# Patient Record
Sex: FEMALE | Race: WHITE
[De-identification: names, ages, dates, MRNs, and addresses within clinical notes are randomized per-mention and may not be internally consistent; named-entity substitution may affect disease eponyms.]

---

## 2017-02-21 NOTE — CT
EXAMINATION TYPE: CT brain wo con

 

DATE OF EXAM: 2/21/2017 12:25 PM

 

COMPARISON: NONE

 

HISTORY: 76-year-old female with pain and complains of episode of slurred speech earlier today.

 

TECHNIQUE:  

Examination was done in axial plane without intravenous contrast.  Coronal and sagittal reconstructio
ns performed.

 

CT DLP: 578.9 mGycm

Automated exposure control for dose reduction was used.

 

FINDINGS:

There is no evidence of  acute intracranial hemorrhage, acute ischemic changes, mass, mass-effect, or
 extra-axial fluid collection.  There is no effacement of cerebral sulci or basal subarachnoid cister
ns.  There is no hydrocephalus.  There is no midline shift.  Gray-white matter distinction is preserv
ed.

 

There is mild to moderate generalized supratentorial volume loss with moderate confluent white matter
 hypodensities in both cerebral hemispheres.

 

Paranasal sinuses and mastoid air cells are well pneumatized. Orbits and globes are intact.

 

 

IMPRESSION:

No acute intracranial abnormality seen. Mild to moderate atrophy and moderate confluent changes of ch
ronic small vessel ischemic disease.

## 2017-02-21 NOTE — CT
EXAMINATION TYPE: CT abdomen wo con

 

DATE OF EXAM: 2/21/2017 5:42 PM

 

COMPARISON: 10/11/2013

 

HISTORY: Patient complains of epigastric pain.

 

CT DLP: 666 mGycm

Automated exposure control for dose reduction was used.

 

TECHNIQUE:  Helical acquisition of images was performed from the lung bases through the top of iliac 
crest to include entire abdomen.

 

CONTRAST: 

Performed with Oral Contrast and without IV contrast.

 

FINDINGS: 

 

The heart is enlarged. There is no pleural effusion. There is no pericardial effusion. Abdominal aort
a is atheromatous. There are clips from cholecystectomy. Liver shows no focal defect. Spleen appears 
normal. There is no evidence of a pancreatic mass. There is no adrenal mass. Kidneys have normal size
 and contour. There is no hydronephrosis. There is no retroperitoneal adenopathy. I see no intestinal
 wall thickening. There is no sign of a bowel obstruction. There is no ascites. There is spurring in 
the lumbar spine.

IMPRESSION: 

CARDIOMEGALY. ATHEROSCLEROTIC VASCULAR DISEASE. SPONDYLOTIC CHANGES IN THE LUMBAR SPINE. NO SIGN OF A
CUTE ABDOMEN AND PELVIS. MINIMAL SCARRING OR SUBSEGMENTAL ATELECTASIS AT THE LUNG BASES. THERE IS PRO
BABLY A SMALL HIATAL HERNIA.

 

There is no adverse change compared to old CT scan.

## 2017-02-21 NOTE — ED
General Adult HPI





- General


Stated complaint: Upper Abd pain


Time Seen by Provider: 02/21/17 11:52


Source: patient, RN notes reviewed


Mode of arrival: EMS


Limitations: no limitations





- History of Present Illness


Initial comments: 





76-year-old female presents to the emergency department with a chief complaint 

of epigastric discomfort.  Patient developed this epigastric discomfort this 

morning.  Patient states it hurts she touches the upper part of her belly.  

Patient states that she took her and asked that medicine because she was on the 

past that this was her acid reflux but it did not help.  Patient states that 

she then developed some left arm heaviness.  Patient states during this time 

she did have an episode of slurred speech but that has since resolved.  Patient 

states that she hasn't had a fever or chills.  Patient states when she is 

having epigastric pain she has some nausea there has not been any sweating.  

Patient states that she was concerned due to the pain that the fact that her 

typical medications and help in this new left arm heaviness so she thought that 

she should be evaluated.  Patient denies any back pain with this.  Patient 

states she hasn't had a fever chills she denies any diarrhea.  Patient denies 

any actual vomiting.Patient denies any recent fever, chills, shortness of breath

, chest pain, back pain,  vomiting, numbness or tingling, dysuria or hematuria, 

constipation or diarrhea, headaches or visual changes, or any other current 

symptoms.





- Related Data


 Home Medications











 Medication  Instructions  Recorded  Confirmed


 


Enalapril [Vasotec] 20 mg PO BID 05/30/14 02/21/17


 


Simvastatin [Zocor] 40 mg PO HS 05/30/14 02/21/17


 


glyBURIDE/METFORMIN HCL 2 tab PO BID 05/30/14 02/21/17





[Glucovance 5-500 mg Tablet]   


 


traMADol HCl [Ultram] 50 mg PO TID PRN 05/30/14 02/21/17


 


Cholecalciferol [Vitamin D3] 4,000 unit PO SA 02/21/17 02/21/17


 


Gabapentin [Neurontin] 100 mg PO HS 02/21/17 02/21/17


 


Insulin Glargine [Lantus] 42 unit SQ HS 02/21/17 02/21/17


 


Isosorbide Mononitrate ER [Imdur] 30 mg PO DAILY 02/21/17 02/21/17


 


LORazepam [Ativan] 0.5 mg PO DAILY PRN 02/21/17 02/21/17


 


Nitrofurantoin Macrocrystal 50 mg PO HS 02/21/17 02/21/17





[Macrodantin]   


 


Pantoprazole [Protonix] 40 mg PO DAILY 02/21/17 02/21/17


 


amLODIPine [Norvasc] 5 mg PO DAILY 02/21/17 02/21/17











 Allergies











Allergy/AdvReac Type Severity Reaction Status Date / Time


 


latex Allergy  Rash/Hives Verified 02/21/17 12:08














Review of Systems


ROS Statement: 


Those systems with pertinent positive or pertinent negative responses have been 

documented in the HPI.





ROS Other: All systems not noted in ROS Statement are negative.





Past Medical History


Past Medical History: Diabetes Mellitus, Hypertension


Additional Past Medical History / Comment(s): high cholesterol


History of Any Multi-Drug Resistant Organisms: None Reported


Past Surgical History: Cholecystectomy, Tonsillectomy, Tubal Ligation


Past Psychological History: Anxiety, Depression


Smoking Status: Former smoker


Past Alcohol Use History: None Reported


Past Drug Use History: None Reported





General Exam





- General Exam Comments


Initial Comments: 





General:  The patient is awake and alert, in no distress, and does not appear 

acutely ill. 


Eye:  Pupils are equal, round and reactive to light, extra-ocular movements are 

intact; there is normal conjunctiva bilaterally.  No signs of icterus.  


Ears, nose, mouth and throat:  There are moist mucous membranes and no oral 

lesions. 


Neck:  The neck is supple, there is no tenderness.


Cardiovascular:  There is a regular rate and rhythm. No murmur, rub or gallop 

is appreciated.


Respiratory:  Lungs are clear to auscultation, respirations are non-labored, 

breath sounds are equal.  No wheezes, stridor, rales, or rhonchi.


Gastrointestinal:  Soft, non-distended,  epigastric tender abdomen without 

masses or organomegaly noted. There is no rebound or guarding present.  No CVA 

tenderness. Bowel sounds are unremarkable.


Back:  There is no tenderness to palpation in the midline. There is no obvious 

deformity. No rashes noted. 


Musculoskeletal:  Normal ROM, no tenderness, There is no pedal edema. There is 

no calf tenderness or swelling. Sensation intact. Pulses equal bilaterally 2+.  


Neurological:  CN II-XII intact, There are no obvious motor or sensory 

deficits. Coordination appears grossly intact. Speech is normal.


Skin:  Skin is warm and dry and no rashes or lesions are noted. 


Psychiatric:  Cooperative, appropriate mood & affect, normal judgment.  





Limitations: no limitations





Course


 Vital Signs











  02/21/17 02/21/17 02/21/17





  11:52 12:05 13:02


 


Temperature 97.3 F L 98.8 F 99.3 F


 


Pulse Rate 67 68 76


 


Respiratory 18 24 20





Rate   


 


Blood Pressure 157/91 176/77 168/81


 


O2 Sat by Pulse 99 98 98





Oximetry   














EKG Findings





- EKG Comments:


EKG Findings:: Normal sinus rhythm, T-wave inversion in V3 V4 V5 and V6, 

ventricular rate 64, NJ interval 154





Medical Decision Making





- Medical Decision Making





76-year-old female presents to the emergency department with a chief complaint 

of epigastric abdominal pain.  As the patient does appear to have acute 

pancreatitis along with a mild acute kidney injury.  Similar patient for IV 

hydration.  At this time this is discussed with patient who is in agreement the 

plan.  All questions have been answered.  They will admit the patient.





- Lab Data


Result diagrams: 


 02/21/17 11:52





 02/21/17 11:52


 Lab Results











  02/21/17 02/21/17 02/21/17 Range/Units





  11:52 11:52 11:52 


 


WBC    9.9  (3.8-10.6)  k/uL


 


RBC    5.73 H  (3.80-5.40)  m/uL


 


Hgb    11.9  (11.4-16.0)  gm/dL


 


Hct    38.5  (34.0-46.0)  %


 


MCV    67.2 L  (80.0-100.0)  fL


 


MCH    20.8 L  (25.0-35.0)  pg


 


MCHC    30.9 L  (31.0-37.0)  g/dL


 


RDW    14.8  (11.5-15.5)  %


 


Plt Count    223  (150-450)  k/uL


 


Neutrophils %    60  %


 


Lymphocytes %    30  %


 


Monocytes %    4  %


 


Eosinophils %    3  %


 


Basophils %    1  %


 


Neutrophils #    5.9  (1.3-7.7)  k/uL


 


Lymphocytes #    2.9  (1.0-4.8)  k/uL


 


Monocytes #    0.4  (0-1.0)  k/uL


 


Eosinophils #    0.3  (0-0.7)  k/uL


 


Basophils #    0.1  (0-0.2)  k/uL


 


Hypochromasia    Slight  


 


Microcytosis    Marked  


 


PT     (9.0-12.0)  sec


 


INR     (<1.1)  


 


APTT     (22.0-30.0)  sec


 


Sodium  136 L    (137-145)  mmol/L


 


Potassium  5.1    (3.5-5.1)  mmol/L


 


Chloride  101    ()  mmol/L


 


Carbon Dioxide  19 L    (22-30)  mmol/L


 


Anion Gap  16    mmol/L


 


BUN  20 H    (7-17)  mg/dL


 


Creatinine  1.24 H    (0.52-1.04)  mg/dL


 


Est GFR (MDRD) Af Amer  51    (>60 ml/min/1.73 sqM)  


 


Est GFR (MDRD) Non-Af  42    (>60 ml/min/1.73 sqM)  


 


Glucose  228 H    (74-99)  mg/dL


 


Calcium  10.0    (8.4-10.2)  mg/dL


 


Magnesium  1.3 L    (1.6-2.3)  mg/dL


 


Total Bilirubin  0.7    (0.2-1.3)  mg/dL


 


AST  32    (14-36)  U/L


 


ALT  31    (9-52)  U/L


 


Alkaline Phosphatase  102    ()  U/L


 


Total Creatine Kinase   73   ()  U/L


 


CK-MB (CK-2)   1.3   (0.0-2.4)  ng/mL


 


CK-MB (CK-2) Rel Index   1.8   


 


Troponin I   0.014   (0.000-0.034)  ng/mL


 


Total Protein  7.8    (6.3-8.2)  g/dL


 


Albumin  4.2    (3.5-5.0)  g/dL


 


Amylase  222 H    ()  U/L


 


Lipase  2767 H    ()  U/L


 


Urine Color     


 


Urine Appearance     (Clear)  


 


Urine pH     (5.0-8.0)  


 


Ur Specific Gravity     (1.001-1.035)  


 


Urine Protein     (Negative)  


 


Urine Glucose (UA)     (Negative)  


 


Urine Ketones     (Negative)  


 


Urine Blood     (Negative)  


 


Urine Nitrate     (Negative)  


 


Urine Bilirubin     (Negative)  


 


Urine Urobilinogen     (<2.0)  mg/dL


 


Ur Leukocyte Esterase     (Negative)  














  02/21/17 02/21/17 Range/Units





  11:52 12:40 


 


WBC    (3.8-10.6)  k/uL


 


RBC    (3.80-5.40)  m/uL


 


Hgb    (11.4-16.0)  gm/dL


 


Hct    (34.0-46.0)  %


 


MCV    (80.0-100.0)  fL


 


MCH    (25.0-35.0)  pg


 


MCHC    (31.0-37.0)  g/dL


 


RDW    (11.5-15.5)  %


 


Plt Count    (150-450)  k/uL


 


Neutrophils %    %


 


Lymphocytes %    %


 


Monocytes %    %


 


Eosinophils %    %


 


Basophils %    %


 


Neutrophils #    (1.3-7.7)  k/uL


 


Lymphocytes #    (1.0-4.8)  k/uL


 


Monocytes #    (0-1.0)  k/uL


 


Eosinophils #    (0-0.7)  k/uL


 


Basophils #    (0-0.2)  k/uL


 


Hypochromasia    


 


Microcytosis    


 


PT  11.8   (9.0-12.0)  sec


 


INR  1.2   (<1.1)  


 


APTT  21.9 L   (22.0-30.0)  sec


 


Sodium    (137-145)  mmol/L


 


Potassium    (3.5-5.1)  mmol/L


 


Chloride    ()  mmol/L


 


Carbon Dioxide    (22-30)  mmol/L


 


Anion Gap    mmol/L


 


BUN    (7-17)  mg/dL


 


Creatinine    (0.52-1.04)  mg/dL


 


Est GFR (MDRD) Af Amer    (>60 ml/min/1.73 sqM)  


 


Est GFR (MDRD) Non-Af    (>60 ml/min/1.73 sqM)  


 


Glucose    (74-99)  mg/dL


 


Calcium    (8.4-10.2)  mg/dL


 


Magnesium    (1.6-2.3)  mg/dL


 


Total Bilirubin    (0.2-1.3)  mg/dL


 


AST    (14-36)  U/L


 


ALT    (9-52)  U/L


 


Alkaline Phosphatase    ()  U/L


 


Total Creatine Kinase    ()  U/L


 


CK-MB (CK-2)    (0.0-2.4)  ng/mL


 


CK-MB (CK-2) Rel Index    


 


Troponin I    (0.000-0.034)  ng/mL


 


Total Protein    (6.3-8.2)  g/dL


 


Albumin    (3.5-5.0)  g/dL


 


Amylase    ()  U/L


 


Lipase    ()  U/L


 


Urine Color   Yellow  


 


Urine Appearance   Clear  (Clear)  


 


Urine pH   6.0  (5.0-8.0)  


 


Ur Specific Gravity   1.012  (1.001-1.035)  


 


Urine Protein   Trace H  (Negative)  


 


Urine Glucose (UA)   Trace H  (Negative)  


 


Urine Ketones   Negative  (Negative)  


 


Urine Blood   Negative  (Negative)  


 


Urine Nitrate   Negative  (Negative)  


 


Urine Bilirubin   Negative  (Negative)  


 


Urine Urobilinogen   <2.0  (<2.0)  mg/dL


 


Ur Leukocyte Esterase   Negative  (Negative)  














- Radiology Data


Radiology results: report reviewed, image reviewed





Disposition


Clinical Impression: 


 Hyponatremia, KATHERINE (acute kidney injury), Acute pancreatitis





Disposition: ADMITTED AS IP TO THIS [facility]


Condition: Stable


Referrals: 


Mckay Martinez MD [Primary Care Provider] - 1-2 days


Time of Disposition: 13:13


Decision Date: 02/21/17


Decision Time: 13:13

## 2017-02-21 NOTE — XR
EXAMINATION TYPE: XR chest 2V

 

DATE OF EXAM: 2/21/2017 12:32 PM

 

COMPARISON: 2/14/2013

 

HISTORY: 76-year-old female with chest pain and chest pressure

 

TECHNIQUE:  Frontal and lateral views

 

FINDINGS:  

Heart is upper limits of normal in size with mild elongation of the thoracic aorta. Mild interstitial
 prominence and some stable scarring at the left base. No pleural effusion.

 

 

IMPRESSION:  

 

Borderline heart size and mild interstitial prominence; correlate to exclude mild CHF. Otherwise, no 
acute process seen.

## 2017-02-21 NOTE — HP
DATE OF ADMISSION:  



CHIEF COMPLAINT: Abdominal pain.



HISTORY OF PRESENT ILLNESS: This 76-year-old woman with a past history 

of diabetes, GERD, hypertension, hyperlipidemia, history of 

vasospastic angina, history of sinus problems, history of 

cholecystectomy, tonsillectomy, anxiety, depression, being followed by 

Dr. Mckay Martinez in the outpatient setting, was complaining of 

epigastric pain since this morning. The abdomen is painful to touch, 

according to her. The patient had some nausea, too. Patient thought 

that it was acid reflux, and she came to [facility] and was 

admitted for further evaluation and treatment. Some left arm heaviness 

also was noted. There is no history of any fever, rigor, or chills.  

No history of any headache, loss of consciousness, seizures. The 

patient came to [facility] and was found to have amylase of 

222 and lipase 276, indicative of acute pancreatitis. The patient had 

a cholecystectomy previously.  



PAST MEDICAL HISTORY: 

1. History of diabetes mellitus.

2. History of GERD. 

3. Hypertension.

4. Hyperlipidemia. 

5. History of cholecystectomy. 

6. History of tonsillectomy. 

7. Atrial fibrillation. 

8. Anxiety. 

9. Depression. 



Home medications are: 

1. Macrodantin 50 mg p.o. at bedtime. 

2. Vitamin D3 4000 p.o. Saturday. 

3. Protonix 40 mg daily. 

4. Imdur 30 mg p.o. daily. 

5. Ultram 50 mg t.i.d. p.r.n. 

6. Norvasc 5 mg p.o. daily. 

7. Glucovance 5/500 two tablets p.o. b.i.d. 

8. Vasotec 20 mg p.o. b.i.d. 

9. Zocor 40 mg at bedtime. 

10. Neurontin 300 mg p.o. at bedtime. 

11. Ativan 0.5 mg daily p.r.n. 

12. Lantus 42 units subcutaneously at bedtime. 



ALLERGIES: LATEX. 



FAMILY HISTORY: History of cancer in the family. 



SOCIAL HISTORY: Previous history of smoking. No current smoking or 

alcohol intake.  



REVIEW OF SYSTEMS: 

ENT: No diminished hearing. No diminished vision. 

CARDIOVASCULAR: No angina, palpitations. 

RESPIRATORY: No cough, hemoptysis. 

GI: As mentioned earlier. 

: No dysuria. 

NERVOUS SYSTEM: No numbness or weakness. 

ALLERGY/IMMUNOLOGY: No asthma, hayfever. 

MUSCULOSKELETAL: As mentioned earlier. 

HEMATOLOGY/ONCOLOGY: No history of anemia. 

ENDOCRINE: Diabetes mellitus. 

CONSTITUTIONAL:  As mentioned earlier. 

DERMATOLOGY: Negative. 

RHEUMATOLOGY: Negative. 

PSYCHIATRY: As mentioned earlier. 



PHYSICAL EXAMINATION: Patient is alert and oriented x3. Pulse 68, 

blood pressure 150/84, respiration 20, temperature 97.8, pulse ox 98% 

on 3 L.  

HEENT: Conjunctivae normal. Oral mucosa moist. 

NECK: No jugular venous distention. No carotid bruit. No lymph node 

enlargement. 

CARDIOVASCULAR SYSTEM: S1, S2 normal. No S3. No S4. 

RESPIRATORY: Breath sounds diminished at the bases. No rhonchi. No 

crackles.  

ABDOMEN: Soft, obese. Mild diffuse tenderness in the epigastrium. No 

guarding. No rigidity. No mass palpable. No hepatosplenomegaly. No 

ascites. Bowel sounds present.  

LEGS: No edema. No swelling. 

NERVOUS SYSTEM: Higher functions as mentioned earlier. Moves all 4 

limbs. No focal motor or sensory deficit. Cranial nerves 2-12 grossly 

intact  

LYMPHATICS:  No lymph node palpable in neck, axillae or groin.   

SKIN: No ulcer, rash, bleeding. 



LABS: WBC 9.3, hemoglobin 11.9. INR 1.2. Sodium 136. Creatinine is 

1.24. Glucose is 228. Magnesium is 1.3. Amylase and lipase noted.  



ASSESSMENT: 

1. Abdominal pain with acute pancreatitis. 

2. History of cholecystectomy. 

3. Increased creatinine with possible mild acute renal failure, 

prerenal, secondary to dehydration.  

4. Diabetes mellitus, type 2. 

5. Hypomagnesemia. 

6. Hyponatremia. 

7. Obesity; body mass index 34.1. 

8. Microcytosis. 

9. Gastroesophageal reflux disease. 

10. Hypertension. 

11. Hyperlipidemia. 

12. History of vasospastic angina. 

13. History of degenerative joint disease. 

14. History of urinary tract infections. 

15. History of anxiety, depression not otherwise specified. 

16. Remote history of nicotine dependence. 

17. FULL CODE.



RECOMMENDATIONS AND DISCUSSION: In this 76-year-old woman who 

presented with multiple complex medical issues, we will monitor the 

patient closely, continue the current medications, continue with 

symptomatic treatment. I would recommend IV fluids. Will hold the oral 

antihyper medications at this time. Otherwise, continue to 

monitor. Accu-Cheks before meals and at bedtime and scale. Resume the 

home medications. Medication reconciliation done. Pain medications. 

Guarded prognosis because of multiple complex medical issues. Further 

recommendations to follow. A copy of this dictation is being forwarded 

to Dr. Martinez, who is the primary physician.  



ELPIDIO

## 2017-02-22 NOTE — PN
DATE OF SERVICE: 02/22/2017



This 76-year-old woman who was admitted with abdominal pain with acute 

pancreatitis also had a history of cholecystectomy previously. No 

chest pain or palpitations. No fever.  amylase and lipase. 

Still has some abdominal pain. The CT scan of the abdomen showed 

atherosclerotic changes but no significant abnormalities of the 

pancreas. Hiatal hernia was also noted. On exam, alert and oriented 

x3. Pulse 70, blood pressure 135/77, respiration 20, temperature 98.7, 

pulse ox 96% on room air.  

HEENT: Conjunctivae normal. 

NECK: No jugular venous distention. 

CARDIOVASCULAR SYSTEM: S1, S2 muffled. 

RESPIRATORY SYSTEM: Breath sounds diminished at the bases. A few 

scattered rhonchi. No crackles. 

ABDOMEN: Soft. Mild diffuse discomfort. No guarding. No rigidity. No 

mass palpable.  

LEGS: No edema. No swelling. 

NERVOUS SYSTEM: Higher functions as mentioned earlier. Moves all 4 

limbs. No focal motor or sensory deficit.  

LYMPHATICS:  No lymph node palpable in neck, axillae or groin.   

SKIN: No ulcer, rash, bleeding. 



LABS AT THIS TIME: Hemoglobin is 11.2. Otherwise, glucose is 182. 

Creatinine 1.14. Amylase 139. Lipase is 1026.  



ASSESSMENT: 

1. Abdominal pain with acute pancreatitis. 

2. History of cholecystectomy. 

3. Increased creatinine with possible mild acute renal failure, 

prerenal secondary to dehydration. 

4. Diabetes mellitus, type 2. 

5. Hypomagnesemia. 

6. Hyponatremia. 

7. Obesity with a body mass index of 34.1. 

8. Microcytosis. 

9. History of gastroesophageal reflux disease. 

10. Hypertension, essential. 

11. Hyperlipidemia. 

12. History of vasospastic angina. 

13. History of degenerative joint disease. 

14. History of urinary tract infection. 

15. History of anxiety and depression not otherwise specified. 

16. Remote history of nicotine dependence. 

17. FULL CODE.



RECOMMENDATIONS AND DISCUSSION: In this 76-year-old woman who 

presented with multiple complex medical issues, we will monitor the 

patient closely, continue the current medication, continue symptomatic 

treatment.  I will initiate the diet. Closely monitor. Will also 

obtain a gastroenterology consultation. Otherwise, guarded prognosis 

because of multiple complex medical issues. Further recommendations to 

follow. See orders for further details.  



MTDD

## 2017-02-23 NOTE — P.CONS
History of Present Illness





- Reason for Consult


Consult date: 17


Pancreatitis


Requesting physician: Oralia Castro





- History of Present Illness


76-year-old female patient Dr. Martinez with a past medical history of acalculus 

cholecystectomy, chronic kidney disease, diabetes mellitus, GERD, hypertension, 

hyperlipidemia, anxiety, depression.  Patient presents with severe 

midepigastric abdominal pain elevated BUN/creatinine.  Abdominal pain started 

Monday with elevated admission pancreatic enzymes consistent with acute 

pancreatitis.  Lipase 1026.  Amylase 139.  LFTs within normal limits.  

Triglycerides 199.  Calcium 9.2.





No history of pancreatitis.  No history of fever, chills, hematemesis, 

hematochezia, melena, peptic ulcer disease, alcoholism, or known pancreatic 

disorders.  She was in [place] 6 months ago and was evaluated for midepigastric 

pain at that time she was taking NSAIDs for arthritic discomfort.  She was 

prescribed a PPI therapy without EGD.  She discontinued NSAIDs 3 months ago per 

recommendation of her nephrologist for concern of worsening kidney function.  

No history of EGD.





Presently she feels better.  Lipase improved 425.  Amylase 88.  Hemoglobin 11.9 

admission currently 10.3.  MCV 68.  Platelet 216.  She is tolerating clear 

liquid diet.





CT abdomen reported no focal liver defect.  Spleen appeared normal with no 

evidence of pancreatic mass.








Review of Systems


Constitutional: Denies fever, chills, sweats, weight gain, or loss.


HEENT: Negative for migraines, blurred vision or loss, earaches, drainage, 

tinnitus, oral mucosal lesions, dysphagia, or odynophagia.


CARDIAC: Hyperlipidemia.  Hypertension.  Negative for chest pain, arrhythmias, 

or palpitation.


RESPIRATORY: Negative for shortness of breath, hemoptysis, cough, or sputum 

production.


GI: See HPI for pertinent findings.


: Negative for hematuria, urgency, frequency, polyuria, or dysuria.


GYNc: Denies possibility of pregnancy.  Negative vaginal discharge.


MUSCULOSKELETAL: Negative for muscle aches, swelling, arthritis, and 

arthralgias.  


NEUROLOGIC: Negative for stroke or TIA.


ENDOCRINE: Diabetes mellitus..


SKIN: Negative for rash or itching.


PSYCHIATRIC: depression and anxiety





All systems: negative (See HPI)





Past Medical History


Past Medical History: Diabetes Mellitus, GERD/Reflux, Hyperlipidemia, 

Hypertension


Additional Past Medical History / Comment(s): IDDM type II, possible 

vasospastic angina, sinus problems, arthritis in back, prone to UTIs.


History of Any Multi-Drug Resistant Organisms: None Reported


Past Surgical History: Cholecystectomy, Tonsillectomy, Tubal Ligation


Additional Past Surgical History / Comment(s): 2010 cardiac cath-normal, 

bilateral cataract removal, colonoscopy.


Past Anesthesia/Blood Transfusion Reactions: No Reported Reaction


Additional Past Anesthesia/Blood Transfusion Reaction / Comm: Pt has 

clausterphobia.


Past Psychological History: Anxiety, Depression


Additional Psychological History / Comment(s): Pt resides alone.  She is 

independent.


Smoking Status: Former smoker


Past Alcohol Use History: Rare


Additional Past Alcohol Use History / Comment(s): Pt states she started smoking 

in  and quit in .


Past Drug Use History: None Reported





- Past Family History


  ** Father


Family Medical History: Cancer


Additional Family Medical History / Comment(s): Father  at the age of 81 

yrs of lung cancer.  He was a smoker.





  ** Mother


Family Medical History: Cancer


Additional Family Medical History / Comment(s): Mother had cervical cancer.  

She  at the age of 53 from her lupus.





Medications and Allergies


 Home Medications











 Medication  Instructions  Recorded  Confirmed  Type


 


Enalapril [Vasotec] 20 mg PO BID 14 History


 


Simvastatin [Zocor] 40 mg PO HS 14 History


 


glyBURIDE/METFORMIN HCL 2 tab PO BID 14 History





[Glucovance 5-500 mg Tablet]    


 


traMADol HCl [Ultram] 50 mg PO TID PRN 14 History


 


Cholecalciferol [Vitamin D3] 4,000 unit PO SA 17 History


 


Gabapentin [Neurontin] 100 mg PO  17 History


 


Insulin Glargine [Lantus] 42 unit SQ HS 17 History


 


Isosorbide Mononitrate ER [Imdur] 30 mg PO DAILY 17 History


 


LORazepam [Ativan] 0.5 mg PO DAILY PRN 17 History


 


Nitrofurantoin Macrocrystal 50 mg PO HS 17 History





[Macrodantin]    


 


Pantoprazole [Protonix] 40 mg PO DAILY 17 History


 


amLODIPine [Norvasc] 5 mg PO DAILY 17 History











 Allergies











Allergy/AdvReac Type Severity Reaction Status Date / Time


 


latex Allergy  Rash/Hives Verified 17 12:08














Physical Exam


Vitals: 


 Vital Signs











  Temp Pulse Resp BP BP Pulse Ox


 


 17 07:00  97.7 F  63  20  152/70   96


 


 17 23:00  98.7 F  65  18   130/63  94 L


 


 17 15:00  98.5 F  72  20   129/62  95








 Intake and Output











 17





 22:59 06:59 14:59


 


Intake Total   480


 


Balance   480


 


Intake:   


 


  Oral   480


 


Other:   


 


  # Voids 2 2 











General appearance: The patient is alert, oriented, in no acute distress.


HET: Head is normocephalic and atraumatic.  Pupils are equal and reactive.  

Oropharynx is clear without lesions.


Neck: Supple without lymphadenopathy.  Trachea midline.


Heart: S1 S2.  Regular rate and rhythm.


Lungs: No crackles or wheezes are heard.


Abdomen: Soft, mild midepigastric tenderness, nondistended with  bowel sounds.  

No peritoneal signs.  No palpable organomegaly or masses.


Extremities: Normal skin color and turgor.  No cyanosis, rash, ulceration, 

clubbing, or edema.  Radial and pedal pulses are 2/4 bilaterally.


Neurological: No focal deficits.  Strength and sensation are grossly intact.








Results


CBC & Chem 7: 


 17 08:49





 17 08:49


Labs: 


 Abnormal Lab Results - Last 24 Hours (Table)











  17 Range/Units





  11:48 17:22 20:10 


 


Hgb     (11.4-16.0)  gm/dL


 


MCV     (80.0-100.0)  fL


 


MCH     (25.0-35.0)  pg


 


MCHC     (31.0-37.0)  g/dL


 


Carbon Dioxide     (22-30)  mmol/L


 


Creatinine     (0.52-1.04)  mg/dL


 


Glucose     (74-99)  mg/dL


 


POC Glucose (mg/dL)  148 H  156 H  314 H  (75-99)  mg/dL


 


AST     (14-36)  U/L


 


Albumin     (3.5-5.0)  g/dL


 


Lipase     ()  U/L














  17 Range/Units





  20:12 07:12 08:49 


 


Hgb    10.3 L  (11.4-16.0)  gm/dL


 


MCV    68.6 L  (80.0-100.0)  fL


 


MCH    20.7 L  (25.0-35.0)  pg


 


MCHC    30.1 L  (31.0-37.0)  g/dL


 


Carbon Dioxide     (22-30)  mmol/L


 


Creatinine     (0.52-1.04)  mg/dL


 


Glucose     (74-99)  mg/dL


 


POC Glucose (mg/dL)  236 H  110 H   (75-99)  mg/dL


 


AST     (14-36)  U/L


 


Albumin     (3.5-5.0)  g/dL


 


Lipase     ()  U/L














  17 Range/Units





  08:49 


 


Hgb   (11.4-16.0)  gm/dL


 


MCV   (80.0-100.0)  fL


 


MCH   (25.0-35.0)  pg


 


MCHC   (31.0-37.0)  g/dL


 


Carbon Dioxide  20 L  (22-30)  mmol/L


 


Creatinine  1.06 H  (0.52-1.04)  mg/dL


 


Glucose  189 H  (74-99)  mg/dL


 


POC Glucose (mg/dL)   (75-99)  mg/dL


 


AST  37 H  (14-36)  U/L


 


Albumin  3.2 L  (3.5-5.0)  g/dL


 


Lipase  425 H  ()  U/L











CT scan - abdomen: report reviewed (Reviewed by Dr. Whatley)





Assessment and Plan


(1) Acute pancreatitis


Narrative/Plan: 


76-year-old female presents with first episode of acute pancreatitis with 

normal liver function test possible autoimmune possible idiopathic.  Peptic 

ulcer disease cannot be entirely excluded with history of epigastric pain for 6 

months duration with remote NSAID usage without improvement using PPI therapy.


Status: Acute   





(2) Diabetes mellitus


Status: Chronic   


Plan: 


1.  Advance diet as tolerated.  Nothing by mouth after midnight.


2.  Will proceed with EGD evaluation tomorrow to rule out peptic ulcer disease 

and evaluation of persistent epigastric pain of 6 months duration.


3.  Continue GI prophylaxis.  No aspirin or NSAIDs.


4.  Will obtain KIARA screen and IgG subclass 4 serology.








The gastroenterologist has discussed the risks, benefits and alternative 

therapies for the above-mentioned procedure and for both sedation/analgesia as 

well as necessary blood product administration, if indicated, as they pertain 

to this patient.  The patient has indicated understanding and acceptance of the 

risks and procedures discussed.








Thank you for this kind referral and the opportunity to participate in the care 

of your patient.  This consultation was discussed with Dr. Wahtley.  The 

impression and plan of care have been directed as dictated.

## 2017-02-23 NOTE — PN
Patient is admitted secondary to pancreatitis and patient has a 

possibility of idiopathic pancreatitis. (    ) testing was by ordered 

by Gastroenterology. Patient is going for upper GI endoscopy. Patient 

has hiatal hernia and acid reflux symptoms at this point of time. Will 

advance the diet today and patient will go for upper GI endoscopy 

tomorrow. Possibility of discharge tomorrow if she is able to tolerate 

diet well. Lipase has come down as symptoms of pancreatitis did 

improve significantly.  



REVIEW OF SYSTEMS: 

GASTROINTESTINAL: As described in HPI.

CARDIOVASCULAR:  No chest pain, no orthopnea, no PND, no palpitations.  

PULMONARY:  Denied any shortness of breath.  No cough or hemoptysis. 

NEUROLOGIC:  No headaches, no weakness, no numbness. 



Medications were reviewed.



On physical exam, vital signs, temperature 97.7, pulse of 63, 

respiratory rate of 20, blood pressure 152/70. Saturating at 96% on 

room air.  

ABDOMINAL EXAMINATION:   Very minimal epigastric abdominal tenderness 

was appreciated.  

GENERAL: The patient is alert and oriented x3, not in any acute 

distress. Well developed, well nourished.  

HEENT: Pupils are round and equally reacting to light. EOMI. No 

scleral icterus. No conjunctival pallor. Normocephalic, atraumatic. No 

pharyngeal erythema. No thyromegaly.  

CARDIOVASCULAR: S1 and S2 present. No murmurs, rubs, or gallops. 

PULMONARY: Chest is clear to auscultation, no wheezing or crackles. 

MUSCULOSKELETAL: No joint swelling or deformity. 

EXTREMITIES: No cyanosis, clubbing, or pedal edema. 

NEUROLOGICAL: Gross neurological examination did not reveal any focal 

deficits.  

SKIN: No rashes. 



LABORATORY DATA: CBC, CMP are abnormal for mildly elevated creatinine 

of 1.06 which improved. BUN improved.  



ASSESSMENT AND PLAN: 

1. Acute pancreatitis, idiopathic in nature. 

2. Gastritis with hiatal hernia. 

3. Acute renal failure, prerenal azotemia, which improved.

4. Type 2 diabetes mellitus. 

5. Hypomagnesemia. 

6. Obesity. 

7. Hypertension. 

8. Hyperlipidemia. 

9. Depression. 



PLAN:  Continue to advance the diet, n.p.o. today.  Continue with 

upper GI endoscopy tomorrow.  Continue with IV fluids. Possibility of 

discharge tomorrow.

## 2017-02-24 NOTE — P.PCN
Date of Procedure: 02/24/17


Procedure(s) Performed: 


BRIEF HISTORY: Patient is a 76-year-old, pleasant, white female, admitted to 

the hospital with epigastric pain and acute pancreatitis.  The patient has been 

on NSAIDs and has been having intermittent epigastric pain for the last 6 

months duration and hence possibly peptic ulcer disease is being considered as 

a possible etiology of steatohepatitis and hence she is scheduled for an upper 

endoscopy to evaluate further. 





PROCEDURE PERFORMED: Esophagogastroduodenoscopy with biopsy.





PREOPERATIVE DIAGNOSIS: Acute pancreatitis rule out peptic ulcer disease. 





IV sedation per anesthesia. 





PROCEDURE: After informed consent was obtained, the patient  was brought into 

the endoscopy unit. IV conscious sedation was administered by Anesthesia under 

continuous monitoring. Initially the Olympus GIF-140 video endoscope was 

inserted into the mouth. Esophagus intubated without any difficulty. It was 

gradually advanced into the stomach and duodenum and carefully examined. The 

bulb and the second part of the duodenum appeared normal. The scope at this 

time was withdrawn to the stomach, adequately insufflated with air, and upon 

careful examination, mucosa of the antrum, had scattered erosions and biopsies 

were done from this area.  The body, cardia and the fundus appeared normal. The 

scope was then withdrawn into the esophagus. The GE junction was located at 40 

cm from the incisors.  There was circumferential erythema and one superficial 

erosion at the GE junction consistent with LA grade A reflux esophagitis.  Rest 

of the esophagus appeared normal and the patient tolerated the procedure well. 





IMPRESSION: 


1.  Scattered antral erosions.


2.  LA grade A reflux esophagitis.





RECOMMENDATIONS: The findings of this examination were discussed with the 

patient as well as a family.  She was advised to follow with the biopsy 

results.  She will continue with H2 blockers and follow antireflux measures.

## 2017-02-24 NOTE — DS
DATE OF ADMISSION:   02/21/2017

DATE OF DISCHARGE:   02/24/2017



Patient is admitted secondary to pancreatitis, idiopathic 

pancreatitis. Patient is being discharged today, I am cutting down her 

Enalapril and increasing her amlodipine and patient is also found to 

have severe esophagitis on upper GI endoscopy. Patient is clinically 

doing well. Will be discharged today.  Will be discharged in stable 

medical condition to home.  



Patient will follow with Dr. Villegas on March 9, at 12:00 p.m.; Dr. Mckay Martinez on 28th of February and patient will continue her 

diabetic, cardiac and low fat diet. Activity as tolerated. Spent 

greater than 35 minutes in total discharge process. 



Patient was seen and examined on the day of discharge. Vital signs 

stable.  

PHYSICAL EXAMINATION: 

GENERAL: The patient is alert and oriented x3, not in any acute 

distress. Well developed, well nourished.  

HEENT: Pupils are round and equally reacting to light. EOMI. No 

scleral icterus. No conjunctival pallor. Normocephalic, atraumatic. No 

pharyngeal erythema. No thyromegaly.  

CARDIOVASCULAR: S1 and S2 present. No murmurs, rubs, or gallops. 

PULMONARY: Chest is clear to auscultation, no wheezing or crackles. 

ABDOMEN: Soft, nontender, nondistended, normoactive bowel sounds. No 

palpable organomegaly.  

MUSCULOSKELETAL: No joint swelling or deformity. 

EXTREMITIES: No cyanosis, clubbing, or pedal edema. 

NEUROLOGICAL: Gross neurological examination did not reveal any focal 

deficits.  

SKIN: No rashes. 



FINAL DIAGNOSIS(ES): 

1. Acute pancreatitis idiopathic in nature. 

2. Moderate to severe esophagitis, gastritis and hiatal hernia. 

3. Acute renal failure, prerenal azotemia, which improved. 

4. Type 2 diabetes mellitus. 

5. Obesity. 

6. Hypertension. 

7. Hyperlipidemia. 

8. Depression. 



Please refer to my depart summary for further list of discharge 

medications.

## 2017-03-13 NOTE — XR
EXAMINATION TYPE: XR chest 1V portable

 

DATE OF EXAM: 3/13/2017 5:25 PM

 

CLINICAL HISTORY: Pneumonia

 

TECHNIQUE: Single AP portable frontal upright view of the chest is obtained.

 

COMPARISON: Prior chest x-ray February 21, 2017

 

FINDINGS:  The cardiac silhouette size is more prominent and enlarged with new central vascular conge
stion. No large pleural effusion or pneumothorax is seen. The osseous structures are intact.

 

IMPRESSION: Suspect CHF exacerbation as there is cardiomegaly with new mild to moderate central vascu
lar congestion felt present. Clinical correlation advised.

## 2017-03-13 NOTE — ED
General Adult HPI





<EstebanLuis Daniel davies - Last Filed: 17 10:55>





- General


Source: patient, RN notes reviewed


Mode of arrival: ambulatory


Limitations: no limitations





<Micah Rapp - Last Filed: 17 10:56>





- General


Chief complaint: Abdominal Pain


Stated complaint: pancreatitis


Time Seen by Provider: 17 08:49





- History of Present Illness


Initial comments: 





Patient 76-year-old female, with a significant past medical history for 

hypertension, who presents emergency room today with a chief complaint of 

increased abdominal pain that started yesterday.  She does admit to pain 

located in epigastric area.  Currently rates it a 9/10.  Denies any radiation.  

Does admit to some back ache yesterday.  States his symptoms are consistent 

with pancreatitis that she was diagnosed with a few weeks ago.  Patient denies 

any other complaints associated symptoms.  Patient admits to history of 

cholecystectomy.  States she has not been drinker. Patient denies any recent 

fever, chills, shortness of breath, chest pain, nausea or vomiting, numbness or 

tingling, dysuria or hematuria, constipation or diarrhea, headaches or visual 

changes, or any other complaints. (Micah Rapp)





- Related Data


 Home Medications











 Medication  Instructions  Recorded  Confirmed


 


Simvastatin [Zocor] 40 mg PO HS 14


 


glyBURIDE/METFORMIN HCL 2 tab PO BID 14





[Glucovance 5-500 mg Tablet]   


 


traMADol HCl [Ultram] 50 mg PO BID PRN 14


 


Cholecalciferol [Vitamin D3] 5,000 unit PO SA 17


 


Gabapentin [Neurontin] 100 mg PO HS 17


 


Insulin Glargine [Lantus] 42 unit SQ HS 17


 


Isosorbide Mononitrate ER [Imdur] 30 mg PO DAILY 17


 


LORazepam [Ativan] 0.5 mg PO HS PRN 17


 


Nitrofurantoin Macrocrystal 50 mg PO HS 17





[Macrodantin]   


 


Pantoprazole [Protonix] 40 mg PO DAILY 17


 


Enalapril [Vasotec] 20 mg PO BID 17


 


Insulin Aspart [NovoLOG] 5 unit SQ W/BRKFST 17


 


Insulin Aspart [NovoLOG] 7 unit SQ W/SUPPER 17


 


Timolol [Betimol 0.5% Ophth Soln] 1 drop BOTH EYES DAILY 17


 


Triamterene-Hctz 37.5-25Mg 1 cap PO DAILY 17





[Dyazide 37.5-25 Capsule]   


 


amLODIPine [Norvasc] 5 mg PO DAILY 17











 Allergies











Allergy/AdvReac Type Severity Reaction Status Date / Time


 


latex Allergy  Rash/Hives Verified 17 09:03














Review of Systems


ROS Other: All systems not noted in ROS Statement are negative.





<Luis Daniel Robbins - Last Filed: 17 10:55>


ROS Other: All systems not noted in ROS Statement are negative.





<Micah Rapp - Last Filed: 17 10:56>


ROS Statement: 


Those systems with pertinent positive or pertinent negative responses have been 

documented in the HPI.








Past Medical History


Past Medical History: Diabetes Mellitus, GERD/Reflux, Hyperlipidemia, 

Hypertension, Osteoarthritis (OA)


Additional Past Medical History / Comment(s): IDDM type II, possible 

vasospastic angina, sinus problems, arthritis in back, prone to UTIs.


History of Any Multi-Drug Resistant Organisms: None Reported


Past Surgical History: Cholecystectomy, Tonsillectomy, Tubal Ligation


Additional Past Surgical History / Comment(s): 2010 cardiac cath-normal, 

bilateral cataract removal, colonoscopy.


Past Anesthesia/Blood Transfusion Reactions: No Reported Reaction


Additional Past Anesthesia/Blood Transfusion Reaction / Comment(s): Pt has 

clausterphobia.


Past Psychological History: Anxiety, Depression


Additional Psychological History / Comment(s): Pt resides alone.  She is 

independent.


Smoking Status: Former smoker


Past Alcohol Use History: Rare


Additional Past Alcohol Use History / Comment(s): Pt states she started smoking 

in 1956 and quit in .


Past Drug Use History: None Reported





- Past Family History


  ** Father


Family Medical History: Cancer


Additional Family Medical History / Comment(s): Father  at the age of 81 

yrs of lung cancer.  He was a smoker.





  ** Mother


Family Medical History: Cancer


Additional Family Medical History / Comment(s): Mother had cervical cancer.  

She  at the age of 53 from her lupus.





<Micah Rapp - Last Filed: 17 10:56>





General Exam





<Luis Daniel Robbins - Last Filed: 17 10:55>


Limitations: no limitations





<Micah Rapp - Last Filed: 17 10:56>





- General Exam Comments


Initial Comments: 





General:  The patient is awake and alert, in no distress, and does not appear 

acutely ill. 


Eye:  Pupils are equal, round and reactive to light, extra-ocular movements are 

intact.  No nystagmus.  There is normal conjunctiva bilaterally.  No signs of 

icterus.  


Ears, nose, mouth and throat:  There are moist mucous membranes and no oral 

lesions. 


Neck:  The neck is supple, there is no tenderness or JVD.  


Cardiovascular:  There is a regular rate and rhythm. No murmur, rub or gallop 

is appreciated.


Respiratory:  Lungs are clear to auscultation, respirations are non-labored, 

breath sounds are equal.  No wheezes, stridor, rales, or rhonchi.


Gastrointestinal:  Normal appearance of the abdomen.  Normal bowel sounds.  

Abdomen soft on palpation.  Patient does have mild tenderness in both the right 

and left upper quadrants.  Mild tenderness epigastric.  No rebound tenderness.  

No guarding.  No CVA tenderness.


Musculoskeletal:  Normal ROM, no tenderness.  Strength 5/5. Sensation intact. 

Pulses equal bilaterally 2+.  


Neurological:  A&O x 3. CN II-XII intact, There are no obvious motor or sensory 

deficits. Coordination appears grossly intact. Speech is normal.


Skin:  Skin is warm and dry and no rashes or lesions are noted. 


Psychiatric:  Cooperative, appropriate mood & affect, normal judgment.   (Micah Rapp)





EKG Findings





- EKG Comments:


EKG Findings:: EKG performed at 0907:  A 12-lead EKG was performed and 

interpreted by me as showing the following: Rate is 72, and rhythm is normal 

sinus. There are normal QRS complexes and normal R-wave progression. ST 

segments have no elevation or depression, and ND segments appear normal.





<Micah Rapp - Last Filed: 17 10:56>





Medical Decision Making





- Lab Data


Result diagrams: 


 17 09:27





 17 09:27





<Luis Daniel Robbins - Last Filed: 17 10:55>





- Lab Data


Result diagrams: 


 17 09:27





 17 09:27





<Micah Rapp - Last Filed: 17 10:56>





- Medical Decision Making





The patient was seen and examined.  All diagnostics were reviewed.  The case is 

discussed with the PA and I agree with findings as documented.  Case is also 

discussed with Dr. Marc and he is agreeable to admission. (Luis Daniel Robbins)





Patient's labs reviewed and does show evidence for elevated lipase.  Patient's 

BUN and creatinine mildly elevated.  Discussed with attending Dr Robbins.  

Results were discussed with the patient.  Patient be admitted as she still 

having increased abdominal pain.  Patient aware the plan states understanding 

and is in agreement. (Micah Rapp)





- Lab Data


 Lab Results











  17 Range/Units





  09:27 09:27 09:27 


 


WBC    11.2 H  (3.8-10.6)  k/uL


 


RBC    5.96 H  (3.80-5.40)  m/uL


 


Hgb    12.0  (11.4-16.0)  gm/dL


 


Hct    41.1  (34.0-46.0)  %


 


MCV    69.0 L  (80.0-100.0)  fL


 


MCH    20.2 L  (25.0-35.0)  pg


 


MCHC    29.2 L  (31.0-37.0)  g/dL


 


RDW    14.6  (11.5-15.5)  %


 


Plt Count    263  (150-450)  k/uL


 


Neutrophils %    57  %


 


Lymphocytes %    31  %


 


Monocytes %    5  %


 


Eosinophils %    4  %


 


Basophils %    1  %


 


Neutrophils #    6.4  (1.3-7.7)  k/uL


 


Lymphocytes #    3.4  (1.0-4.8)  k/uL


 


Monocytes #    0.6  (0-1.0)  k/uL


 


Eosinophils #    0.4  (0-0.7)  k/uL


 


Basophils #    0.1  (0-0.2)  k/uL


 


Hypochromasia    Marked  


 


Microcytosis    Marked  


 


PT     (9.0-12.0)  sec


 


INR     (<1.1)  


 


APTT     (22.0-30.0)  sec


 


Sodium  140    (137-145)  mmol/L


 


Potassium  4.7    (3.5-5.1)  mmol/L


 


Chloride  106    ()  mmol/L


 


Carbon Dioxide  16 L    (22-30)  mmol/L


 


Anion Gap  18    mmol/L


 


BUN  43 H    (7-17)  mg/dL


 


Creatinine  1.72 H    (0.52-1.04)  mg/dL


 


Est GFR (MDRD) Af Amer  35    (>60 ml/min/1.73 sqM)  


 


Est GFR (MDRD) Non-Af  29    (>60 ml/min/1.73 sqM)  


 


Glucose  199 H    (74-99)  mg/dL


 


Calcium  10.1    (8.4-10.2)  mg/dL


 


Total Bilirubin  0.6    (0.2-1.3)  mg/dL


 


AST  32    (14-36)  U/L


 


ALT  29    (9-52)  U/L


 


Alkaline Phosphatase  106    ()  U/L


 


Total Creatine Kinase   54   ()  U/L


 


CK-MB (CK-2)   2.0   (0.0-2.4)  ng/mL


 


CK-MB (CK-2) Rel Index   3.7   


 


Troponin I   <0.012   (0.000-0.034)  ng/mL


 


Total Protein  8.2    (6.3-8.2)  g/dL


 


Albumin  4.5    (3.5-5.0)  g/dL


 


Amylase  143 H    ()  U/L


 


Lipase  854 H    ()  U/L


 


Urine Color     


 


Urine Appearance     (Clear)  


 


Urine pH     (5.0-8.0)  


 


Ur Specific Gravity     (1.001-1.035)  


 


Urine Protein     (Negative)  


 


Urine Glucose (UA)     (Negative)  


 


Urine Ketones     (Negative)  


 


Urine Blood     (Negative)  


 


Urine Nitrate     (Negative)  


 


Urine Bilirubin     (Negative)  


 


Urine Urobilinogen     (<2.0)  mg/dL


 


Ur Leukocyte Esterase     (Negative)  


 


Urine WBC     (0-5)  /hpf


 


Ur Squamous Epith Cells     (0-4)  /hpf


 


Hyaline Casts     (0-2)  /lpf


 


Urine Mucus     (None)  /hpf














  17 Range/Units





  09:27 09:35 


 


WBC    (3.8-10.6)  k/uL


 


RBC    (3.80-5.40)  m/uL


 


Hgb    (11.4-16.0)  gm/dL


 


Hct    (34.0-46.0)  %


 


MCV    (80.0-100.0)  fL


 


MCH    (25.0-35.0)  pg


 


MCHC    (31.0-37.0)  g/dL


 


RDW    (11.5-15.5)  %


 


Plt Count    (150-450)  k/uL


 


Neutrophils %    %


 


Lymphocytes %    %


 


Monocytes %    %


 


Eosinophils %    %


 


Basophils %    %


 


Neutrophils #    (1.3-7.7)  k/uL


 


Lymphocytes #    (1.0-4.8)  k/uL


 


Monocytes #    (0-1.0)  k/uL


 


Eosinophils #    (0-0.7)  k/uL


 


Basophils #    (0-0.2)  k/uL


 


Hypochromasia    


 


Microcytosis    


 


PT  11.5   (9.0-12.0)  sec


 


INR  1.2   (<1.1)  


 


APTT  25.1   (22.0-30.0)  sec


 


Sodium    (137-145)  mmol/L


 


Potassium    (3.5-5.1)  mmol/L


 


Chloride    ()  mmol/L


 


Carbon Dioxide    (22-30)  mmol/L


 


Anion Gap    mmol/L


 


BUN    (7-17)  mg/dL


 


Creatinine    (0.52-1.04)  mg/dL


 


Est GFR (MDRD) Af Amer    (>60 ml/min/1.73 sqM)  


 


Est GFR (MDRD) Non-Af    (>60 ml/min/1.73 sqM)  


 


Glucose    (74-99)  mg/dL


 


Calcium    (8.4-10.2)  mg/dL


 


Total Bilirubin    (0.2-1.3)  mg/dL


 


AST    (14-36)  U/L


 


ALT    (9-52)  U/L


 


Alkaline Phosphatase    ()  U/L


 


Total Creatine Kinase    ()  U/L


 


CK-MB (CK-2)    (0.0-2.4)  ng/mL


 


CK-MB (CK-2) Rel Index    


 


Troponin I    (0.000-0.034)  ng/mL


 


Total Protein    (6.3-8.2)  g/dL


 


Albumin    (3.5-5.0)  g/dL


 


Amylase    ()  U/L


 


Lipase    ()  U/L


 


Urine Color   Yellow  


 


Urine Appearance   Clear  (Clear)  


 


Urine pH   5.0  (5.0-8.0)  


 


Ur Specific Gravity   1.016  (1.001-1.035)  


 


Urine Protein   1+ H  (Negative)  


 


Urine Glucose (UA)   Negative  (Negative)  


 


Urine Ketones   Negative  (Negative)  


 


Urine Blood   Negative  (Negative)  


 


Urine Nitrate   Negative  (Negative)  


 


Urine Bilirubin   Negative  (Negative)  


 


Urine Urobilinogen   <2.0  (<2.0)  mg/dL


 


Ur Leukocyte Esterase   Negative  (Negative)  


 


Urine WBC   <1  (0-5)  /hpf


 


Ur Squamous Epith Cells   2  (0-4)  /hpf


 


Hyaline Casts   5 H  (0-2)  /lpf


 


Urine Mucus   Rare H  (None)  /hpf














Disposition





<Luis Daniel Robbins - Last Filed: 17 10:55>


Time of Disposition: 10:40





<Micah Rapp - Last Filed: 17 10:56>


Clinical Impression: 


 Acute pancreatitis, Acute renal injury





Disposition: ADMITTED AS IP TO THIS HOSP


Condition: Stable


Referrals: 


Mckay Martinez MD [Primary Care Provider] - 1-2 days

## 2017-03-13 NOTE — XR
EXAMINATION TYPE: XR KUB

 

DATE OF EXAM: 3/13/2017 9:40 AM

 

COMPARISON: NONE

 

HISTORY: Abdominal pain

 

TECHNIQUE: One view abdominal series

 

FINDINGS:  

The osseous structures are intact.  The bowel gas pattern is nonspecific. Scoliotic curvature of the 
spine with degenerative change seen. Postsurgical change right upper quadrant. Arthropathy of the hip
s.

 

IMPRESSION:  

1.  Nonspecific abdomen.

## 2017-03-14 NOTE — PN
DATE OF SERVICE: 03/14/2017



This 76-year-old woman who was admitted with recurrent pancreatitis is 

being closely monitored. Gastroenterology is following the patient 

closely and recommended either ERCP or open MRI because of the 

patient's claustrophobia and MRCP. On exam, alert and oriented x3. 

Pulse is 62, blood pressure 115/55, respiration 18, temperature 97.9, 

pulse ox 94% on room air.  

HEENT: Conjunctivae normal. Oral mucosa moist. 

NECK: No jugular venous distention. No carotid bruit. No lymph node 

enlargement.  

CARDIOVASCULAR: S1, S2 muffled. No S3. No S4. 

RESPIRATORY SYSTEM: Breath sounds diminished at the bases. No rhonchi. 

No crackles.    

ABDOMEN: Soft, nontender. No mass palpable. 

LEGS: No edema. No swelling. 

NERVOUS SYSTEM: Higher functions as mentioned earlier. Moves all 4 

limbs. No focal motor or sensory deficit.  

LYMPHATICS:  No lymph node palpable in neck, axillae or groin.   

SKIN: No ulcer, rash, bleeding. 



Labs at this time show WBC 10.6 and creatinine 1.37. 



ASSESSMENT: 

1. Abdominal pain with acute recurrent pancreatitis

2. Acute on chronic renal failure with chronic kidney disease, stage 

III.  

3. Increased white count. 

4. Microcytosis. 

5. Diabetes mellitus, type 2. 

6. Gastroesophageal reflux disease. 

7. Hypertension. 

8. Hyperlipidemia. 

9. History of degenerative joint disease. 

10. History of significant reflux esophagitis. 

11. History of pancreatitis. 

12. History of hiatal hernia. 

13. History of vasospastic angina history. 

14. History of sinus problems. 

15. History of degenerative joint disease and back pain. 

16. History of recurrent urinary tract infection. 

17. History of cardiac catheterization which was normal in 2010. 

18. History of cataracts. 

19. Anxiety and depression not otherwise specified. 

20. Remote history of nicotine dependence. 

21. Obesity with body mass index of 33.6. 

22. Rule out underlying hepatocellular disease. 

23. FULL CODE. 



RECOMMENDATIONS AND DISCUSSION: In this 76-year-old woman who 

presented with multiple complex medical issues, we will monitor the 

patient closely, continue the current medication, continue with 

symptomatic treatment.  Otherwise, repeat labs. The amylase and lipase 

are improving at this time. Ultrasound abdomen which I reviewed just 

now showed underlying hepatocellular disease. Otherwise, prognosis 

guarded. Further recommendations to follow.

## 2017-03-14 NOTE — HP
DATE OF ADMISSION:  



CHIEF COMPLAINT: Epigastric pain. 



HISTORY OF PRESENT ILLNESS: This 76-year-old woman with a past medical 

history of diabetes mellitus, GERD, hypertension, hyperlipidemia, DJD, 

history of cholecystectomy, cardiac catheterization, anxiety and 

depression who is being followed by Dr. Martinez in the outpatient 

setting. The patient was recently admitted to [facility] with 

features of pancreatitis and abdominal pain, which is thought to be 

idiopathic in nature.  Patient was treated symptomatically. Patient 

improved significantly. CAT scan did not show any acute abnormalities 

at that time. The patient had history of cholecystitis. The patient 

went home. The patient was eating well and going by the dietary 

recommendations, but, however, last night the patient had abdominal 

pain again in the epigastric area and because of severe pain and pain 

was about 9 out of 10 in intensity and the patient came to [facility] and admitted for further evaluation and treatment. The 

amylase and lipase were elevated, found the amylase was 143 and lipase 

was 854.  Recurrence of pancreatitis suspected. There is no history of 

any fever, rigors or chills.  No history of headache, loss of 

consciousness or seizures. Also, creatinine was found to be 1.7, which 

is elevation for the baseline, which indicated acute renal failure as 

well. There is no history of any fever, rigors or chills at this time. 

 



PAST MEDICAL HISTORY: History of recent pancreatitis , history of 

diabetes mellitus, GERD, hypertension, hyperlipidemia, DJD, history of 

cholecystectomy, cardiac catheterization, anxiety, depression.  The 

patient also had severe reflux esophagitis on the EGD done previously. 

 



Medications prior to admission include: 

1. Ultram 50 mg b.i.d. p.r.n. 

2. Glucovance 2 tablets p.o. b.i.d. 

3. Norvasc 5 mg p.o. daily. 

4. Dyazide 37.5/25 mg p.o. daily. 

5. Timolol 0.5 one drop both eyes daily. 

6. Zocor 40 mg q.h.s.

7. Protonix 40 mg p.o. daily. 

8. Macrodantin 50 mg p.o. q.h.s.

9. Ativan 0.5 mg q.h.s. 

10. Imdur ER 30 mg p.o. daily. 

11. Lantus 42 units subcu q.h.s. 

12. NovoLog 7 units subcu with supper, 5 units with breakfast. 

13. Neurontin 100 mg p.o. q.h.s.

14. Vasotec 20 mg p.o. b.i.d. 

15. Vitamin D3, 5000 p.o. Saturday.  



ALLERGIES:  LATEX. 



FAMILY HISTORY: History of cancer in the family, lung cancer, because 

of smoking in father.  



SOCIAL HISTORY: History of occasional alcohol and previous history of 

smoking.  



REVIEW OF SYSTEMS: 

ENT: No diminished hearing or diminished vision. 

CARDIOVASCULAR:  No angina or palpitations. 

RESPIRATORY: No cough. 

GI: As mentioned earlier.

: No dysuria. 

NERVOUS SYSTEM: No numbness or weakness. 

ALLERGY/IMMUNOLOGY: No asthma or hayfever. 

MUSCULOSKELETAL: As mentioned earlier. 

HEMATOLOGY/ONCOLOGY:  No history of anemia. 

ENDOCRINE:  As mentioned earlier, diabetes mellitus. 

CONSTITUTIONAL:  As mentioned earlier.

DERMATOLOGY: Negative. 

RHEUMATOLOGY: Negative. 

PSYCHIATRY:  As mentioned earlier.



PHYSICAL EXAMINATION:  The patient is alert and oriented x3. Pulse 74, 

blood pressure 147/76, respirations 16, temperature 97.5, pulse ox 98% 

on room air.  

HEENT:  Conjunctivae normal. Oral mucosa moist.      

NECK:  No jugular venous distention. No lymph node enlargement. No 

thyroid enlargement or carotid bruit.  

CARDIOVASCULAR: S1 and S2, normal. No S3, no S4. No murmur or rub. 

RESPIRATORY:  Breath sounds diminished at the bases. No rhonchi, no 

crackles. No bronchial breath sounds and no  sounds.  

ABDOMEN:  Soft, obese. Mild diffuse tenderness in the epigastrium up 

to the umbilicus present. No guarding, no rigidity, no mass palpable. 

No ascites. Bowel sounds present.  

LEGS: No edema, no swelling.  

NERVOUS SYSTEM:  Higher function as mentioned earlier. Moves all 4 

limbs.  No focal motor or sensory deficits.  

LYMPHATICS:  No lymphadenopathy of neck, axillae or groin. 

SKIN:  No ulcers, rashes or bleeding. 

JOINTS: No active deforming arthropathy. 

 

LABS: WBC 11.2, hemoglobin 12, MCV 69. Creatinine is 1.72. Glucose 

199.  Amylase and lipase as noted.  



ASSESSMENT: 

1. Abdominal pain with acute recurrent pancreatitis. 

2. Acute renal failure with acute on chronic renal failure with 

chronic kidney disease, stage III.  

3. Increased WBC. 

4. Microcytosis. 

5. Diabetes mellitus type 2. 

6. Gastroesophageal reflux disease. 

7. Hypertension. 

8. Hyperlipidemia. 

9. History of degenerative joint disease. 

10. History of significant reflux esophagitis. 

11. History of pancreatitis. 

12. History of hiatal hernia. 

13. Possible vasospastic angina history. 

14. History of sinus problems. 

15. History of degenerative joint disease and back pain. 

16. History of recurrent urinary tract infection. 

17. History of cardiac catheterization normal in 2010. 

18. History of cataracts. 

19. Anxiety, depression, not otherwise specified. 

20. Remote history nicotine dependence. 

21. FULL CODE. 

22. Obesity with body mass index of 33.6. 



RECOMMENDATIONS AND DISCUSSION: This 76-year-old woman who presented 

with multiple complex medical issues, we will monitor the patient 

closely. I would recommend clear liquids and advance if tolerated and 

I would also recommend to resume the home medications. Monitor 

closely. Also recommend a limited ultrasound focusing on the pancreas. 

We will continue with proton pump inhibitors. Gastroenterology 

evaluation also will be sought for continued followup. Otherwise, 

repeat labs also will be arranged. The prognosis is guarded because of 

multiple complex medical issues. Discussed with the patient and 

understands.  Further recommendations to follow. A copy of dictation 

forwarded to Dr. Martinez who is the primary physician. We also 

recommend DVT prophylaxis, pain medications and incentive spirometry 

also. X-ray has been requested. See orders for further details.  



MTDD

## 2017-03-14 NOTE — P.CONS
History of Present Illness





- Reason for Consult


Consult date: 17


Pancreatitis


Requesting physician: rOalia Castro





- History of Present Illness


76-year-old female patient Dr. Martinez with a past medical history of acalculus 

cholecystectomy, chronic kidney disease, diabetes mellitus, GERD, hypertension, 

hyperlipidemia, anxiety, depression.  Patient presents with severe 

midepigastric abdominal pain 2 days.  She was recently hospitalized last month 

for acute pancreatitis with Lipase 1026.  Amylase 139.  LFTs within normal 

limits.  Triglycerides 199.  Calcium 9.2.  Lipase on this admission is 854 

currently 372.  Amylase 143.  LFTs within normal limits.  She underwent EGD 

evaluation during last hospitalization to rule out peptic ulcer disease with 

findings of scattered antral erosions with LA grade a reflux esophagitis.





IgG subclass 4 within normal limits.  KIARA screen positive titer 1:160 

homogenous nucleolar.  Denies fever, chills, hematemesis, hematochezia, melena, 

peptic ulcer disease, alcoholism, or known pancreatic disorders.  





2017 CT abdomen reported no focal liver defect.  Spleen appeared normal 

with no evidence of pancreatic mass.





Abdominal ultrasound this morning is pending.











Review of Systems


Constitutional: Denies fever, chills, sweats, weight gain, or loss.


HEENT: Negative for migraines, blurred vision or loss, earaches, drainage, 

tinnitus, oral mucosal lesions, dysphagia, or odynophagia.


CARDIAC: Hyperlipidemia.  Hypertension.  Negative for chest pain, arrhythmias, 

or palpitation.


RESPIRATORY: Negative for shortness of breath, hemoptysis, cough, or sputum 

production.


GI: See HPI for pertinent findings.


: Negative for hematuria, urgency, frequency, polyuria, or dysuria.


GYNc: Denies possibility of pregnancy.  Negative vaginal discharge.


MUSCULOSKELETAL: Negative for muscle aches, swelling, arthritis, and 

arthralgias.  


NEUROLOGIC: Negative for stroke or TIA.


ENDOCRINE: Diabetes mellitus..


SKIN: Negative for rash or itching.


PSYCHIATRIC: depression and anxiety








All systems: negative (See HPI)





Past Medical History


Past Medical History: Diabetes Mellitus, GERD/Reflux, Hyperlipidemia, 

Hypertension, Osteoarthritis (OA)


Additional Past Medical History / Comment(s): Pt recently admitted 17 with 

idiopathic pancreatitis, esophagitis, gastritis, hiatal hernia.     Other hx:  

IDDM type II, possible vasospastic angina, sinus problems, arthritis in back, 

prone to UTIs.


History of Any Multi-Drug Resistant Organisms: None Reported


Past Surgical History: Cholecystectomy, Heart Catheterization, Tonsillectomy, 

Tubal Ligation


Additional Past Surgical History / Comment(s): EGD with bx 17,   2010 

cardiac cath-normal, bilateral cataract removal, colonoscopy.


Past Anesthesia/Blood Transfusion Reactions: No Reported Reaction


Additional Past Anesthesia/Blood Transfusion Reaction / Comm: Pt has 

clausterphobia.


Past Psychological History: Anxiety, Depression


Additional Psychological History / Comment(s): Pt resides alone.  She is 

independent.


Smoking Status: Former smoker


Past Alcohol Use History: Rare


Additional Past Alcohol Use History / Comment(s): Pt states she started smoking 

in  and quit in .


Past Drug Use History: None Reported





- Past Family History


  ** Father


Family Medical History: Cancer


Additional Family Medical History / Comment(s): Father  at the age of 81 

yrs of lung cancer.  He was a smoker.





  ** Mother


Family Medical History: Cancer


Additional Family Medical History / Comment(s): Mother had cervical cancer.  

She  at the age of 53 from her lupus.





Medications and Allergies


 Home Medications











 Medication  Instructions  Recorded  Confirmed  Type


 


Simvastatin [Zocor] 40 mg PO HS 14 History


 


glyBURIDE/METFORMIN HCL 2 tab PO BID 14 History





[Glucovance 5-500 mg Tablet]    


 


traMADol HCl [Ultram] 50 mg PO BID PRN 14 History


 


Cholecalciferol [Vitamin D3] 5,000 unit PO SA 17 History


 


Gabapentin [Neurontin] 100 mg PO HS 17 History


 


Insulin Glargine [Lantus] 42 unit SQ HS 17 History


 


Isosorbide Mononitrate ER [Imdur] 30 mg PO DAILY 17 History


 


LORazepam [Ativan] 0.5 mg PO HS PRN 17 History


 


Nitrofurantoin Macrocrystal 50 mg PO HS 17 History





[Macrodantin]    


 


Pantoprazole [Protonix] 40 mg PO DAILY 17 History


 


Enalapril [Vasotec] 20 mg PO BID 17 History


 


Insulin Aspart [NovoLOG] 5 unit SQ W/BRKFST 17 History


 


Insulin Aspart [NovoLOG] 7 unit SQ W/SUPPER 17 History


 


Timolol [Betimol 0.5% Ophth Soln] 1 drop BOTH EYES DAILY 17 

History


 


Triamterene-Hctz 37.5-25Mg 1 cap PO DAILY 17 History





[Dyazide 37.5-25 Capsule]    


 


amLODIPine [Norvasc] 5 mg PO DAILY 17 History











 Allergies











Allergy/AdvReac Type Severity Reaction Status Date / Time


 


latex Allergy  Rash/Hives Verified 17 09:03














Physical Exam


Vitals: 


 Vital Signs











  Temp Pulse Pulse Resp BP BP Pulse Ox


 


 17 07:57  97.5 F L   62  18   154/67  95


 


 17 04:00  97.7 F   65  16   117/64  97


 


 17 03:52     16   


 


 17 23:37     16   


 


 17 20:00     16   


 


 17 19:44  97.7 F   65  16   160/70  97


 


 17 14:51  97.5 F L   74  16   147/76  98


 


 17 14:17  98.1 F  75   16  106/56   99








 Intake and Output











 17





 22:59 06:59 14:59


 


Intake Total 433  


 


Balance 433  


 


Intake:   


 


  Oral 433  


 


Other:   


 


  Voiding Method Toilet Toilet 


 


  # Voids 2 2 


 


  Weight  83.4 kg 83.4 kg








 Patient Weight











 03/15/17





 06:59


 


Weight 83.4 kg











General appearance: The patient is alert, oriented, in no acute distress.


HET: Head is normocephalic and atraumatic.  Pupils are equal and reactive.  

Oropharynx is clear without lesions.


Neck: Supple without lymphadenopathy.  Trachea midline.


Heart: S1 S2.  Regular rate and rhythm.


Lungs: No crackles or wheezes are heard.


Abdomen: Soft, mild midepigastric tenderness, nondistended with  bowel sounds.  

No peritoneal signs.  No palpable organomegaly or masses.


Extremities: Normal skin color and turgor.  No cyanosis, rash, ulceration, 

clubbing, or edema.  Radial and pedal pulses are 2/4 bilaterally.


Neurological: No focal deficits.  Strength and sensation are grossly intact.








Results


CBC & Chem 7: 


 17 06:32





 17 06:32


Labs: 


 Abnormal Lab Results - Last 24 Hours (Table)











  17 Range/Units





  15:39 20:27 06:32 


 


Hgb    10.6 L  (11.4-16.0)  gm/dL


 


MCV    69.6 L  (80.0-100.0)  fL


 


MCH    20.5 L  (25.0-35.0)  pg


 


MCHC    29.5 L  (31.0-37.0)  g/dL


 


Chloride     ()  mmol/L


 


Carbon Dioxide     (22-30)  mmol/L


 


BUN     (7-17)  mg/dL


 


Creatinine     (0.52-1.04)  mg/dL


 


POC Glucose (mg/dL)  109 H  112 H   (75-99)  mg/dL


 


Triglycerides     (<150)  mg/dL


 


HDL Cholesterol     (40-60)  mg/dL


 


Lipase     ()  U/L














  17 Range/Units





  06:32 


 


Hgb   (11.4-16.0)  gm/dL


 


MCV   (80.0-100.0)  fL


 


MCH   (25.0-35.0)  pg


 


MCHC   (31.0-37.0)  g/dL


 


Chloride  110 H  ()  mmol/L


 


Carbon Dioxide  21 L  (22-30)  mmol/L


 


BUN  30 H  (7-17)  mg/dL


 


Creatinine  1.37 H  (0.52-1.04)  mg/dL


 


POC Glucose (mg/dL)   (75-99)  mg/dL


 


Triglycerides  198 H  (<150)  mg/dL


 


HDL Cholesterol  32 L  (40-60)  mg/dL


 


Lipase  372 H  ()  U/L











US - abdomen: pending





Assessment and Plan


(1) Acute pancreatitis


Narrative/Plan: 


Etiology of acute pancreatitis is unclear.  This is her second episode in 1 

month.  Possible autoimmune hepatitis.


Status: Acute   





(2) Positive KIARA (antinuclear antibody)


Status: Acute   





(3) Diabetes mellitus


Status: Chronic   


Plan: 


1.  Patient feels better today and her pancreatic enzymes have improved.  Will 

advance to low-fat diabetic diet.


2.  Outpatient MRI/MRCP/EUS advised.  Patient is claustrophobic and is 

requesting open MRI if indicated.


3.  Abdominal ultrasound completed; report pending.  We'll follow with you.  

Return to GI office in 1 week for reevaluation and discussion of outpatient 

workup of recurrent pancreatitis.  


4.  Supportive measures.  IV hydration.





Thank you for this kind referral and the opportunity to participate in the care 

of your patient.  This consultation was discussed with Dr. Pollock.  The 

impression and plan of care have been directed as dictated.

## 2017-03-14 NOTE — US
EXAMINATION TYPE: US abdomen limited

 

DATE OF EXAM: 3/14/2017 8:27 AM

 

COMPARISON: CT abdomen dated 21 February 2017

 

CLINICAL HISTORY: pancreatitis.

 

EXAM MEASUREMENTS:

 

Liver Length:  15.7 cm   

Gallbladder Wall:  Surgically absent cm   

CBD:  0.3 cm cm

Right Kidney:  11.6 x 4.9 x 4.4 cm cm

 

Pancreas:  wnl tail partially obscured by bowel gas

Liver:  Echotexture is heterogeneous  

Gallbladder:  Surgically absent

**Evidence for sonographic Jesus's sign:  no, entire abdomen tender

CBD:  wnl 

Right Kidney:  There is no hydronephrosis. 

 

There is no ascites.

 

IMPRESSION: There may be underlying hepatocellular disease, fatty infiltration of the liver.

Limitations as described.

## 2017-03-15 NOTE — P.PN
Subjective


Principal diagnosis: 





recurrent pancreatitis


Admitted with recurrent pancreatitis unclear etiology.  Tolerating low-fat 

diet.  Abdominal pain improved.  Afebrile.  Lipase 500.








Objective





- Vital Signs


Vital signs: 


 Vital Signs











Temp  97.9 F   03/15/17 07:44


 


Pulse  65   03/15/17 07:44


 


Resp  18   03/15/17 07:44


 


BP  150/70   03/15/17 07:44


 


Pulse Ox  96   03/15/17 07:44








 Intake & Output











 03/14/17 03/15/17 03/15/17





 18:59 06:59 18:59


 


Intake Total   240


 


Balance   240


 


Intake:   


 


  Oral   240


 


Other:   


 


  Voiding Method   Toilet














- Exam


General appearance: The patient is alert, oriented, in no acute distress.


HET: Head is normocephalic and atraumatic.  Pupils are equal and reactive.  

Oropharynx is clear without lesions.


Neck: Supple without lymphadenopathy.  Trachea midline.


Heart: S1 S2.  Regular rate and rhythm.


Lungs: No crackles or wheezes are heard.


Abdomen: Soft, very mild midepigastric upper abdomen discomfort, nondistended 

with  bowel sounds.  No peritoneal signs.  No palpable organomegaly or masses.


Extremities: Normal skin color and turgor.  No cyanosis, rash, ulceration, 

clubbing, or edema.  Radial and pedal pulses are 2/4 bilaterally.


Neurological: No focal deficits.  Strength and sensation are grossly intact.








- Labs


CBC & Chem 7: 


 03/15/17 06:55





 03/15/17 06:55





Assessment and Plan


(1) Acute pancreatitis


Narrative/Plan: 


Etiology of acute pancreatitis is unclear.  This is her second episode in 1 

month.  Possible autoimmune hepatitis.


Status: Acute   





(2) Positive KIARA (antinuclear antibody)


Status: Acute   





(3) Diabetes mellitus


Status: Chronic   


Plan: 


1.  Dr. Pollock requested patient to follow-up in office in 1 week for 

reevaluation and discussion of outpatient workup of pancreatitis at tertiary 

care center; EUS/open MRI secondary to claustrophobia.


2.  Agreeable for discharge today.





Assessment and plan a care discussed with Dr. Pollock.

## 2017-03-16 NOTE — DS
DATE OF ADMISSION:   03/15/2017

DATE OF DISCHARGE:   03/15/2017



DATE OF SERVICE:  03/15/2017



FINAL DIAGNOSES: 

1. Abdominal pain with acute recurrent pancreatitis. 

2. Acute on chronic renal failure with chronic kidney disease, stage 

III.  

3. Increased WBC. 

4. Microcytosis. 

5. Diabetes mellitus type 2.  

6. Gastroesophageal reflux disease. 

7. Hypertension. 

8. Hyperlipidemia. 

9. History of degenerative joint disease. 

10. History of significant reflux esophagitis. 

11. History of pancreatitis. 

12. History of hiatal hernia. 

13. History of vasospastic angina history. 

14. History of  sinus problems. 

15. History of degenerative joint disease and back pain. 

16. History of recurrent urinary tract infection. 

17. History of cardiac catheterization, which was normal in 2010. 

18. History of cataracts. 

19. Anxiety, depression, not otherwise specified. 

20. Remote history of nicotine dependence. 

21. Obesity with body mass index of 33.6. 

22. Rule out underlying hepatocellular disease. 

23. FULL CODE. 



DISCHARGE DISPOSITION: The patient will be discharged in stable 

condition with guarded prognosis Discharge cleared by 

Gastroenterology.  



HISTORY OF PRESENT ILLNESS: This 76-year-old woman with a past medical 

history of multiple medical problems being followed by Dr. Martinez in 

the outpatient setting admitted with abdominal pain,  recurrent 

pancreatitis. The patient was treated symptomatically. Patient 

improved significantly. Creatinine was 1.51 exam. The amylase is 

normal at 95. Lipase is 500.  Dr. Pollock saw the recommended MRI. Open 

MRI will be done and subsequent to that ERCP planned accordingly.  



On exam, vitals are stable. 

CARDIOVASCULAR:  S1 and S2, muffled. 

ABDOMEN:  Soft. 

NERVOUS SYSTEM:  No focal deficits. 



DISCHARGE ADVICE: 

1. Diet is cardiac. 

2. Follow up with Dr. Martinez in 2 to 3 days. 

3. Follow up with Dr. Pollock as recommended. 

4. Open MRI is being arranged by Gastroenterology. 



MEDICATIONS: 

1. Vitamin D3, 5000 daily. 

2. Neurontin 100 mg q.h.s. 

3. Norco 5 mg q.4 p.r.n. 

4. NovoLog 5 units with breakfast and NovoLog 7 units to supper. 

5. Lantus 42 units subcu q.h.s. 

6. Imdur ER 30 mg p.o. daily. 

7. Ativan 0.5 mg q.h.s. p.r.n. 

8. Zestril 40 mg p.o. b.i.d. 

9. Protonix 40 mg b.i.d. 

10. Zocor 40 mg q.h.s. 

11. Timolol one drop both eyes. 

12. Triamterene, Dyazide, 1 capsule daily. 

13. Norvasc 5 mg p.o. daily. 

14. Ultram 50 mg p.o. b.i.d.



Once again, the patient will be discharged in stable condition with 

guarded prognosis.

## 2017-10-05 NOTE — MM
Reason for exam: screening  (asymptomatic).

Last mammogram was performed 1 year and 1 month ago.



History:

Patient is postmenopausal.

Benign US left guided VAD of the left breast, February 26, 2010.

Took estrogen for 10 years beginning at age 54.



Physical Findings:

A clinical breast exam by your physician is recommended on an annual basis and 

results should be correlated with mammographic findings.



MG 3D Screening Mammo W/Cad

Bilateral CC and MLO view(s) were taken.

Prior study comparison: September 8, 2016, bilateral MG 3d screening mammo w/cad. 

May 18, 2015, bilateral MG screening mammo w CAD.  May 5, 2014, bilateral MG 

screening mammo w CAD.

There are scattered fibroglandular densities.  Previous mammotome biopsy within 

the left breast.  No significant changes when compared with prior studies.





ASSESSMENT: Negative, BI-RAD 1



RECOMMENDATION:

Routine screening mammogram of both breasts in 1 year.

## 2017-10-18 NOTE — MR
EXAMINATION TYPE: MR lumbar spine wo/w con

 

DATE OF EXAM: 10/18/2017

 

COMPARISON: NONE

 

HISTORY: lumbar radiculopathy

 

CONTRAST:  8.5 mL intravenous Gadavist.

 

TECHNIQUE: 

Multiplanar, multisequence images of the lumbar spine were acquired.

 

FINDINGS:  Cord terminates at the L1 level. Diffuse loss of disc hydration is present.

L5-S1: Mild subligamentous disc herniation is present with anterior thecal sac flattening. No spinal 
canal stenosis present. Neural foramen are patent. There is right facet hypertrophy. Some granulation
 tissue may be along the left foraminal or cysts. Correlate for prior instrumentation. No obvious herrera
inectomy is evident.

 

L4-L5: No significant disc bulge or disc herniation.  No spinal canal stenosis.  No foraminal stenosi
s.  Facet hypertrophy with posterior lateral thecal sac compression is present..

 

L3-L4: No significant disc bulge or disc herniation.  No spinal canal stenosis.  No foraminal stenosi
s.  Facet hypertrophy is present with posterior lateral thecal sac compression. Ligamentum flavum lax
ity is present, greater on the left. No lateral canal stenosis is present.

 

L2-L3: No significant disc bulge or disc herniation.  No spinal canal stenosis.  No foraminal stenosi
s. 

 

L1-L2: Mild disc bulging is anterior thecal sac flattening. No AP spinal canal stenosis present. Neur
al foramen are patent.  

 

T12-L1: No significant disc bulge or disc herniation.  No spinal canal stenosis.  No foraminal stenos
is. 

 

No abnormal enhancement.

 

IMPRESSION:

1. Small central subligamentous disc herniation may be present overlying the anterior spinal canal fl
attening. 

2. Facet hypertrophy L4-L5 S1 has some posterior lateral thecal sac.

3. Disc bulging L1-2 anterior sac flattening.

## 2018-09-15 NOTE — XR
EXAMINATION TYPE: XR humerus RT

 

DATE OF EXAM: 9/15/2018

 

COMPARISON: NONE

 

HISTORY: Pain after a fall

 

TECHNIQUE: 2 views

 

FINDINGS: There is impacted comminuted humeral neck fracture. There is no dislocation.

 

IMPRESSION: Acute mildly displaced impacted humeral neck fracture.

## 2018-09-15 NOTE — XR
EXAMINATION TYPE: XR elbow complete RT

 

DATE OF EXAM: 9/15/2018

 

COMPARISON: NONE

 

HISTORY: Pain after a fall

 

TECHNIQUE: 3 views

 

FINDINGS: There is some spurring on the olecranon process of the ulna. I see no fracture nor dislocat
ion.

 

IMPRESSION: Olecranon process spur formation. No definite fracture seen.

## 2018-09-15 NOTE — ED
General Adult HPI





- General


Chief complaint: Extremity Injury, Upper


Stated complaint: RT arm injury


Time Seen by Provider: 09/15/18 19:34


Source: patient


Mode of arrival: EMS


Limitations: no limitations





- History of Present Illness


Initial comments: 


Patient is a 78-year-old female who presents with a chief complaint of a 

mechanical fall at home around 5:30 this afternoon.  The patient states that 

she was watering her lawn and lost her balance when she went to turn the water 

off.  Patient states that she has pain in her right shoulder.  She denies loss 

of consciousness and can recall the entire event.  Patient states that she was 

able to get herself off and go into her kitchen to sit down.  Patient denies 

any other injury.  She denies any other symptoms including dizziness, 

lightheadedness, chest pain, shortness of breath.  She states her tetanus is up-

to-date.








- Related Data


 Home Medications











 Medication  Instructions  Recorded  Confirmed


 


Simvastatin [Zocor] 40 mg PO HS 14


 


traMADol HCl [Ultram] 50 mg PO BID PRN 14


 


Cholecalciferol [Vitamin D3] 5,000 unit PO SA 17


 


Gabapentin [Neurontin] 100 mg PO HS 17


 


Insulin Glargine [Lantus] 42 unit SQ HS 17


 


Isosorbide Mononitrate ER [Imdur] 30 mg PO DAILY 17


 


LORazepam [Ativan] 0.5 mg PO HS PRN 17


 


Insulin Aspart [NovoLOG 5 unit SQ W/BRKFST 17





(formulary)]   


 


Insulin Aspart [NovoLOG 7 unit SQ W/SUPPER 17





(formulary)]   


 


Timolol [Betimol 0.5% Ophth Soln] 1 drop BOTH EYES DAILY 17


 


Triamterene-Hctz 37.5-25Mg 1 cap PO DAILY 17





[Dyazide 37.5-25 Capsule]   


 


amLODIPine [Norvasc] 5 mg PO DAILY 17








 Previous Rx's











 Medication  Instructions  Recorded


 


HYDROcodone/APAP 5-325MG [Norco 1 each PO Q4HR PRN #20 tab 03/15/17





5-325]  


 


Lisinopril [Zestril] 40 mg PO BID #120 tab 03/15/17


 


Pantoprazole [Protonix] 40 mg PO AC-BID #60 tablet. 03/15/17


 


HYDROcodone/APAP 5-325MG [Norco 1 - 2 tab PO Q6HR PRN 3 Days #24 09/15/18





5-325] tab 











 Allergies











Allergy/AdvReac Type Severity Reaction Status Date / Time


 


latex Allergy  Rash/Hives Verified 17 09:03














Review of Systems


ROS Statement: 


Those systems with pertinent positive or pertinent negative responses have been 

documented in the HPI.





ROS Other: All systems not noted in ROS Statement are negative.


Musculoskeletal: Reports: arthralgia





Past Medical History


Past Medical History: Diabetes Mellitus, GERD/Reflux, Hyperlipidemia, 

Hypertension, Osteoarthritis (OA)


Additional Past Medical History / Comment(s): Pt recently admitted 17 with 

idiopathic pancreatitis, esophagitis, gastritis, hiatal hernia.     Other hx:  

IDDM type II, possible vasospastic angina, sinus problems, arthritis in back, 

prone to UTIs.


History of Any Multi-Drug Resistant Organisms: None Reported


Past Surgical History: Cholecystectomy, Heart Catheterization, Tonsillectomy, 

Tubal Ligation


Additional Past Surgical History / Comment(s): EGD with bx 17,   2010 

cardiac cath-normal, bilateral cataract removal, colonoscopy.


Past Anesthesia/Blood Transfusion Reactions: No Reported Reaction


Additional Past Anesthesia/Blood Transfusion Reaction / Comment(s): Pt has 

clausterphobia.


Past Psychological History: Anxiety, Depression


Smoking Status: Former smoker


Past Alcohol Use History: Rare


Past Drug Use History: None Reported





- Past Family History


  ** Father


Family Medical History: Cancer


Additional Family Medical History / Comment(s): Father  at the age of 81 

yrs of lung cancer.  He was a smoker.





  ** Mother


Family Medical History: Cancer


Additional Family Medical History / Comment(s): Mother had cervical cancer.  

She  at the age of 53 from her lupus.





General Exam


Limitations: no limitations


General appearance: alert, in no apparent distress


Head exam: Present: atraumatic, normocephalic


Eye exam: Present: normal appearance


ENT exam: Present: normal exam


Neck exam: Present: normal inspection.  Absent: tenderness


Respiratory exam: Present: normal lung sounds bilaterally.  Absent: respiratory 

distress, wheezes


Cardiovascular Exam: Present: regular rate, normal rhythm


GI/Abdominal exam: Present: soft.  Absent: distended, tenderness


Rectal exam: Present: deferred


Extremities exam: Present: tenderness (tenderness to palpation of the lateral 

right soulder.  no gross deformity noted, however exam limited 2/2 body 

habitus.  ), normal capillary refill.  Absent: full ROM, joint swelling


Back exam: Present: normal inspection


Neurological exam: Present: alert, oriented X3


Psychiatric exam: Present: normal affect, normal mood


Skin exam: Present: warm, dry, intact





Course


 Vital Signs











  09/15/18 09/15/18 09/15/18





  19:06 20:57 22:24


 


Temperature 98.8 F  


 


Pulse Rate 68 70 69


 


Respiratory 16 16 16





Rate   


 


Blood Pressure 150/69 162/74 137/72


 


O2 Sat by Pulse 99 99 97





Oximetry   














Medical Decision Making





- Medical Decision Making


Patient presents with a chief complaint mechanical fall and right shoulder 

pain.  On initial evaluation, vital signs are stable, patient is no acute 

distress.  Patient received 100 g of fentanyl en route and was given a dose of 

Norco in the emergency department.  Examination of the upper extremities shows 2

+ bilateral pulses, strength and sensation are intact in the radial, ulnar, and 

median nerve distribution.  Patient has tenderness to the right shoulder 

however there is no gross deformity.  Patient refusing c-collar, she will be 

sent for computed tomography scan of the head and neck without contrast.  X-

rays of the shoulder, humerus, and elbow will be obtained of the right arm.





10:33 PM


X-rays show a fracture of the right humeral neck that is comminuted and 

displaced.  This case was discussed with Dr. Tracey who states that the 

patient is appropriate to place an arm sling and discharged with close follow-

up to Dr. Jesus on Monday.  Patient given a dose of Norco prior to leaving the 

emergency department.  She was written a prescription for Norco for pain 

control.  I had a very lengthy discussion with the patient and her daughter 

regarding the limited use of her right arm and any safety concerns at home.  

The patient and her daughter state that they're able to take care of the 

patient at home and we'll get her to follow-up on Monday.  Opiate start talking 

form was filled out and I discussed the nature of the medication.  Patient 

verbalizes understanding.  All questions are answered to the best of my ability 

at this time, patient stable for discharge.





CT scans of the head and neck show no acute process.








Disposition


Clinical Impression: 


 Fracture of anatomical neck of humerus





Disposition: HOME SELF-CARE


Condition: Good


Instructions:  Arm Fracture in Adults (ED)


Prescriptions: 


HYDROcodone/APAP 5-325MG [Norco 5-325] 1 - 2 tab PO Q6HR PRN 3 Days #24 tab


 PRN Reason: Pain


Is patient prescribed a controlled substance at d/c from ED?: Yes


If prescribed controlled substance>3 days was MAPS reviewed?: Prescribed <3 Days


Referrals: 


Mckay Martinez MD [Primary Care Provider] - 1-2 days

## 2018-09-15 NOTE — CT
EXAMINATION TYPE: CT brain cspine wo con

 

DATE OF EXAM: 9/15/2018

 

COMPARISON: CT brain 2/21/2017

 

HISTORY: Headache. Neck pain. Fall.

 

CT DLP: 1568.4 mGycm

Automated exposure control for dose reduction was used.

 

TECHNIQUE: CT scan of the head and cervical spine are performed without contrast.

 

FINDINGS:   There is cerebral cortical atrophy. There is no mass effect nor midline shift. There is n
o sign of intracranial hemorrhage. There is mild hypodensity in the periventricular white matter. The
 calvarium is intact. There is some right frontal scalp soft tissue swelling.

 

Cervical vertebra have normal alignment. There are hypertrophic large anterior bridging osteophytes f
rom C3 to C7. The facet joints are intact. Skull base is intact.

 

IMPRESSION:

Cerebral atrophy and chronic small vessel ischemia. No change compared to old exam.

There is right frontal scalp soft tissue swelling.

Spondylotic changes with large hypertrophic anterior bridging osteophyte formation. No fracture.

## 2018-09-15 NOTE — XR
EXAMINATION TYPE: XR shoulder complete RT

 

DATE OF EXAM: 9/15/2018

 

COMPARISON: NONE

 

HISTORY: Pain after a fall

 

TECHNIQUE: 3 views

 

FINDINGS: There is impacted comminuted humeral neck fracture. There is displacement 1.5 cm. There is 
no dislocation. There is deformity of the clavicle consistent with old healed fracture.

 

IMPRESSION: Acute impacted comminuted humeral neck fracture.

## 2019-06-17 NOTE — ED
General Adult HPI





- General


Source: patient, EMS


Mode of arrival: EMS


Limitations: no limitations





<Dorothea Bergeron - Last Filed: 19 19:37>





<Kade Ramos - Last Filed: 19 20:20>





- General


Chief complaint: Recheck/Abnormal Lab/Rx


Stated complaint: Recheck


Time Seen by Provider: 19 16:57





- History of Present Illness


Initial comments: 


78-year-old female patient presents to the emergency department today requesting

assistance with placement in an Senior living facility or apartment.  Patient 

states that she lives at home alone, states that her home is too much for her to

handle, states she is very depressed and wishes she would die.  Patient denies 

any active suicidal ideation.  States she would never kill herself, she just 

wishes she would die.  Patient states that she does feel well overall.  Denies 

any current physical symptoms or concerns. Patient denies any recent rash, 

fever, chills, shortness breath, chest pain, abdominal pain, nausea, vomiting, 

diarrhea, constipation, back pain, numbness, tingling, dizziness, weakness, 

hematuria, dysuria, urinary urgency, urinary frequency, headache, visual 

changes, or any other complaints.


 (Dorothea Bergeron)





- Related Data


                                Home Medications











 Medication  Instructions  Recorded  Confirmed


 


Simvastatin [Zocor] 40 mg PO DAILY 14


 


LORazepam [Ativan] 0.5 mg PO HS PRN 17


 


Timolol [Betimol 0.5% Ophth Soln] 1 drop BOTH EYES DAILY 17


 


Triamterene-Hctz 37.5-25Mg 1 cap PO DAILY 17





[Dyazide 37.5-25 Capsule]   


 


Cyanocobalamin (Vitamin B-12) 1,000 mcg PO DAILY 19





[Vitamin B-12]   


 


Enalapril Maleate [Vasotec] 20 mg PO BID 19


 


Gabapentin [Neurontin] 300 mg PO TID 19


 


Insulin NPH Hum/Reg Insulin Hm 30 unit SQ HS 19





[NovoLIN 70-30 100 UNIT/ML VIAL]   


 


Insulin NPH Hum/Reg Insulin Hm 35 unit SQ QAM 19





[NovoLIN 70-30 100 UNIT/ML VIAL]   


 


Magnesium 200 mg PO DAILY 19


 


Pantoprazole [Protonix] 40 mg PO BID 19


 


Pioglitazone HCl [Actos] 15 mg PO DAILY 19


 


Sertraline HCl [Zoloft] 50 mg PO DAILY 19


 


Spironolactone 25 mg PO DAILY 19











                                    Allergies











Allergy/AdvReac Type Severity Reaction Status Date / Time


 


latex Allergy  Rash/Hives Verified 19 17:06














Review of Systems


ROS Other: All systems not noted in ROS Statement are negative.





<Dorothea Bergeron - Last Filed: 19 19:37>


ROS Other: All systems not noted in ROS Statement are negative.





<Kade Ramos - Last Filed: 19 20:20>


ROS Statement: 


Those systems with pertinent positive or pertinent negative responses have been 

documented in the HPI.








Past Medical History


Past Medical History: Diabetes Mellitus, GERD/Reflux, Hyperlipidemia, 

Hypertension, Osteoarthritis (OA)


Additional Past Medical History / Comment(s): Pt recently admitted 17 with 

idiopathic pancreatitis, esophagitis, gastritis, hiatal hernia.     Other hx:  

IDDM type II, possible vasospastic angina, sinus problems, arthritis in back, 

prone to UTIs.


History of Any Multi-Drug Resistant Organisms: None Reported


Past Surgical History: Cholecystectomy, Heart Catheterization, Tonsillectomy, 

Tubal Ligation


Additional Past Surgical History / Comment(s): EGD with bx 17,   2010 

cardiac cath-normal, bilateral cataract removal, colonoscopy.


Past Anesthesia/Blood Transfusion Reactions: No Reported Reaction


Additional Past Anesthesia/Blood Transfusion Reaction / Comment(s): Pt has c

lausterphobia.


Past Psychological History: Anxiety, Depression


Smoking Status: Former smoker


Past Alcohol Use History: Rare


Past Drug Use History: None Reported





- Past Family History


  ** Father


Family Medical History: Cancer


Additional Family Medical History / Comment(s): Father  at the age of 81 yrs

 of lung cancer.  He was a smoker.





  ** Mother


Family Medical History: Cancer


Additional Family Medical History / Comment(s): Mother had cervical cancer.  She

  at the age of 53 from her lupus.





<Dorothea Bergeron - Last Filed: 19 19:37>





General Exam


Limitations: no limitations


General appearance: alert, in no apparent distress, other (Physical well-

developed, well-nourished elderly female patient in no acute distress.  Vital 

signs upon presentation are temperature 98.4F, pulse 94, respirations 18, blood

 pressure 118/100, pulse ox 96% on room air.)


Eye exam: Present: normal appearance, PERRL, EOMI.  Absent: scleral icterus, 

conjunctival injection, periorbital swelling


ENT exam: Present: normal exam, normal oropharynx, mucous membranes moist


Respiratory exam: Present: normal lung sounds bilaterally.  Absent: respiratory 

distress, wheezes, rales, rhonchi, stridor


Cardiovascular Exam: Present: tachycardia, irregular rhythm, normal heart 

sounds.  Absent: systolic murmur, diastolic murmur, rubs, gallop, clicks


GI/Abdominal exam: Present: soft, normal bowel sounds.  Absent: distended, 

tenderness, guarding, rebound, rigid


Neurological exam: Present: alert, oriented X3, CN II-XII intact


Psychiatric exam: Present: normal affect, normal mood


Skin exam: Present: warm, dry, intact, normal color.  Absent: rash





<Dorothea Bergeron - Last Filed: 19 19:37>





Course


                                   Vital Signs











  19





  16:48 18:47


 


Temperature 98.4 F 


 


Pulse Rate 94 115 H


 


Respiratory 18 16





Rate  


 


Blood Pressure 118/100 117/81


 


O2 Sat by Pulse 96 96





Oximetry  














EKG Findings





- EKG Comments:


EKG Findings:: EKG obtained at 1641 shows atrial fibrillation with a ventricular

 rate of 83, QRS duration 74, , .  No evidence of ST elevation or 

depression.





<Dorothea Bergeron - Last Filed: 19 19:37>





Medical Decision Making





- Lab Data


Result diagrams: 


                                 19 17:40





                                 19 18:38





- Radiology Data


Radiology results: report reviewed, image reviewed





<Dorothea Bergeron - Last Filed: 19 19:37>





- Lab Data


Result diagrams: 


                                 19 17:40





                                 19 18:38





<Kade Ramos - Last Filed: 19 20:20>





- Medical Decision Making


78-year-old female patient presented to the emergency department today for 

evaluation of depression and hopelessness.  Physical examination was relatively 

unremarkable.  Patient was noted to have elevated heart rate upon arrival, EKG 

was performed and showed atrial fibrillation with a normal ventricular rate.  On

 the cardiac monitor patient did have evidence for rapid ventricular response 

with heart rates between 110 and 120.  Labs reviewed and did reveal elevated 

white blood cell count at 12.0, potassium 6.0, BUN 56, creatinine 1.74.  Review 

of previous medical records and EKGs revealed that patient's atrial fibrillation

 is new onset.  We did start IV heparin and Cardizem.  Patient does admit to 

being depressed and hopeless, she denies any active suicidal ideation.  We will 

consult cardiology for further evaluation of the nature of fibrillation.  We 

will consult psychiatry and also social work.  I did discuss findings, results, 

plan with the patient, she is agreeable.


 (Dorothea Bergeron)


Patient reevaluated by myself, Dr. Ramos.  Patient resting comfortably in bed.  

Patient has new onset atrial fibrillation, rate is currently 105.  Patient and 

family is updated on results and plan.  Some physician group has been paged, 

covering for admission for Dr. Apple.  I did reexamine and reevaluate patient.  

This includes diagnostic interpretation and treatment plan.





Case was discussed with Dr. alarcon, who will admit. (Kade Ramos)





- Lab Data


                                   Lab Results











  19 Range/Units





  17:40 17:40 17:40 


 


WBC  12.0 H    (3.8-10.6)  k/uL


 


RBC  6.18 H    (3.80-5.40)  m/uL


 


Hgb  12.2    (11.4-16.0)  gm/dL


 


Hct  40.1    (34.0-46.0)  %


 


MCV  64.8 L    (80.0-100.0)  fL


 


MCH  19.8 L    (25.0-35.0)  pg


 


MCHC  30.5 L    (31.0-37.0)  g/dL


 


RDW  14.8    (11.5-15.5)  %


 


Plt Count  336    (150-450)  k/uL


 


Neutrophils %  61    %


 


Lymphocytes %  30    %


 


Monocytes %  6    %


 


Eosinophils %  2    %


 


Basophils %  0    %


 


Neutrophils #  7.4    (1.3-7.7)  k/uL


 


Lymphocytes #  3.6    (1.0-4.8)  k/uL


 


Monocytes #  0.7    (0-1.0)  k/uL


 


Eosinophils #  0.2    (0-0.7)  k/uL


 


Basophils #  0.0    (0-0.2)  k/uL


 


Hypochromasia  Marked    


 


Microcytosis  Marked    


 


PT    11.2  (9.0-12.0)  sec


 


INR    1.1  (<1.2)  


 


APTT    23.7  (22.0-30.0)  sec


 


Sodium   Not Reportable   


 


Potassium   Not Reportable   


 


Chloride   Not Reportable   


 


Carbon Dioxide   Not Reportable   


 


Anion Gap   Not Reportable   


 


BUN   Not Reportable   


 


Creatinine   Not Reportable   


 


Est GFR (CKD-EPI)AfAm   Not Reportable   


 


Est GFR (CKD-EPI)NonAf   Not Reportable   


 


Glucose   Not Reportable   


 


Calcium   Not Reportable   


 


Magnesium   Not Reportable   


 


Total Bilirubin   Not Reportable   


 


AST   Not Reportable   


 


ALT   Not Reportable   


 


Alkaline Phosphatase   Not Reportable   


 


Troponin I     (0.000-0.034)  ng/mL


 


Total Protein   Not Reportable   


 


Albumin   Not Reportable   


 


TSH   0.023 L   (0.465-4.680)  mIU/L


 


Urine Color     


 


Urine Appearance     (Clear)  


 


Urine pH     (5.0-8.0)  


 


Ur Specific Gravity     (1.001-1.035)  


 


Urine Protein     (Negative)  


 


Urine Glucose (UA)     (Negative)  


 


Urine Ketones     (Negative)  


 


Urine Blood     (Negative)  


 


Urine Nitrite     (Negative)  


 


Urine Bilirubin     (Negative)  


 


Urine Urobilinogen     (<2.0)  mg/dL


 


Ur Leukocyte Esterase     (Negative)  


 


Urine RBC     (0-5)  /hpf


 


Urine WBC     (0-5)  /hpf


 


Ur Squamous Epith Cells     (0-4)  /hpf


 


Urine Bacteria     (None)  /hpf


 


Hyaline Casts     (0-2)  /lpf


 


Urine Mucus     (None)  /hpf














  19 Range/Units





  17:40 18:38 Unknown 


 


WBC     (3.8-10.6)  k/uL


 


RBC     (3.80-5.40)  m/uL


 


Hgb     (11.4-16.0)  gm/dL


 


Hct     (34.0-46.0)  %


 


MCV     (80.0-100.0)  fL


 


MCH     (25.0-35.0)  pg


 


MCHC     (31.0-37.0)  g/dL


 


RDW     (11.5-15.5)  %


 


Plt Count     (150-450)  k/uL


 


Neutrophils %     %


 


Lymphocytes %     %


 


Monocytes %     %


 


Eosinophils %     %


 


Basophils %     %


 


Neutrophils #     (1.3-7.7)  k/uL


 


Lymphocytes #     (1.0-4.8)  k/uL


 


Monocytes #     (0-1.0)  k/uL


 


Eosinophils #     (0-0.7)  k/uL


 


Basophils #     (0-0.2)  k/uL


 


Hypochromasia     


 


Microcytosis     


 


PT     (9.0-12.0)  sec


 


INR     (<1.2)  


 


APTT     (22.0-30.0)  sec


 


Sodium   137   


 


Potassium   6.0 H   


 


Chloride   106   


 


Carbon Dioxide   19 L   


 


Anion Gap   12   


 


BUN   56 H   


 


Creatinine   1.74 H   


 


Est GFR (CKD-EPI)AfAm   32   


 


Est GFR (CKD-EPI)NonAf   28   


 


Glucose   75   


 


Calcium   9.7   


 


Magnesium   1.7   


 


Total Bilirubin   0.4   


 


AST   24   


 


ALT   15   


 


Alkaline Phosphatase   121   


 


Troponin I  0.014    (0.000-0.034)  ng/mL


 


Total Protein   7.2   


 


Albumin   3.9   


 


TSH     (0.465-4.680)  mIU/L


 


Urine Color    Yellow  


 


Urine Appearance    Clear  (Clear)  


 


Urine pH    5.5  (5.0-8.0)  


 


Ur Specific Gravity    1.017  (1.001-1.035)  


 


Urine Protein    Negative  (Negative)  


 


Urine Glucose (UA)    Trace H  (Negative)  


 


Urine Ketones    Negative  (Negative)  


 


Urine Blood    Negative  (Negative)  


 


Urine Nitrite    Negative  (Negative)  


 


Urine Bilirubin    Negative  (Negative)  


 


Urine Urobilinogen    <2.0  (<2.0)  mg/dL


 


Ur Leukocyte Esterase    Moderate H  (Negative)  


 


Urine RBC    1  (0-5)  /hpf


 


Urine WBC    8 H  (0-5)  /hpf


 


Ur Squamous Epith Cells    4  (0-4)  /hpf


 


Urine Bacteria    Many H  (None)  /hpf


 


Hyaline Casts    1  (0-2)  /lpf


 


Urine Mucus    Rare H  (None)  /hpf














- Radiology Data


Two-view x-ray of the chest is obtained.  Report was reviewed in its entirety.  

Impression by Dr. Tinoco shows minimal scarring or subsegmental atelectasis 

in the left lower lobe is mostly cleared compared to old exam.  No heart failu

re, there is improved inspection. (Dorothea Bergeron)





Disposition


Decision to Admit Reason: Admit from EC


Decision Date: 19


Decision Time: 19:38





<Dorothea Bergeron - Last Filed: 19 19:37>





<Kade Ramos - Last Filed: 19 20:20>


Clinical Impression: 


 New onset a-fib, Depression, Hyperkalemia





Disposition: ADMITTED AS IP TO THIS [facility]


Condition: Serious


Referrals: 


Mckay Martinez MD [Primary Care Provider] - 1-2 days

## 2019-06-17 NOTE — XR
EXAMINATION TYPE: XR chest 2V

 

DATE OF EXAM: 6/17/2019

 

COMPARISON: 3/13/2017

 

HISTORY: Low blood pressure. Dysrhythmia

 

TECHNIQUE:  Frontal and lateral views of the chest are obtained.

 

FINDINGS:  There is no heart failure nor confluent pneumonic infiltrate. There is some linear density
 left lower lobe. The other lung fields are fairly clear. There are chest leads. Bony thorax is intac
t.

 

IMPRESSION:  Minimal scarring or subsegmental atelectasis in the left lower lobe is mostly cleared co
mpared to old exam. No heart failure. There is improved inspiration.

## 2019-06-18 NOTE — US
EXAMINATION TYPE: US thyroid st tissue head/neck

 

DATE OF EXAM: 6/18/2019

 

COMPARISON: NONE

 

CLINICAL HISTORY: Hyperthyroidism. Hyperthyroidism per order.

 

GLAND SIZE:

 

Right Lobe: 4.7 x 2.1 x 2.3 cm

** Overall Parenchyma:  Heterogeneous. Very indistinct borders.

Left Lobe: 5.1 x 1.3 x 2.1 cm

** Overall Parenchyma:  Heterogeneous.

Isthmus Thickness:  0.34 cm

 

NODULES

 

RIGHT:   # of nodules measured on right: 0

 

 

LEFT:    # of nodules measured on left:  0

 

 

ISTHMUS:    # of nodules measured in the isthmus:  0

 

 

*Limited due to indistinct borders and heterogeneous thyroid texture.

 

 

IMPRESSION:

Heterogeneous thyroid gland. No discrete or dominant mass seen.

## 2019-06-18 NOTE — P.HPIM
History of Present Illness


H&P Date: 19





The patient is a 77 yo F with a PMH of DM and HTN who presents to the ED w/ 

complaints of fatigue and inability to care for herself over the past 1-2 

months. The patient reports that she doesn't feel quite like herself over this 

time frame. She notes feeling tired far more frequently and states that since 

she lives by herself, she feels she isn't able to manage. She continues to 

ambulate with a walker though with more difficulty recently. She also reported 

multiple episodes of hypoglycemia at home, w/ it being 43 earlier today upon 

arrival of the EMS. She notes no prior history of Afib and denied chest pain, 

SOB, fever, or chills. Also denied cough, abdominal pain, nausea, vomiting, or 

sick contacts. She underwent an extensive evaluation in the ED and was noted to 

have Afib w/ RVR, w/ CXR showing some chronic changes. Laboratory evaluation 

revealed a K of 6.0, BUN 56, Cr 1.74, WBC 12, and Hgb 12.2, and glucose 75. She 

was started on a cardizem infusion and is being admitted to the medicine service

for further management. 





Review of Systems





Pertinent positives and negatives as discussed in HPI, a complete review of 

systems was performed and all other systems are negative.





Past Medical History


Past Medical History: Diabetes Mellitus, GERD/Reflux, Hyperlipidemia, 

Hypertension, Osteoarthritis (OA)


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 

pancreatitis, esophagitis, gastritis, hiatal hernia.     Other hx:  IDDM type 

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 

UTIs.


History of Any Multi-Drug Resistant Organisms: None Reported


Past Surgical History: Cholecystectomy, Heart Catheterization, Tonsillectomy, 

Tubal Ligation


Additional Past Surgical History / Comment(s): EGD with bx 17,   2010 

cardiac cath-normal, bilateral cataract removal, colonoscopy.


Past Anesthesia/Blood Transfusion Reactions: No Reported Reaction


Additional Past Anesthesia/Blood Transfusion Reaction / Comment(s): Pt has cl

austerphobia.


Past Psychological History: Anxiety, Depression


Additional Psychological History / Comment(s): Pt resides alone.  She is 

independent.


Smoking Status: Former smoker


Past Alcohol Use History: Rare


Additional Past Alcohol Use History / Comment(s): Pt states she started smoking 

in 1956 and quit in .


Past Drug Use History: None Reported





- Past Family History


  ** Father


Family Medical History: Cancer


Additional Family Medical History / Comment(s): Father  at the age of 81 yrs

of lung cancer.  He was a smoker.





  ** Mother


Family Medical History: Cancer


Additional Family Medical History / Comment(s): Mother had cervical cancer.  She

 at the age of 53 from her lupus.





Medications and Allergies


                                Home Medications











 Medication  Instructions  Recorded  Confirmed  Type


 


Simvastatin [Zocor] 40 mg PO DAILY 14 History


 


LORazepam [Ativan] 0.5 mg PO HS PRN 17 History


 


Timolol [Betimol 0.5% Ophth Soln] 1 drop BOTH EYES DAILY 17 

History


 


Triamterene-Hctz 37.5-25Mg 1 cap PO DAILY 17 History





[Dyazide 37.5-25 Capsule]    


 


Cyanocobalamin (Vitamin B-12) 1,000 mcg PO DAILY 19 History





[Vitamin B-12]    


 


Enalapril Maleate [Vasotec] 20 mg PO BID 19 History


 


Gabapentin [Neurontin] 300 mg PO TID 19 History


 


Insulin NPH Hum/Reg Insulin Hm 30 unit SQ HS 19 History





[NovoLIN 70-30 100 UNIT/ML VIAL]    


 


Insulin NPH Hum/Reg Insulin Hm 35 unit SQ QAM 19 History





[NovoLIN 70-30 100 UNIT/ML VIAL]    


 


Magnesium 200 mg PO DAILY 19 History


 


Pantoprazole [Protonix] 40 mg PO BID 19 History


 


Pioglitazone HCl [Actos] 15 mg PO DAILY 19 History


 


Sertraline HCl [Zoloft] 50 mg PO DAILY 19 History


 


Spironolactone 25 mg PO DAILY 19 History








                                    Allergies











Allergy/AdvReac Type Severity Reaction Status Date / Time


 


latex Allergy  Rash/Hives Verified 19 17:06














Physical Exam


Vitals: 


                                   Vital Signs











  Temp Pulse Pulse Resp BP BP Pulse Ox


 


 19 23:37    103 H  16   


 


 19 23:34  97.9 F   103 H  16   94/58  95


 


 19 21:54  97.4 F L   111 H  18   119/69  98


 


 19 20:30   101 H   10 L  105/52   97


 


 19 20:00   112 H   30 H  103/91   97


 


 19 19:30   131 H   12  134/81  


 


 19 19:00   105 H   17  117/81   91 L


 


 19 18:47   115 H   16  117/81   96


 


 19 17:30   99   37 H  127/91  


 


 19 17:00   98   18  117/82  


 


 19 16:48  98.4 F  94   18  118/100   96


 


 19 16:38     29 H    84 L








                                Intake and Output











 19





 14:59 22:59 06:59


 


Intake Total  3.167 


 


Balance  3.167 


 


Intake:   


 


  Intake, IV Titration  3.167 





  Amount   


 


    Diltiazem 125 mg In  3.167 





    Sodium Chloride 0.9% 100   





    ml @ 5 MG/HR 5 mls/hr IV   





    .Q24H [facility] Rx#:[id number]   


 


Other:   


 


  Voiding Method   Bedside Commode


 


  Weight  81.647 kg 














General: non toxic, no distress, appears at stated age, obese


Derm: no unusual rashes/lesions no unusual ecchymoses, warm, dry


Head: atraumatic, normocephalic, symmetric


Eyes: EOMI, no lid lag, anicteric sclera, pupils equal round reactive to light


ENT: Nose and ears atraumatic, no thrush,  no pharyngeal erythema


Neck: No thyromegaly, no cervical lymphadenopathy, trachea midline, supple


Mouth: no lip lesion, mucus membranes moist


Cardiovascular: S1S2 irregularly irregular, no murmur, positive posterior tibial

 pulse bilateral, no edema, capillary refill less than 2 seconds


Lungs: CTA bilateral, no rhonchi, no rales , no accessory muscle use


Abdominal: soft,  nontender to palpation, no guarding, no appreciable 

organomegaly, normal bowel sounds


Ext: no gross muscle atrophy,  muscle strength 5 out of 5 in all 4 extremities 

grossly, no contractures, 


Neuro:  CN II-XI grossly intact, light touch intact all 4 extremities, finger to

 nose within normal limits,


Psych: Alert, oriented, appropriate affect 





Results


CBC & Chem 7: 


                                 19 17:40





                                 19 18:38


Labs: 


                  Abnormal Lab Results - Last 24 Hours (Table)











  19 Range/Units





  17:40 17:40 18:38 


 


WBC  12.0 H    (3.8-10.6)  k/uL


 


RBC  6.18 H    (3.80-5.40)  m/uL


 


MCV  64.8 L    (80.0-100.0)  fL


 


MCH  19.8 L    (25.0-35.0)  pg


 


MCHC  30.5 L    (31.0-37.0)  g/dL


 


Potassium    6.0 H  (3.5-5.1)  mmol/L


 


Carbon Dioxide    19 L  (22-30)  mmol/L


 


BUN    56 H  (7-17)  mg/dL


 


Creatinine    1.74 H  (0.52-1.04)  mg/dL


 


POC Glucose (mg/dL)     (75-99)  mg/dL


 


TSH   0.023 L   (0.465-4.680)  mIU/L


 


Free T4     (0.78-2.19)  ng/dL


 


Urine Glucose (UA)     (Negative)  


 


Ur Leukocyte Esterase     (Negative)  


 


Urine WBC     (0-5)  /hpf


 


Urine Bacteria     (None)  /hpf


 


Urine Mucus     (None)  /hpf














  19 Range/Units





  18:38 22:18 22:35 


 


WBC     (3.8-10.6)  k/uL


 


RBC     (3.80-5.40)  m/uL


 


MCV     (80.0-100.0)  fL


 


MCH     (25.0-35.0)  pg


 


MCHC     (31.0-37.0)  g/dL


 


Potassium     (3.5-5.1)  mmol/L


 


Carbon Dioxide     (22-30)  mmol/L


 


BUN     (7-17)  mg/dL


 


Creatinine     (0.52-1.04)  mg/dL


 


POC Glucose (mg/dL)   39 L  171 H  (75-99)  mg/dL


 


TSH     (0.465-4.680)  mIU/L


 


Free T4  3.43 H    (0.78-2.19)  ng/dL


 


Urine Glucose (UA)     (Negative)  


 


Ur Leukocyte Esterase     (Negative)  


 


Urine WBC     (0-5)  /hpf


 


Urine Bacteria     (None)  /hpf


 


Urine Mucus     (None)  /hpf














  19 Range/Units





  Unknown 


 


WBC   (3.8-10.6)  k/uL


 


RBC   (3.80-5.40)  m/uL


 


MCV   (80.0-100.0)  fL


 


MCH   (25.0-35.0)  pg


 


MCHC   (31.0-37.0)  g/dL


 


Potassium   (3.5-5.1)  mmol/L


 


Carbon Dioxide   (22-30)  mmol/L


 


BUN   (7-17)  mg/dL


 


Creatinine   (0.52-1.04)  mg/dL


 


POC Glucose (mg/dL)   (75-99)  mg/dL


 


TSH   (0.465-4.680)  mIU/L


 


Free T4   (0.78-2.19)  ng/dL


 


Urine Glucose (UA)  Trace H  (Negative)  


 


Ur Leukocyte Esterase  Moderate H  (Negative)  


 


Urine WBC  8 H  (0-5)  /hpf


 


Urine Bacteria  Many H  (None)  /hpf


 


Urine Mucus  Rare H  (None)  /hpf














Thrombosis Risk Factor Assmnt





- Choose All That Apply


Any of the Below Risk Factors Present?: Yes


Each Factor Represents 1 point: Obesity (BMI >25)


Each Risk Factor Represents 3 Points: Age 75 years or older


Other congenital or acquired thrombophilia - If yes, enter type in comment: No


Thrombosis Risk Factor Assessment Total Risk Factor Score: 4


Thrombosis Risk Factor Assessment Level: Moderate Risk





Assessment and Plan


Plan: 





Newly diagnosed Afib w/ RVR in setting of hyperthyroidism


-C/w Cardizem infusion 


-Will start Lopressor 50 mg bid. DC home antihypertensives


-Cardiac monitoring


-C/w Heparin infusion


-F/u Echocardiogram. Switch to possible NOAC following Echo. 





Hyperthyroidism


-Check TSH receptor abs


-Obtain Thyroid scan. Will consider starting Methimazole vs PTU following scan





Hyperkalemia


-Will hold Spironolactone and Triamterene-HCTZ


-Received Kayexalate in ED. Hold off in Insulin IVP due to hypoglycemia


-Monitor for now


-C/w Cardiac monitoring





DM w/ hypoglycemia


-Will decrease home insulin doses to 50%


-C/w blood glucose monitoring and sliding scale





Leukocytosis


-No clear signs of infection


-Monitor for now





CKD, stable


-Monitor for now





DVT prophylaxis


-Heparin infusion





The patient is admitted with an anticipated greater than 2 midnight stay for 

evaluation of afib w/ rvr.


CODE STATUS: Full Code


Discussed with: Patient


Anticipated discharge date: 19


Anticipated discharge place: Home


A total of 50 minutes was spent on the care of this complex patient more than 

50% of the time was spent in counseling and care coordination.

## 2019-06-18 NOTE — P.PN
Subjective


Progress Note Date: 06/18/19





Patient seen and examined at bedside, flat affect emotional teary when talking 

about her new diagnosis of A. fib and hyperthyroidism.  Consult placed for 

psychiatric evaluation.  Patient continued on Cardizem, a.m. potassium 5.2, 

creatinine 1.8, A1c 8.4. 





Objective





- Vital Signs


Vital signs: 


                                   Vital Signs











Temp  98.3 F   06/18/19 08:30


 


Pulse  85   06/18/19 12:20


 


Resp  18   06/18/19 12:20


 


BP  101/51   06/18/19 12:20


 


Pulse Ox  97   06/18/19 12:20








                                 Intake & Output











 06/17/19 06/18/19 06/18/19





 18:59 06:59 18:59


 


Intake Total  60.322 361.833


 


Output Total  300 400


 


Balance  -239.678 -38.167


 


Weight 81.647 kg 77.4 kg 


 


Intake:   


 


  Intake, IV Titration  60.322 121.833





  Amount   


 


    Diltiazem 125 mg In  3.167 121.833





    Sodium Chloride 0.9% 100   





    ml @ 5 MG/HR 5 mls/hr IV   





    .Q24H [facility] Rx#:[id number]   


 


    Heparin Sod,Pork in 0.45%  57.155 





    NaCl 25,000 unit In 0.45   





    % NaCl 1 250ml.bag @ 12   





    UNITS/KG/HR 9.798 mls/hr   





    IV .Q24H [facility] Rx#:   





    [id number]   


 


  Oral   240


 


Output:   


 


  Urine  300 400


 


Other:   


 


  Voiding Method  Bedside Commode Bedside Commode


 


  # Voids  1 














- Exam





Constitutional: No acute distress, conversant, pleasant





Eyes: Anicteric sclerae, moist conjunctiva, no lid-lag, PERRLA





ENMT: NC/AT,Oropharynx clear, no erythema, exudates





Neck:Supple, FROM, no masses, or JVD, No carotid bruits; No thyromegaly





Lungs: Clear to auscultation, Clear to percussion, Normal respiratory effort, no

accessory muscle use 





Cardiovascular: Heart regular in rate and rhythm,  No murmurs, gallops, or rubs 

no peripheral edema





Abdominal: Soft Nontender, nom distended, no guarding, no rebound or  rigidity, 

Normoactive bowel sounds No hepatomegaly, No splenomegaly,  No palpable mass No 

abdominal wall hernia noted 





Skin: Normal temperature, tone, texture, turgor, No induration No subcutaneous 

nodules, No rash, lesions, No ulcers





Extremities:No digital cyanosis No clubbing, Pedal pulses intact and  

symmetrical Radial pulses intact and symmetrical Normal gait and station, No 

calf tenderness


 


Psychiatric: Alert and oriented to person, place and time, Appropriate affect 

Intact judgement   


      


Neuro: Muscles Strength 5/5 in all 4 extremities, Sensation to light touch 

grossly present throughout, Cranial nerves II-XII grossly intact. No focal 

sensory deficits








- Labs


CBC & Chem 7: 


                                 06/18/19 06:16





                                 06/18/19 17:16


Labs: 


                  Abnormal Lab Results - Last 24 Hours (Table)











  06/17/19 06/17/19 06/17/19 Range/Units





  18:38 18:38 22:18 


 


RBC     (3.80-5.40)  m/uL


 


Hgb     (11.4-16.0)  gm/dL


 


MCV     (80.0-100.0)  fL


 


MCH     (25.0-35.0)  pg


 


MCHC     (31.0-37.0)  g/dL


 


APTT     (22.0-30.0)  sec


 


Potassium  6.0 H    (3.5-5.1)  mmol/L


 


Carbon Dioxide  19 L    (22-30)  mmol/L


 


BUN  56 H    (7-17)  mg/dL


 


Creatinine  1.74 H    (0.52-1.04)  mg/dL


 


Glucose     (74-99)  mg/dL


 


POC Glucose (mg/dL)    39 L  (75-99)  mg/dL


 


Hemoglobin A1c     (4.0-6.0)  %


 


Calcium     (8.4-10.2)  mg/dL


 


TSH     (0.465-4.680)  mIU/L


 


Free T4   3.43 H   (0.78-2.19)  ng/dL














  06/17/19 06/18/19 06/18/19 Range/Units





  22:35 00:50 00:50 


 


RBC     (3.80-5.40)  m/uL


 


Hgb     (11.4-16.0)  gm/dL


 


MCV     (80.0-100.0)  fL


 


MCH     (25.0-35.0)  pg


 


MCHC     (31.0-37.0)  g/dL


 


APTT   66.3 H   (22.0-30.0)  sec


 


Potassium    6.0 H  (3.5-5.1)  mmol/L


 


Carbon Dioxide     (22-30)  mmol/L


 


BUN     (7-17)  mg/dL


 


Creatinine     (0.52-1.04)  mg/dL


 


Glucose     (74-99)  mg/dL


 


POC Glucose (mg/dL)  171 H    (75-99)  mg/dL


 


Hemoglobin A1c     (4.0-6.0)  %


 


Calcium     (8.4-10.2)  mg/dL


 


TSH     (0.465-4.680)  mIU/L


 


Free T4     (0.78-2.19)  ng/dL














  06/18/19 06/18/19 06/18/19 Range/Units





  02:07 05:40 06:00 


 


RBC     (3.80-5.40)  m/uL


 


Hgb     (11.4-16.0)  gm/dL


 


MCV     (80.0-100.0)  fL


 


MCH     (25.0-35.0)  pg


 


MCHC     (31.0-37.0)  g/dL


 


APTT     (22.0-30.0)  sec


 


Potassium     (3.5-5.1)  mmol/L


 


Carbon Dioxide     (22-30)  mmol/L


 


BUN     (7-17)  mg/dL


 


Creatinine     (0.52-1.04)  mg/dL


 


Glucose     (74-99)  mg/dL


 


POC Glucose (mg/dL)  72 L  37 L  182 H  (75-99)  mg/dL


 


Hemoglobin A1c     (4.0-6.0)  %


 


Calcium     (8.4-10.2)  mg/dL


 


TSH     (0.465-4.680)  mIU/L


 


Free T4     (0.78-2.19)  ng/dL














  06/18/19 06/18/19 06/18/19 Range/Units





  06:16 06:16 06:16 


 


RBC   5.81 H   (3.80-5.40)  m/uL


 


Hgb   11.2 L   (11.4-16.0)  gm/dL


 


MCV   65.9 L   (80.0-100.0)  fL


 


MCH   19.3 L   (25.0-35.0)  pg


 


MCHC   29.3 L   (31.0-37.0)  g/dL


 


APTT     (22.0-30.0)  sec


 


Potassium    5.2 H  (3.5-5.1)  mmol/L


 


Carbon Dioxide     (22-30)  mmol/L


 


BUN    51 H  (7-17)  mg/dL


 


Creatinine    1.80 H  (0.52-1.04)  mg/dL


 


Glucose    151 H  (74-99)  mg/dL


 


POC Glucose (mg/dL)     (75-99)  mg/dL


 


Hemoglobin A1c  8.4 H    (4.0-6.0)  %


 


Calcium    10.5 H  (8.4-10.2)  mg/dL


 


TSH     (0.465-4.680)  mIU/L


 


Free T4     (0.78-2.19)  ng/dL














  06/18/19 06/18/19 06/18/19 Range/Units





  06:16 06:16 06:34 


 


RBC     (3.80-5.40)  m/uL


 


Hgb     (11.4-16.0)  gm/dL


 


MCV     (80.0-100.0)  fL


 


MCH     (25.0-35.0)  pg


 


MCHC     (31.0-37.0)  g/dL


 


APTT  67.9 H    (22.0-30.0)  sec


 


Potassium     (3.5-5.1)  mmol/L


 


Carbon Dioxide     (22-30)  mmol/L


 


BUN     (7-17)  mg/dL


 


Creatinine     (0.52-1.04)  mg/dL


 


Glucose     (74-99)  mg/dL


 


POC Glucose (mg/dL)    149 H  (75-99)  mg/dL


 


Hemoglobin A1c     (4.0-6.0)  %


 


Calcium     (8.4-10.2)  mg/dL


 


TSH   <0.015 L   (0.465-4.680)  mIU/L


 


Free T4     (0.78-2.19)  ng/dL














  06/18/19 06/18/19 06/18/19 Range/Units





  11:35 16:47 17:16 


 


RBC     (3.80-5.40)  m/uL


 


Hgb     (11.4-16.0)  gm/dL


 


MCV     (80.0-100.0)  fL


 


MCH     (25.0-35.0)  pg


 


MCHC     (31.0-37.0)  g/dL


 


APTT     (22.0-30.0)  sec


 


Potassium    5.2 H  (3.5-5.1)  mmol/L


 


Carbon Dioxide     (22-30)  mmol/L


 


BUN     (7-17)  mg/dL


 


Creatinine     (0.52-1.04)  mg/dL


 


Glucose     (74-99)  mg/dL


 


POC Glucose (mg/dL)  122 H  147 H   (75-99)  mg/dL


 


Hemoglobin A1c     (4.0-6.0)  %


 


Calcium     (8.4-10.2)  mg/dL


 


TSH     (0.465-4.680)  mIU/L


 


Free T4     (0.78-2.19)  ng/dL








                      Microbiology - Last 24 Hours (Table)











 06/18/19 03:30 Urine Culture - Preliminary





 Urine,Voided 














Assessment and Plan


(1) Hyperthyroidism


Narrative/Plan: 





* TSH .023   FT4 3.43 


*  NM thyroid uptake scan unable to be performed until next week due to the 

  patient taking vitamins and supplements


* Initiated  methimazole 5 mg by mouth daily


Current Visit: Yes   Status: Acute   Code(s): E05.90 - THYROTOXICOSIS, UNSP 

WITHOUT THYROTOXIC CRISIS OR STORM   SNOMED Code(s): 07390145


   





(2) Depression


Narrative/Plan: 





* Patient seen by psychiatry recommending titration up of Zoloft to 75 mg by 

  mouth daily


Current Visit: Yes   Status: Acute   Code(s): F32.9 - MAJOR DEPRESSIVE DISORDER,

SINGLE EPISODE, UNSPECIFIED   SNOMED Code(s): 06326532


   





(3) New onset a-fib


Narrative/Plan: 





* Previously initiated on Cardizem drip, now transitioning to oral beta blocker 

  with metoprolol


* Echocardiogram ejection fraction 55-60%





Current Visit: Yes   Status: Acute   Code(s): I48.91 - UNSPECIFIED ATRIAL 

FIBRILLATION   SNOMED Code(s): 12394556


   





(4) KATHERINE (acute kidney injury)


Narrative/Plan: 





* Secondary to overdiuresis creatinine 1.8 today


* Appreciate nephrology consult recommendations


Current Visit: No   Status: Acute   Code(s): N17.9 - ACUTE KIDNEY FAILURE, 

UNSPECIFIED   SNOMED Code(s): 89669704

## 2019-06-18 NOTE — P.NPCON
History of Present Illness





- Reason for Consult


acute renal failure, chronic renal failure





- History of Present Illness





Reason for consultation: Acute kidney injury on chronic kidney disease





History of present illness:


Patient is a 78-year-old female seen in renal consultation for acute kidney 

injury on chronic kidney disease.  Patient has chronic kidney disease stage III 

with baseline creatinine near 1.5 secondary to diabetic kidney disease.  Patient

presented to the hospital for generalized weakness.  Patient states she is 

unable to live alone at home and feels somewhat depressed.  She wishes to live 

in a senior assisting facility.  She has been voiding.  No hematuria or dysuria.

 Denies use of nonsteroidals.  No vomiting or diarrhea.  Patient's potassium 

level was 6 on admission.  In her home medications abdomen which was taking 

Dyazide, enalapril as well as Aldactone.  All of these have been discontinued.  

Hyperkalemia was medically treated.  It was 5.2 this morning.  She denies chest 

pain or shortness of breath.  No edema.





Vital signs are stable.


General: The patient appeared well nourished and normally developed. 


HEENT: Head exam is unremarkable. Neck is without jugular venous distension.


LUNGS: Lungs are clear to auscultation and percussion. Breath sounds decreased.


HEART: Rate and Rhythm are regular. First and second heart sounds normal. No 

murmurs, rubs or gallops. 


ABDOMEN: Abdominal exam reveals normal bowel sounds. Non-tender and non-

distended. No evidence of peritonitis.


EXTREMITITES: No clubbing, cyanosis, or edema.





Past Medical History


Past Medical History: Diabetes Mellitus, GERD/Reflux, Hyperlipidemia, 

Hypertension, Osteoarthritis (OA)


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 

pancreatitis, esophagitis, gastritis, hiatal hernia.     Other hx:  IDDM type 

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 

UTIs.


History of Any Multi-Drug Resistant Organisms: None Reported


Past Surgical History: Cholecystectomy, Heart Catheterization, Tonsillectomy, 

Tubal Ligation


Additional Past Surgical History / Comment(s): EGD with bx 16,   2010 

cardiac cath-normal, bilateral cataract removal, colonoscopy.


Past Anesthesia/Blood Transfusion Reactions: No Reported Reaction


Additional Past Anesthesia/Blood Transfusion Reaction / Comment(s): Pt has 

clausterphobia.


Past Psychological History: Anxiety, Depression


Additional Psychological History / Comment(s): Pt resides alone.  She is 

independent.


Smoking Status: Former smoker


Past Alcohol Use History: Rare


Additional Past Alcohol Use History / Comment(s): Pt states she started smoking 

in 1956 and quit in .


Past Drug Use History: None Reported





- Past Family History


  ** Father


Family Medical History: Cancer


Additional Family Medical History / Comment(s): Father  at the age of 81 yrs

of lung cancer.  He was a smoker.





  ** Mother


Family Medical History: Cancer


Additional Family Medical History / Comment(s): Mother had cervical cancer.  She

 at the age of 53 from her lupus.





Medications and Allergies


                                Home Medications











 Medication  Instructions  Recorded  Confirmed  Type


 


Simvastatin [Zocor] 40 mg PO DAILY 14 History


 


LORazepam [Ativan] 0.5 mg PO HS PRN 17 History


 


Timolol [Betimol 0.5% Ophth Soln] 1 drop BOTH EYES DAILY 17 

History


 


Triamterene-Hctz 37.5-25Mg 1 cap PO DAILY 17 History





[Dyazide 37.5-25 Capsule]    


 


Cyanocobalamin (Vitamin B-12) 1,000 mcg PO DAILY 19 History





[Vitamin B-12]    


 


Enalapril Maleate [Vasotec] 20 mg PO BID 19 History


 


Gabapentin [Neurontin] 300 mg PO TID 19 History


 


Insulin NPH Hum/Reg Insulin Hm 30 unit SQ HS 19 History





[NovoLIN 70-30 100 UNIT/ML VIAL]    


 


Insulin NPH Hum/Reg Insulin Hm 35 unit SQ QAM 19 History





[NovoLIN 70-30 100 UNIT/ML VIAL]    


 


Magnesium 200 mg PO DAILY 19 History


 


Pantoprazole [Protonix] 40 mg PO BID 19 History


 


Pioglitazone HCl [Actos] 15 mg PO DAILY 19 History


 


Sertraline HCl [Zoloft] 50 mg PO DAILY 19 History


 


Spironolactone 25 mg PO DAILY 19 History








                                    Allergies











Allergy/AdvReac Type Severity Reaction Status Date / Time


 


latex Allergy  Rash/Hives Verified 19 17:06














Physical Exam


Vitals: 


                                   Vital Signs











  Temp Pulse Pulse Resp BP BP Pulse Ox


 


 19 08:30  98.3 F   94  16   101/48  99


 


 19 03:59    87  16   


 


 19 03:36  98.0 F   87  16   137/52  99


 


 19 23:37    103 H  16   


 


 19 23:34  97.9 F   103 H  16   94/58  95


 


 19 21:54  97.4 F L   111 H  18   119/69  98


 


 19 20:30   101 H   10 L  105/52   97


 


 19 20:00   112 H   30 H  103/91   97


 


 19 19:30   131 H   12  134/81  


 


 19 19:00   105 H   17  117/81   91 L


 


 19 18:47   115 H   16  117/81   96


 


 19 17:30   99   37 H  127/91  


 


 19 17:00   98   18  117/82  


 


 19 16:48  98.4 F  94   18  118/100   96


 


 19 16:38     29 H    84 L








                                Intake and Output











 19





 22:59 06:59 14:59


 


Intake Total 3.167 57.155 121.833


 


Output Total  300 


 


Balance 3.167 -242.845 121.833


 


Intake:   


 


  Intake, IV Titration 3.167 57.155 121.833





  Amount   


 


    Diltiazem 125 mg In 3.167  121.833





    Sodium Chloride 0.9% 100   





    ml @ 5 MG/HR 5 mls/hr IV   





    .Q24H ANA Rx#:[id number]   


 


    Heparin Sod,Pork in 0.45%  57.155 





    NaCl 25,000 unit In 0.45   





    % NaCl 1 250ml.bag @ 12   





    UNITS/KG/HR 9.798 mls/hr   





    IV .Q24H ANA Rx#:   





    [id number]   


 


Output:   


 


  Urine  300 


 


Other:   


 


  Voiding Method  Bedside Commode 


 


  # Voids  1 


 


  Weight 81.647 kg 77.4 kg 














Results





- Lab Results


                             Most recent lab results











Calcium  10.5 mg/dL (8.4-10.2)  H  19  06:16    


 


Magnesium  1.7 mg/dL (1.6-2.3)   19  18:38    














                                 19 06:16





                                 19 06:16





Assessment and Plan


Plan: 





Assessment:


1.  Acute kidney injury mostly prerenal secondary to diuretics.  Creatinine 1.8 

today.


2.  Chronic kidney disease stage III secondary to diabetic kidney disease.  

Baseline creatinine near 1.5.


3.  Hyperkalemia secondary to acute kidney injury and further worsened with the 

use of Dyazide, enalapril and Aldactone.  Better.


4.  Metabolic acidosis secondary to acute kidney injury.  Better.


5.  Hypercalcemia.  This can be from Dyazide as well as volume depletion.  

Patient was also taking vitamin D at home which is currently held.


6.  Insulin-dependent diabetes mellitus.


7.  A. fib with RVR.  Status post Cardizem drip.  Maintained on beta blocker and

 anticoagulation.  Cardiology following.


8.  Hyperthyroidism.  Thyroid scan pending.





Plan:


Hold off on diuretics.


Repeat potassium level this evening.


Check serum and urine immunofixation.


Follow-up echocardiogram.





Thank you for the consultation.  I will continue to follow the patient with you 

during her hospital stay.

## 2019-06-18 NOTE — P.CN
Psychiatric Consult





- .


Consult date: 19


Consult:: 





19 15:42


Identification: Patient is a 78-year-old female who presented to the emergency 

room reporting fatigue and being unable to care for her ADLs





Reason for Consult: Depression





History of Present Illness: Patient's chart was reviewed and the patient was 

seen and interviewed in her room no family members were present.  Patient states

that she lives alone with her dog in a 3000 ft. home on 10 acres of land and 

states that over the last 3-4 months she has felt increasingly depressed and 

unable to take care of the home or herself.  She states that she is been 

sleeping too much during the day, her appetite has been poor and her blood 

sugars have been poorly controlled.  She states that she's not been cooking for 

herself has no appetite and has not been caring for her ADLs as well as she was 

in the past.  She states that she's been feeling overwhelmed by caring for the 

house and has not had any energy or had any motivation to take care of things.  

She states that she stopped driving to visit her extended family about a year 

ago because driving 2 hours to visit them was too far.  She states that she 

didn't have her grandsons visit from [place] because she felt that she 

couldn't take care of people visiting her.  She reports that she has lived on 

her own since her 's death in  and moved into her current home in 

 that they had built for his parents who had lived there beginning in . 

She states that she was doing well up until about 4 months ago when she began to

feel overwhelmed by the house, tired and just unable to care for things.  She 

states that she was placed on Zoloft by her primary care physician 1 year ago 

because she felt depressed then but states that she was still taking care of 

things but was beginning to feel more isolated and lonely.  Patient states that 

she has 1 daughter who lives nearby but that her other 2 daughters both live in 

[place].  Her stepdaughter with whom she is also close lives in [place].  

Patient states that she hasn't been driving herself as much, hasn't been getting

dressed if she doesn't need to go out and doesn't care about her personal 

appearance.  She reports no current suicidal thoughts here she states that she 

feels she needs to move into some other living situation.


Patient does not endorse a history of prior depressive episodes, manic episodes,

psychotic symptoms or anxiety symptoms or OCD symptoms.  She has no history of 

prior suicide attempts.  Patient states that she was very active used to drive 

to [place] to visit her brothers, used to fly out of town to visit her 

daughters and stepdaughter in [place] and in [place], would drive to visit 

friends but has done none of this for the last year.





Past Psychiatric History: Patient has no history of inpatient psychiatric 

admissions and was placed on Zoloft by her primary care physician 1 year ago 

otherwise she has no treatment history





Past Medical/Surgical History: Patient has a history of diabetes, GERD, 

hyperlipidemia, hypertension, osteoarthritis and a recent diagnosis of atrial 

fibrillation and elevated TSH.  Patient is status post cholecystectomy and tubal

ligation.





Family History: Patient states that one of her brothers is treated for 

depression, there is no history of substance abuse or alcohol use in the family 

and no completed suicides





Social History: Patient was born and raised in [place] and her parents are both

 she is the oldest of 7 and has 6 brothers.  Patient completed high 

school and attended college for one year.  She was  at the age of 16 and 

this marriage lasted for 5 years and she had her 3 daughters during this 

marriage.  She states she was  at 20 and raise the 3 girls by herself.  

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  

and they have no children together although he had 2 children from her prior 

marriage.  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 

 when she stopped working.  She states that her  built a home for hi

s parents, the one in which she currently is living and they've lived there in 

the  and both  within a year of her 's death in .  They had 

planned to move into this home after he retired, he was diagnosed with lung 

cancer and  within 2 weeks in .  She states that she moved into the 

house and sold their home in [place] in .  Patient lives with her dog, the 

house is all on one floor and she has one close friend.  Patient denies any 

abuse history.





Substance Use History: Patient denies any alcohol or substance abuse history 

currently or in the past





Legal History: Patient denies any legal history





Mental status: Appearance/Attitude: Patient is an a hospital gown, lying in a 

bed in no acute distress and makes good eye contact and is cooperative


Behavior: Patient does not exhibit any psychomotor agitation or retardation.


Speech/Language: Patient's speech is spontaneous and normal volume and rhythm 

and she is coherent


Thought Process: Patient is goal-directed there is no evidence of loose 

association or flight of ideas


Thought Content: Patient denies any auditory or visual hallucinations no 

delusions or paranoid ideation or elicited.  Patient discusses the fact that 

over the last 3-4 months she's been increasingly depressed with a decrease in 

her appetite, sleeping during the day, not cooking for herself having a 

decreased interest in doing things and feeling overwhelmed with caring for her 

home.  Patient states that she's been staying at home has not been driving or 

going out.  Patient states that she has lost interest in caring for her 

activities of daily living.


Suicidal/Homicidal Ideation: Patient denies any current suicidal or homicidal 

ideation


Sensorium/Cognition: Patient is alert and oriented to person, place, and time 

and her recent and remote memory are grossly intact


Mood/Affect: Patient's mood is depressed and her affect is appropriate to her 

mood


Insight/Judgment: Patient's insight and judgment are intact





Assessment: Patient presents with an onset of depression over the last year that

has increased in the last 3-4 months where the patient has been feeling 

increasingly tired, with a lack of energy or interest to do things and also 

decrease in her appetite and feeling overwhelmed by caring for her ADLs.  

Patient states that she's not been cooking for herself not been able to clean as

well and not cared about her personal appearance.  She states that she's been 

feeling tired, has stopped driving to visit extended family members and has been

feeling increasingly lonely.  Patient has no history of prior treatment for dep

ression, does not endorse any manic symptoms, anxiety symptoms or psychotic 

symptoms.  Patient on this admission was discovered to have a decreased TSH with

possible hyperthyroidism and atrial fibrillation.  Patient was started on Zoloft

50 mg by her primary care physician a year ago.  Patient discussed her plans to 

sell her home and move into a senior living facility or into assisted living.





Diagnosis: Major depressive disorder, single episode, moderate severity





Plan: Patient does not require inpatient admission, I discussed with the patient

increasing her Zoloft to 75 mg daily to see if this would assist in treating her

depressive symptoms.  Is unclear to me if some of her physical complaints may be

due to her new onset of atrial fibrillation and/or possible hyperthyroidism.  

Patient has become increasingly overwhelmed over the last 3-4 months and has not

been able to care for her ADLs as well as she was in the past as well as not 

controlling her blood sugar as well and states that she's not been eating or 

cooking for herself as she did in the past and has been more withdrawn.  Patient

and I discussed her plans to go to a rehab facility after release from the 

hospital and then to consider moving into a senior living apartment or an 

assisted living her daughter who lives locally is assisting the patient and is 

planning the patient is agreeable with this.  She discussed selling her home.  

Patient should continue on the Zoloft 75 mg while she is here in the hospital 

and when she is released from the hospital he can continue to be adjusted if so 

needed.  I will sign off the case please contact me if there are any further 

questions or concerns.  I spoke with social work regarding this case the fact 

that the patient is agreeable with the above plan.

## 2019-06-18 NOTE — ECHOF
Referral Reason:Afib



MEASUREMENTS

--------

HEIGHT: 157.5 cm

WEIGHT: 77.1 kg

BP: 137/52

IVSd:   0.9 cm     (0.6 - 1.1)

LVIDd:   4.3 cm     (3.9 - 5.3)

LVPWd:   0.9 cm     (0.6 - 1.1)

IVSs:   1.2 cm

LVIDs:   2.7 cm

LVPWs:   1.2 cm

Ao Diam:   3.2 cm     (2.0 - 3.7)

AV Cusp:   3.2 cm     (1.5 - 2.6)

LA Diam:   1.8 cm     (2.7 - 3.8)

MV EXCURSION:   21.866 mm     (> 18.000)

MV EF SLOPE:   105 mm/s     (70 - 150)

EPSS:   0.5 cm







FINDINGS

--------

Atrial fibrillation.

This was a technically adequate study.

The left ventricular size is normal.   There is normal global left ventricular contractility.   Overa
ll left ventricular systolic function is normal with, an EF between 55 - 60 %.

The right ventricle is normal in size.

The left atrium is normal in size.

The right atrial size is normal.

There is mild aortic valve sclerosis.

Mild mitral annular calcification present.   Mild mitral regurgitation is present.

The tricuspid valve appears structurally normal.   Mild tricuspid regurgitation present.   There is n
o evidence of pulmonary hypertension.   The right ventricular systolic pressure, as measured by Doppl
er, is {RVSP}.

There is no pulmonic regurgitation present.

Echo free space may represent effusion or a pericardial fat pad.



CONCLUSIONS

--------

1. Atrial fibrillation.

2. This was a technically adequate study.

3. The left ventricular size is normal.

4. There is normal global left ventricular contractility.

5. Overall left ventricular systolic function is normal with, an EF between 55 - 60 %.

6. The left atrium is normal in size.

7. There is mild aortic valve sclerosis.

8. Mild mitral annular calcification present.

9. Mild mitral regurgitation is present.

10. The tricuspid valve appears structurally normal.

11. Mild tricuspid regurgitation present.

12. There is no evidence of pulmonary hypertension.

13. There is no pulmonic regurgitation present.

14. Echo free space may represent effusion or a pericardial fat pad.





SONOGRAPHER: JOSE GarciaT

## 2019-06-18 NOTE — P.CRDCN
History of Present Illness


Consult date: 19


Requesting physician: Donny Lindsey


Consult reason: atrial fibrillation


Chief complaint: Fatigue


History of present illness: 


This is a 78-year-old  female with history of hypertension, diabetes, 

hyperlipidemia, prior history of smoking, rare EtOH, who presents to the 

hospital with symptoms of fatigue and inability to care for herself at home.  

According to the patient, over the past couple of months she's noticed herself 

to be extremely lonely, she is having a hard time walking, feels as though she 

is in able to take care of herself.  She primarily came to the hospital for the

se reasons in hopes to find placement.  An EKG was performed on arrival here 

which showed atrial fibrillation for this reason a cardiology consultation has 

been requested.  According to the patient, she denies having any history of 

irregular heartbeat in the past.  She does state though that she's been having 

low blood sugars occasionally at home.  Chest x-ray on arrival here showed 

minimal scarring or subsegmental atelectasis in the left lower lobe.  EKG showed

atrial fibrillation with a controlled ventricular response.  Blood pressure 

130/50 heart rate in the 80s to 90s, afebrile, 99% on room air.  White blood 

cell count 12.0 on admission, 10.1 this morning, hemoglobin 11.2, platelet count

337.  Sodium 137, potassium 6.0, BUN 56 and creatinine 1.7 on admission, this 

morning sodium 139, potassium 6.0, BUN 51 and creatinine 1.8.  TSH level 0.023, 

free T4 3.43, troponin 0.014.  At the time of her examination this morning, 

patient is quite talkative, she did get teary at the end of our conversation, 

she is still quite depressed and really wants to get some help regarding her 

home situation.








Past Medical History


Past Medical History: Diabetes Mellitus, GERD/Reflux, Hyperlipidemia, 

Hypertension, Osteoarthritis (OA)


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 

pancreatitis, esophagitis, gastritis, hiatal hernia.     Other hx:  IDDM type 

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 

UTIs.


History of Any Multi-Drug Resistant Organisms: None Reported


Past Surgical History: Cholecystectomy, Heart Catheterization, Tonsillectomy, 

Tubal Ligation


Additional Past Surgical History / Comment(s): EGD with bx 17,   2010 

cardiac cath-normal, bilateral cataract removal, colonoscopy.


Past Anesthesia/Blood Transfusion Reactions: No Reported Reaction


Additional Past Anesthesia/Blood Transfusion Reaction / Comment(s): Pt has 

clausterphobia.


Past Psychological History: Anxiety, Depression


Additional Psychological History / Comment(s): Pt resides alone.  She is 

independent.


Smoking Status: Former smoker


Past Alcohol Use History: Rare


Additional Past Alcohol Use History / Comment(s): Pt states she started smoking 

in  and quit in .


Past Drug Use History: None Reported





- Past Family History


  ** Father


Family Medical History: Cancer


Additional Family Medical History / Comment(s): Father  at the age of 81 yrs

of lung cancer.  He was a smoker.





  ** Mother


Family Medical History: Cancer


Additional Family Medical History / Comment(s): Mother had cervical cancer.  She

 at the age of 53 from her lupus.





Medications and Allergies


                                Home Medications











 Medication  Instructions  Recorded  Confirmed  Type


 


Simvastatin [Zocor] 40 mg PO DAILY 14 History


 


LORazepam [Ativan] 0.5 mg PO HS PRN 17 History


 


Timolol [Betimol 0.5% Ophth Soln] 1 drop BOTH EYES DAILY 17 

History


 


Triamterene-Hctz 37.5-25Mg 1 cap PO DAILY 17 History





[Dyazide 37.5-25 Capsule]    


 


Cyanocobalamin (Vitamin B-12) 1,000 mcg PO DAILY 19 History





[Vitamin B-12]    


 


Enalapril Maleate [Vasotec] 20 mg PO BID 19 History


 


Gabapentin [Neurontin] 300 mg PO TID 19 History


 


Insulin NPH Hum/Reg Insulin Hm 30 unit SQ HS 19 History





[NovoLIN 70-30 100 UNIT/ML VIAL]    


 


Insulin NPH Hum/Reg Insulin Hm 35 unit SQ QAM 19 History





[NovoLIN 70-30 100 UNIT/ML VIAL]    


 


Magnesium 200 mg PO DAILY 19 History


 


Pantoprazole [Protonix] 40 mg PO BID 19 History


 


Pioglitazone HCl [Actos] 15 mg PO DAILY 19 History


 


Sertraline HCl [Zoloft] 50 mg PO DAILY 19 History


 


Spironolactone 25 mg PO DAILY 19 History








                                    Allergies











Allergy/AdvReac Type Severity Reaction Status Date / Time


 


latex Allergy  Rash/Hives Verified 19 17:06














Physical Exam


Vitals: 


                                   Vital Signs











  Temp Pulse Pulse Resp BP BP Pulse Ox


 


 19 08:30  98.3 F   94  16   101/48  99


 


 19 03:59    87  16   


 


 19 03:36  98.0 F   87  16   137/52  99


 


 19 23:37    103 H  16   


 


 19 23:34  97.9 F   103 H  16   94/58  95


 


 19 21:54  97.4 F L   111 H  18   119/69  98


 


 19 20:30   101 H   10 L  105/52   97


 


 19 20:00   112 H   30 H  103/91   97


 


 19 19:30   131 H   12  134/81  


 


 19 19:00   105 H   17  117/81   91 L


 


 19 18:47   115 H   16  117/81   96


 


 19 17:30   99   37 H  127/91  


 


 19 17:00   98   18  117/82  


 


 19 16:48  98.4 F  94   18  118/100   96


 


 19 16:38     29 H    84 L








                                Intake and Output











 19





 22:59 06:59 14:59


 


Intake Total 3.167 57.155 


 


Output Total  300 


 


Balance 3.167 -242.845 


 


Intake:   


 


  Intake, IV Titration 3.167 57.155 





  Amount   


 


    Diltiazem 125 mg In 3.167  





    Sodium Chloride 0.9% 100   





    ml @ 5 MG/HR 5 mls/hr IV   





    .Q24H ANA Rx#:[id number]   


 


    Heparin Sod,Pork in 0.45%  57.155 





    NaCl 25,000 unit In 0.45   





    % NaCl 1 250ml.bag @ 12   





    UNITS/KG/HR 9.798 mls/hr   





    IV .Q24H ANA Rx#:   





    [id number]   


 


Output:   


 


  Urine  300 


 


Other:   


 


  Voiding Method  Bedside Commode 


 


  # Voids  1 


 


  Weight 81.647 kg 77.4 kg 














PHYSICAL EXAMINATION: 





GENERAL: 78-year-old  female in no acute distress at the time of my 

examination





HEENT: Head is atraumatic, normocephalic.  Pupils equal, round.  Sclera 

anicteric. Conjunctiva are clear.  Mucous membranes of the mouth are moist.  

Neck is supple.  There is no elevated jugular venous pressure.No carotid  bruit 

is heard.





HEART EXAMINATION: Heart S1, S2 irregularly irregular, systolic murmur is heard.





CHEST EXAMINATION: Lungs are clear to auscultation and precussion. No chest wall

 tenderness is noted on palpation or with deep breathing.





ABDOMEN:  Soft, nontender. Bowel sounds are heard. No organomegaly noted.


 


EXTREMITIES: 2+ peripheral pulses with no evidence of peripheral edema and no 

calf tenderness noted.





NEUROLOGIC patient is awake, alert and oriented 3 .


 


.


 








Results





                                 19 06:16





                                 19 06:16


                                 Cardiac Enzymes











  19 Range/Units





  17:40 17:40 18:38 


 


AST  Not Reportable   24  


 


Troponin I   0.014   (0.000-0.034)  ng/mL














  19 Range/Units





  06:16 


 


AST  31  


 


Troponin I   (0.000-0.034)  ng/mL








                                   Coagulation











  1918 Range/Units





  17:40 00:50 06:16 


 


PT  11.2    (9.0-12.0)  sec


 


APTT  23.7  66.3 H  67.9 H  (22.0-30.0)  sec








                                       CBC











  19 Range/Units





  17:40 06:16 


 


WBC  12.0 H  10.1  (3.8-10.6)  k/uL


 


RBC  6.18 H  5.81 H  (3.80-5.40)  m/uL


 


Hgb  12.2  11.2 L  (11.4-16.0)  gm/dL


 


Hct  40.1  38.3  (34.0-46.0)  %


 


Plt Count  336  337  (150-450)  k/uL








                          Comprehensive Metabolic Panel











  1918 Range/Units





  17:40 18:38 00:50 


 


Sodium  Not Reportable  137   


 


Potassium  Not Reportable  6.0 H  6.0 H  


 


Chloride  Not Reportable  106   


 


Carbon Dioxide  Not Reportable  19 L   


 


BUN  Not Reportable  56 H   


 


Creatinine  Not Reportable  1.74 H   


 


Glucose  Not Reportable  75   


 


Calcium  Not Reportable  9.7   


 


AST  Not Reportable  24   


 


ALT  Not Reportable  15   


 


Alkaline Phosphatase  Not Reportable  121   


 


Total Protein  Not Reportable  7.2   


 


Albumin  Not Reportable  3.9   














  19 Range/Units





  06:16 


 


Sodium  139  


 


Potassium  5.2 H  


 


Chloride  105  


 


Carbon Dioxide  23  


 


BUN  51 H  


 


Creatinine  1.80 H  


 


Glucose  151 H  


 


Calcium  10.5 H  


 


AST  31  


 


ALT  22  


 


Alkaline Phosphatase  111  


 


Total Protein  6.8  


 


Albumin  3.6  








                               Current Medications











Generic Name Dose Route Start Last Admin





  Trade Name Freq  PRN Reason Stop Dose Admin


 


Atorvastatin Calcium  20 mg  19 09:00 





  Lipitor  PO  





  DAILY ANA  


 


Gabapentin  300 mg  19 22:00  19 22:31





  Neurontin  PO   300 mg





  TID ANA   Administration


 


Heparin Sodium (Porcine)  0 unit  19 18:59 





  Heparin  IV  





  PER PROTOCOL PRN  





  Low PTT  





  Protocol  


 


Heparin Sodium/Sodium Chloride  250 mls @ 9.798 mls/hr  19 19:00  19

 01:51





  25,000 unit/ Sodium Chloride  IV   12 units/kg/hr





  .Q24H ANA   9.798 mls/hr





    Titration





  Protocol  





  12 UNITS/KG/HR  


 


Diltiazem HCl 125 mg/ Sodium  125 mls @ 5 mls/hr  19 19:45  19 20:52





  Chloride  IV   10 mg/hr





  .Q24H ANA   10 mls/hr





    Infusion





  5 MG/HR  


 


Sodium Chloride  1,000 mls @ 20 mls/hr  19 19:45  19 19:57





  Saline 0.9%  IV   20 mls/hr





  .Q24H ANA   Administration


 


Insulin Aspart  0 unit  19 21:00  19 06:12





  Novolog  SQ   Not Given





  [facility]  





  Protocol  


 


Lorazepam  0.5 mg  19 19:32  19 22:59





  Ativan  PO   0.5 mg





  HS PRN   Administration





  Anxiety  


 


Metoprolol Tartrate  50 mg  19 09:00 





  Lopressor  PO  





  BID ANA  


 


Naloxone HCl  0.2 mg  19 19:33 





  Narcan  IV  





  Q2M PRN  





  Opioid Reversal  


 


Pantoprazole Sodium  40 mg  19 21:00  19 06:26





  Protonix  PO   40 mg





  AC-BID ANA   Administration


 


Sertraline HCl  50 mg  19 09:00 





  Zoloft  PO  





  DAILY ANA  


 


Timolol Maleate  1 drops  19 09:00 





  Timoptic  BOTH EYES  





  DAILY ANA  








                                Intake and Output











 19





 22:59 06:59 14:59


 


Intake Total 3.167 57.155 


 


Output Total  300 


 


Balance 3.167 -242.845 


 


Intake:   


 


  Intake, IV Titration 3.167 57.155 





  Amount   


 


    Diltiazem 125 mg In 3.167  





    Sodium Chloride 0.9% 100   





    ml @ 5 MG/HR 5 mls/hr IV   





    .Q24H ANA Rx#:[id number]   


 


    Heparin Sod,Pork in 0.45%  57.155 





    NaCl 25,000 unit In 0.45   





    % NaCl 1 250ml.bag @ 12   





    UNITS/KG/HR 9.798 mls/hr   





    IV .Q24H ANA Rx#:   





    [id number]   


 


Output:   


 


  Urine  300 


 


Other:   


 


  Voiding Method  Bedside Commode 


 


  # Voids  1 


 


  Weight 81.647 kg 77.4 kg 








                                        





                                 19 06:16 





                                 19 06:16 











EKG Interpretations (text)





EKG shows atrial fibrillation with a fairly controlled ventricular response





Assessment and Plan


Plan: 


Assessment and plan


#1 atrial fibrillation with controlled ventricular response, appears to be of 

new onset for the patient


#2 hypertension


#3 diabetes


#4 hyperlipidemia


#5 depression


#6 hyperthyroidism, TSH 0.023.  Free T4 3.43


#7 hyperkalemia


#8 acute on chronic renal insufficiency








Plan


We will obtain an echocardiogram with Doppler study, patient was on Vasotec and 

Aldactone at home which are currently on hold because of the hyperkalemia and 

abnormal renal function.  We will discontinue the IV Cardizem and start the 

patient on Eliquis checking to see regarding coverage.  Continue metoprolol 50 

mg one tablet by mouth twice a day.





DNP note has been reviewed, I agree with a documented findings and plan of care.

  Patient was seen and examined.

## 2019-06-19 NOTE — P.DS
Providers


Date of admission: 


06/17/19 20:12





Expected date of discharge: 06/19/19


Attending physician: 


Donny Lindsey MD





Consults: 





                                        





06/17/19 19:34


Consult Physician Routine 


   Consulting Provider: [facility]


   Consult Reason/Comments: New onset afib


   Do you want consulting provider notified?: Yes





06/17/19 19:35


Consult Physician Routine 


   Consulting Provider: Jeff Pike


   Consult Reason/Comments: Depression


   Do you want consulting provider notified?: Yes











Primary care physician: 


Mckay Martinez








- Discharge Diagnosis(es)


(1) New onset a-fib


Current Visit: Yes   Status: Acute   





(2) Hyperthyroidism


Current Visit: Yes   Status: Acute   





(3) KATHERINE (acute kidney injury)


Current Visit: No   Status: Acute   





(4) Depression


Current Visit: Yes   Status: Acute   





(5) Hyperkalemia


Current Visit: Yes   Status: Acute   





(6) Hypomagnesemia


Current Visit: Yes   Status: Acute   


Hospital Course: 





The patient is a 77 yo F with a PMH of DM and HTN  who was admitted for new 

onset A. fib with RVR after presenting with chief complaint of increasing 

fatigue and weakness.  She was initiated on Cardizem drip and started on 

Lopressor along with heparin infusion for anticoagulation.  Echocardiogram 

showed ejection fraction of 55-60% the patient was noted to have hyperthyroidism

as her TSH was 0.0-3 and free T4 was 3.43. she was started on methimazole, 

nuclear medicine uptake scan unable to be performed secondary to patient being 

on vitamins, thyroid ultrasound showed heterogenous thyroid with no discrete or 

dominant masses seen patient was transitioned to Elquis for anticoagulation. . 

The patient is also noted to have mild acute kidney injury with hyperkalemia is 

secondary to overdiuresis. Dyazide lisinopril and  spironolactone were held, she

was given Kayexalate to correct her hyperkalemia and her magnesium was also 

replaced.  The patient was seen by psychiatry   Due to her worsening depression 

and her home dose of Zoloft was titrated up to 75 mg  PO daily.  The patient 

subsequently discharged home in stable condition with home health services for 

physical therapy and instructed to follow-up with her PCP Dr. Martinez  6/25/19 at

2 PM.  This discharge process took approximately 35 minutes 








focus exam


Cardiovascular: Regular rate and rhythm no murmurs or gallops


Patient Condition at Discharge: Good





Plan - Discharge Summary


Discharge Rx Participant: No


New Discharge Prescriptions: 


New


   Apixaban [Eliquis] 5 mg PO BID #60 tab


   Metoprolol Tartrate [Lopressor] 50 mg PO BID #60 tab


   Methimazole [Tapazole] 5 mg PO DAILY #30 tab


   Sertraline [Zoloft] 75 mg PO DAILY #30 tab





Continue


   Simvastatin [Zocor] 40 mg PO DAILY


   LORazepam [Ativan] 0.5 mg PO HS PRN


     PRN Reason: Anxiety


   Timolol [Betimol 0.5% Ophth Soln] 1 drop BOTH EYES DAILY


   Insulin NPH Hum/Reg Insulin Hm [NovoLIN 70-30 100 UNIT/ML VIAL] 35 unit SQ 

QAM


   Magnesium 200 mg PO DAILY


   Cyanocobalamin (Vitamin B-12) [Vitamin B-12] 1,000 mcg PO DAILY


   Pioglitazone HCl [Actos] 15 mg PO DAILY


   Pantoprazole [Protonix] 40 mg PO BID


   Sertraline HCl [Zoloft] 50 mg PO DAILY


   Gabapentin [Neurontin] 300 mg PO TID


   Insulin NPH Hum/Reg Insulin Hm [NovoLIN 70-30 100 UNIT/ML VIAL] 30 unit SQ HS





Discontinued


   Triamterene-Hctz 37.5-25Mg [Dyazide 37.5-25 Capsule] 1 cap PO DAILY


   Enalapril Maleate [Vasotec] 20 mg PO BID


   Spironolactone 25 mg PO DAILY


Discharge Medication List





Simvastatin [Zocor] 40 mg PO DAILY 05/30/14 [History]


LORazepam [Ativan] 0.5 mg PO HS PRN 02/21/17 [History]


Timolol [Betimol 0.5% Ophth Soln] 1 drop BOTH EYES DAILY 03/13/17 [History]


Cyanocobalamin (Vitamin B-12) [Vitamin B-12] 1,000 mcg PO DAILY 06/17/19 

[History]


Gabapentin [Neurontin] 300 mg PO TID 06/17/19 [History]


Insulin NPH Hum/Reg Insulin Hm [NovoLIN 70-30 100 UNIT/ML VIAL] 30 unit SQ HS 

06/17/19 [History]


Insulin NPH Hum/Reg Insulin Hm [NovoLIN 70-30 100 UNIT/ML VIAL] 35 unit SQ QAM 

06/17/19 [History]


Magnesium 200 mg PO DAILY 06/17/19 [History]


Pantoprazole [Protonix] 40 mg PO BID 06/17/19 [History]


Pioglitazone HCl [Actos] 15 mg PO DAILY 06/17/19 [History]


Sertraline HCl [Zoloft] 50 mg PO DAILY 06/17/19 [History]


Apixaban [Eliquis] 5 mg PO BID #60 tab 06/19/19 [Rx]


Methimazole [Tapazole] 5 mg PO DAILY #30 tab 06/19/19 [Rx]


Metoprolol Tartrate [Lopressor] 50 mg PO BID #60 tab 06/19/19 [Rx]


Sertraline [Zoloft] 75 mg PO DAILY #30 tab 06/19/19 [Rx]








Follow up Appointment(s)/Referral(s): 


[facility] [Provider Group] - 1 Week


[facility], [NON-STAFF] - 


[facility], [NON-STAFF] - As Needed


Mckay Martinez MD [Primary Care Provider] - 06/24/19 2:00 pm


Patient Instructions/Handouts:  A-fib (Atrial Fibrillation) (DC), Heart Healthy 

Diet (DC), Depression (DC), Safe Use of Anticoagulants (DC)


Activity/Diet/Wound Care/Special Instructions: 


1.  Eliquis copay is $47/month. Physical prescription is in the front of the 

patient's chart. 


2.  Pt will need Nuclear Med test thyroid image with uptake-reason hyper

thyroidism. Please stay off Magnesium & B12 supplements along with avoiding all 

vitamin/mineral supplements, kelp, seaweed, fish, shellfish, cabbage & turnips 

for one week prior to test. Test can be scheduled starting on Monday the 24th. 

Thyroid prep sheet placed in chart.


Discharge Disposition: HOME WITH HOME HEALTH SERVICES

## 2019-06-19 NOTE — P.PN
Subjective


Progress Note Date: 06/19/19





This is a 78-year-old  female with history of hypertension, diabetes, 

hyperlipidemia, prior history of smoking, rare EtOH, who presents to the 

hospital with symptoms of fatigue and inability to care for herself at home.  

According to the patient, over the past couple of months she's noticed herself 

to be extremely lonely, she is having a hard time walking, feels as though she 

is in able to take care of herself.  She primarily came to the hospital for 

these reasons in hopes to find placement.  An EKG was performed on arrival here 

which showed atrial fibrillation for this reason a cardiology consultation has 

been requested.  According to the patient, she denies having any history of 

irregular heartbeat in the past.  She does state though that she's been having 

low blood sugars occasionally at home.  Chest x-ray on arrival here showed 

minimal scarring or subsegmental atelectasis in the left lower lobe.  EKG showed

atrial fibrillation with a controlled ventricular response.  Blood pressure 

130/50 heart rate in the 80s to 90s, afebrile, 99% on room air.  White blood 

cell count 12.0 on admission, 10.1 this morning, hemoglobin 11.2, platelet count

337.  Sodium 137, potassium 6.0, BUN 56 and creatinine 1.7 on admission, this 

morning sodium 139, potassium 6.0, BUN 51 and creatinine 1.8.  TSH level 0.023, 

free T4 3.43, troponin 0.014.  At the time of her examination this morning, alexsander

ent is quite talkative, she did get teary at the end of our conversation, she is

still quite depressed and really wants to get some help regarding her home 

situation.








06/19/2019


Patient was seen and examined this morning, continues to be in atrial 

fibrillation with controlled ventricular response.  She is quite eager today to 

be discharged home.  Blood pressure 120/60 with a heart rate in the 60s today.  

Echocardiogram with Doppler study was performed which revealed an ejection 

fraction of 55-60%.  Patient is currently on Eliquis for anticoagulation.





Objective





- Vital Signs


Vital signs: 


                                   Vital Signs











Temp  97.5 F L  06/19/19 11:55


 


Pulse  62   06/19/19 11:55


 


Resp  18   06/19/19 11:55


 


BP  120/57   06/19/19 11:55


 


Pulse Ox  99   06/19/19 11:55








                                 Intake & Output











 06/18/19 06/19/19 06/19/19





 18:59 06:59 18:59


 


Intake Total 361.833 100 360


 


Output Total 400  


 


Balance -38.167 100 360


 


Intake:   


 


  Intake, IV Titration 121.833  





  Amount   


 


    Diltiazem 125 mg In 121.833  





    Sodium Chloride 0.9% 100   





    ml @ 5 MG/HR 5 mls/hr IV   





    .Q24H ANA Rx#:[id number]   


 


  Oral 240 100 360


 


Output:   


 


  Urine 400  


 


Other:   


 


  Voiding Method Bedside Commode Bedside Commode Bedside Commode


 


  # Voids  1 1














- Exam





PHYSICAL EXAMINATION: 





GENERAL: 78-year-old  female in no acute distress at the time of my 

examination





HEENT: Head is atraumatic, normocephalic.  Pupils equal, round.  Sclera 

anicteric. Conjunctiva are clear.  Mucous membranes of the mouth are moist.  

Neck is supple.  There is no elevated jugular venous pressure.No carotid  bruit 

is heard.





HEART EXAMINATION: Heart S1, S2 irregularly irregular, systolic murmur is heard.





CHEST EXAMINATION: Lungs are clear to auscultation and precussion. No chest wall

tenderness is noted on palpation or with deep breathing.





ABDOMEN:  Soft, nontender. Bowel sounds are heard. No organomegaly noted.


 


EXTREMITIES: 2+ peripheral pulses with no evidence of peripheral edema and no 

calf tenderness noted.





NEUROLOGIC patient is awake, alert and oriented 3 .


 





- Labs


CBC & Chem 7: 


                                 06/19/19 06:17





                                 06/19/19 06:17


Labs: 


                  Abnormal Lab Results - Last 24 Hours (Table)











  06/18/19 06/18/19 06/18/19 Range/Units





  16:47 17:16 21:16 


 


Hgb     (11.4-16.0)  gm/dL


 


Hct     (34.0-46.0)  %


 


MCV     (80.0-100.0)  fL


 


MCH     (25.0-35.0)  pg


 


MCHC     (31.0-37.0)  g/dL


 


Potassium   5.2 H   (3.5-5.1)  mmol/L


 


Carbon Dioxide     (22-30)  mmol/L


 


BUN     (7-17)  mg/dL


 


Creatinine     (0.52-1.04)  mg/dL


 


Glucose     (74-99)  mg/dL


 


POC Glucose (mg/dL)  147 H   182 H  (75-99)  mg/dL


 


Magnesium     (1.6-2.3)  mg/dL














  06/19/19 06/19/19 06/19/19 Range/Units





  06:17 06:17 06:27 


 


Hgb  10.0 L    (11.4-16.0)  gm/dL


 


Hct  32.9 L    (34.0-46.0)  %


 


MCV  65.7 L    (80.0-100.0)  fL


 


MCH  19.9 L    (25.0-35.0)  pg


 


MCHC  30.3 L    (31.0-37.0)  g/dL


 


Potassium     (3.5-5.1)  mmol/L


 


Carbon Dioxide   21 L   (22-30)  mmol/L


 


BUN   55 H   (7-17)  mg/dL


 


Creatinine   1.86 H   (0.52-1.04)  mg/dL


 


Glucose   103 H   (74-99)  mg/dL


 


POC Glucose (mg/dL)    113 H  (75-99)  mg/dL


 


Magnesium   1.4 L   (1.6-2.3)  mg/dL














  06/19/19 Range/Units





  11:37 


 


Hgb   (11.4-16.0)  gm/dL


 


Hct   (34.0-46.0)  %


 


MCV   (80.0-100.0)  fL


 


MCH   (25.0-35.0)  pg


 


MCHC   (31.0-37.0)  g/dL


 


Potassium   (3.5-5.1)  mmol/L


 


Carbon Dioxide   (22-30)  mmol/L


 


BUN   (7-17)  mg/dL


 


Creatinine   (0.52-1.04)  mg/dL


 


Glucose   (74-99)  mg/dL


 


POC Glucose (mg/dL)  153 H  (75-99)  mg/dL


 


Magnesium   (1.6-2.3)  mg/dL








                      Microbiology - Last 24 Hours (Table)











 06/18/19 03:30 Urine Culture - Final





 Urine,Voided 














Assessment and Plan


Plan: 


Assessment and plan


#1 atrial fibrillation with controlled ventricular response, appears to be of 

new onset for the patient


#2 hypertension


#3 diabetes


#4 hyperlipidemia


#5 depression


#6 hyperthyroidism, TSH 0.023.  Free T4 3.43


#7 hyperkalemia


#8 acute on chronic renal insufficiency








Plan from cardiology's perspective, patient may be able to be discharged home 

today.  We'll make her a follow-up appointment in the office post discharge.





DNP note has been reviewed, I agree with a documented findings and plan of care.

 Patient was seen and examined.

## 2019-06-19 NOTE — P.PN
Subjective





Patient is seen in follow-up for acute kidney injury on chronic kidney disease. 

Patient has chronic kidney disease stage III.  Baseline creatinine near 1.5 

secondary to diabetic kidney disease.  Renal function is relatively stable 

today.  Creatinine 1.86.  Oral intake is good.  Good urine output.  No vomiting 

or diarrhea.  Heart rate is controlled.  She is on Lopressor as well as 

anticoagulation.





Vital signs are stable.


General: The patient appeared well nourished and normally developed. 


HEENT: Head exam is unremarkable. Neck is without jugular venous distension.


LUNGS: Lungs are clear to auscultation and percussion. Breath sounds decreased.


HEART: Rate and Rhythm are regular. First and second heart sounds normal. No 

murmurs, rubs or gallops. 


ABDOMEN: Abdominal exam reveals normal bowel sounds. Non-tender and non-

distended. No evidence of peritonitis.


EXTREMITITES: No clubbing, cyanosis, or edema.





Objective





- Vital Signs


Vital signs: 


                                   Vital Signs











Temp  98.0 F   06/19/19 03:36


 


Pulse  60   06/19/19 08:00


 


Resp  18   06/19/19 08:00


 


BP  144/65   06/19/19 08:00


 


Pulse Ox  98   06/19/19 08:00








                                 Intake & Output











 06/18/19 06/19/19 06/19/19





 18:59 06:59 18:59


 


Intake Total 361.833 100 


 


Output Total 400  


 


Balance -38.167 100 


 


Intake:   


 


  Intake, IV Titration 121.833  





  Amount   


 


    Diltiazem 125 mg In 121.833  





    Sodium Chloride 0.9% 100   





    ml @ 5 MG/HR 5 mls/hr IV   





    .Q24H [facility] Rx#:[id number]   


 


  Oral 240 100 


 


Output:   


 


  Urine 400  


 


Other:   


 


  Voiding Method Bedside Commode Bedside Commode Bedside Commode


 


  # Voids  1 1














- Labs


CBC & Chem 7: 


                                 06/19/19 06:17





                                 06/19/19 06:17


Labs: 


                  Abnormal Lab Results - Last 24 Hours (Table)











  06/18/19 06/18/19 06/18/19 Range/Units





  06:16 06:16 11:35 


 


Hgb     (11.4-16.0)  gm/dL


 


Hct     (34.0-46.0)  %


 


MCV     (80.0-100.0)  fL


 


MCH     (25.0-35.0)  pg


 


MCHC     (31.0-37.0)  g/dL


 


Potassium     (3.5-5.1)  mmol/L


 


Carbon Dioxide     (22-30)  mmol/L


 


BUN     (7-17)  mg/dL


 


Creatinine     (0.52-1.04)  mg/dL


 


Glucose     (74-99)  mg/dL


 


POC Glucose (mg/dL)    122 H  (75-99)  mg/dL


 


Hemoglobin A1c  8.4 H    (4.0-6.0)  %


 


Magnesium     (1.6-2.3)  mg/dL


 


TSH   <0.015 L   (0.465-4.680)  mIU/L














  06/18/19 06/18/19 06/18/19 Range/Units





  16:47 17:16 21:16 


 


Hgb     (11.4-16.0)  gm/dL


 


Hct     (34.0-46.0)  %


 


MCV     (80.0-100.0)  fL


 


MCH     (25.0-35.0)  pg


 


MCHC     (31.0-37.0)  g/dL


 


Potassium   5.2 H   (3.5-5.1)  mmol/L


 


Carbon Dioxide     (22-30)  mmol/L


 


BUN     (7-17)  mg/dL


 


Creatinine     (0.52-1.04)  mg/dL


 


Glucose     (74-99)  mg/dL


 


POC Glucose (mg/dL)  147 H   182 H  (75-99)  mg/dL


 


Hemoglobin A1c     (4.0-6.0)  %


 


Magnesium     (1.6-2.3)  mg/dL


 


TSH     (0.465-4.680)  mIU/L














  06/19/19 06/19/19 06/19/19 Range/Units





  06:17 06:17 06:27 


 


Hgb  10.0 L    (11.4-16.0)  gm/dL


 


Hct  32.9 L    (34.0-46.0)  %


 


MCV  65.7 L    (80.0-100.0)  fL


 


MCH  19.9 L    (25.0-35.0)  pg


 


MCHC  30.3 L    (31.0-37.0)  g/dL


 


Potassium     (3.5-5.1)  mmol/L


 


Carbon Dioxide   21 L   (22-30)  mmol/L


 


BUN   55 H   (7-17)  mg/dL


 


Creatinine   1.86 H   (0.52-1.04)  mg/dL


 


Glucose   103 H   (74-99)  mg/dL


 


POC Glucose (mg/dL)    113 H  (75-99)  mg/dL


 


Hemoglobin A1c     (4.0-6.0)  %


 


Magnesium   1.4 L   (1.6-2.3)  mg/dL


 


TSH     (0.465-4.680)  mIU/L








                      Microbiology - Last 24 Hours (Table)











 06/18/19 03:30 Urine Culture - Preliminary





 Urine,Voided 














Assessment and Plan


Plan: 





Assessment:


1.  Acute kidney injury mostly prerenal secondary to diuretics and hemodynamic 

instability.  Creatinine 1.86 today.  No proteinuria on UA.


2.  Chronic kidney disease stage III secondary to diabetic kidney disease.  

Baseline creatinine near 1.5.


3.  Hyperkalemia secondary to acute kidney injury and further worsened with the 

use of Dyazide, enalapril and Aldactone.  Better.


4.  Metabolic acidosis secondary to acute kidney injury.  


5.  Hypercalcemia.  This can be from Dyazide as well as volume depletion.  

Patient was also taking vitamin D at home which is currently held.  Resolved.


6.  Insulin-dependent diabetes mellitus.


7.  A. fib with RVR.  Status post Cardizem drip.  Maintained on beta blocker and

anticoagulation.  Cardiology following.


8.  Hyperthyroidism.  Ultrasound of the neck revealed heterogeneous thyroid 

gland with no obvious masses.


9.  Hypomagnesemia secondary to diuretics and poor oral intake.





Plan:


Continue to hold diuretics and ACE inhibitor.


Replace magnesium.  3 g IV today.


Add oral sodium bicarbonate.


Follow-up immunofixation studies.

## 2019-07-12 NOTE — XR
EXAMINATION TYPE: XR chest 2V

 

DATE OF EXAM: 7/12/2019

 

COMPARISON: 6/17/2019

 

TECHNIQUE: PA and lateral views submitted.

 

HISTORY: Difficulty breathing

 

FINDINGS:

Heart is enlarged and is a diffuse interstitial pattern with bilateral consolidation and pleural effu
edilberto. Chronic deformity right clavicle. Arthropathy of the shoulders. No pneumothorax. Hypertrophic a
nd degenerative change of the spine.

 

IMPRESSION:

1. Diffuse pleural-parenchymal changes most typical of CHF. Correlate clinically to exclude underlyin
g pneumonia.

## 2019-07-12 NOTE — ED
SOB HPI





- General


Chief Complaint: Shortness of Breath


Stated Complaint: DEMI


Time Seen by Provider: 19 10:00


Source: patient, EMS, RN notes reviewed


Mode of arrival: EMS


Limitations: no limitations





- History of Present Illness


Initial Comments: 





This is a 78-year-old female presents by EMS with complaints of not feeling well

for past 4 days she's had shortness of breath some exertional dyspnea.  She 

states she was seen by her cardiologist recently and told that she everything 

looked good.  She currently being treated for urinary tract infection.  She 

called EMS today because of dyspnea.  She was noted have a blood pressure of 

between  is talking and heart rate between 30 and 40.  Be sinus 

bradycardia.  Intermittent episodes of chest pain also.  No reported fevers 

chills nausea vomiting sweats no peripheral edema.


MD Complaint: shortness of breath





- Related Data


                                Home Medications











 Medication  Instructions  Recorded  Confirmed


 


Simvastatin [Zocor] 40 mg PO DAILY 14


 


LORazepam [Ativan] 0.5 mg PO HS PRN 17


 


Timolol [Betimol 0.5% Ophth Soln] 1 drop BOTH EYES DAILY 17


 


Insulin NPH Hum/Reg Insulin Hm 24 unit SQ HS 19





[NovoLIN 70-30 100 UNIT/ML VIAL]   


 


Insulin NPH Hum/Reg Insulin Hm 35 unit SQ QAM 19





[NovoLIN 70-30 100 UNIT/ML VIAL]   


 


Magnesium 200 mg PO DAILY 19


 


Pantoprazole [Protonix] 40 mg PO BID 19


 


Pioglitazone HCl [Actos] 15 mg PO DAILY 19








                                  Previous Rx's











 Medication  Instructions  Recorded


 


Apixaban [Eliquis] 5 mg PO BID #60 tab 19


 


Methimazole [Tapazole] 5 mg PO DAILY #30 tab 19


 


Metoprolol Tartrate [Lopressor] 50 mg PO BID #60 tab 19











                                    Allergies











Allergy/AdvReac Type Severity Reaction Status Date / Time


 


latex Allergy  Rash/Hives Verified 19 10:42














Review of Systems


ROS Statement: 


Those systems with pertinent positive or pertinent negative responses have been 

documented in the HPI.





ROS Other: All systems not noted in ROS Statement are negative.





Past Medical History


Past Medical History: Diabetes Mellitus, GERD/Reflux, Hyperlipidemia, 

Hypertension, Osteoarthritis (OA)


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 

pancreatitis, esophagitis, gastritis, hiatal hernia.     Other hx:  IDDM type 

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 

UTIs.


History of Any Multi-Drug Resistant Organisms: None Reported


Past Surgical History: Cholecystectomy, Heart Catheterization, Tonsillectomy, 

Tubal Ligation


Additional Past Surgical History / Comment(s): EGD with bx 16,   2010 

cardiac cath-normal, bilateral cataract removal, colonoscopy.


Past Anesthesia/Blood Transfusion Reactions: No Reported Reaction


Additional Past Anesthesia/Blood Transfusion Reaction / Comment(s): Pt has 

clausterphobia.


Past Psychological History: Anxiety, Depression


Smoking Status: Former smoker


Past Alcohol Use History: Rare


Past Drug Use History: None Reported





- Past Family History


  ** Father


Family Medical History: Cancer


Additional Family Medical History / Comment(s): Father  at the age of 81 yrs

 of lung cancer.  He was a smoker.





  ** Mother


Family Medical History: Cancer


Additional Family Medical History / Comment(s): Mother had cervical cancer.  She

  at the age of 53 from her lupus.





General Exam





- General Exam Comments


Initial Comments: 





This is a well-developed well-nourished awake alert oriented 3 female


Limitations: no limitations


General appearance: alert, in no apparent distress


Head exam: Present: atraumatic, normocephalic, normal inspection


Eye exam: Present: normal appearance, PERRL, EOMI.  Absent: scleral icterus, 

conjunctival injection, periorbital swelling


ENT exam: Present: mucous membranes dry


Neck exam: Present: normal inspection.  Absent: tenderness, meningismus, 

lymphadenopathy


Respiratory exam: Present: decreased breath sounds.  Absent: respiratory 

distress, wheezes, rales, rhonchi, stridor


Cardiovascular Exam: Present: normal rhythm, bradycardia, normal heart sounds.  

Absent: systolic murmur, diastolic murmur, rubs, gallop, clicks


GI/Abdominal exam: Present: soft, normal bowel sounds.  Absent: distended, 

tenderness, guarding, rebound, rigid


Extremities exam: Present: normal inspection, full ROM, normal capillary refill.

  Absent: tenderness, pedal edema, joint swelling, calf tenderness


Back exam: Present: normal inspection


Neurological exam: Present: alert, oriented X3, CN II-XII intact


Psychiatric exam: Present: normal affect, normal mood


Skin exam: Present: warm, dry, intact, normal color.  Absent: rash





Course


                                   Vital Signs











  19





  10:01


 


Temperature 98.8 F


 


Pulse Rate 43 L


 


Respiratory 16





Rate 


 


Blood Pressure 121/89


 


O2 Sat by Pulse 97





Oximetry 














- Reevaluation(s)


Reevaluation #1: 





19 12:05


Patient did not get much response to atropine.  Patient's heart rate was in the 

low 40s.  She is awake alert.





Medical Decision Making





- Medical Decision Making





I did reevaluate patient several occasions she maintains her blood pressure in 

spite of bradycardia.  She does demonstrate evidence of CHF.  I did discuss the 

case with Dr. Rome.  Patient will be admitted with cardiology consultation.  

He did come see the patient in emergency department.





- Lab Data


Result diagrams: 


                                 19 10:10





                                 19 10:10


                                   Lab Results











  19 Range/Units





  10:10 10:10 10:10 


 


WBC   8.5   (3.8-10.6)  k/uL


 


RBC   4.93   (3.80-5.40)  m/uL


 


Hgb   9.7 L   (11.4-16.0)  gm/dL


 


Hct   32.3 L   (34.0-46.0)  %


 


MCV   65.5 L   (80.0-100.0)  fL


 


MCH   19.7 L   (25.0-35.0)  pg


 


MCHC   30.1 L   (31.0-37.0)  g/dL


 


RDW   17.8 H   (11.5-15.5)  %


 


Plt Count   280   (150-450)  k/uL


 


Neutrophils %   56   %


 


Lymphocytes %   33   %


 


Monocytes %   7   %


 


Eosinophils %   3   %


 


Basophils %   0   %


 


Neutrophils #   4.7   (1.3-7.7)  k/uL


 


Lymphocytes #   2.8   (1.0-4.8)  k/uL


 


Monocytes #   0.6   (0-1.0)  k/uL


 


Eosinophils #   0.2   (0-0.7)  k/uL


 


Basophils #   0.0   (0-0.2)  k/uL


 


Hypochromasia   Moderate   


 


Poikilocytosis   Slight   


 


Anisocytosis   Slight   


 


Microcytosis   Marked   


 


PT     (9.0-12.0)  sec


 


INR     (<1.2)  


 


APTT     (22.0-30.0)  sec


 


Sodium  138    (137-145)  mmol/L


 


Potassium  5.2 H    (3.5-5.1)  mmol/L


 


Chloride  109 H    ()  mmol/L


 


Carbon Dioxide  19 L    (22-30)  mmol/L


 


Anion Gap  10    mmol/L


 


BUN  53 H    (7-17)  mg/dL


 


Creatinine  1.98 H    (0.52-1.04)  mg/dL


 


Est GFR (CKD-EPI)AfAm  27    (>60 ml/min/1.73 sqM)  


 


Est GFR (CKD-EPI)NonAf  24    (>60 ml/min/1.73 sqM)  


 


Glucose  68 L    (74-99)  mg/dL


 


Calcium  9.2    (8.4-10.2)  mg/dL


 


Magnesium  1.7    (1.6-2.3)  mg/dL


 


Total Bilirubin  0.7    (0.2-1.3)  mg/dL


 


AST  24    (14-36)  U/L


 


ALT  20    (9-52)  U/L


 


Alkaline Phosphatase  75    ()  U/L


 


Creatine Kinase  49    ()  U/L


 


Troponin I     (0.000-0.034)  ng/mL


 


NT-Pro-B Natriuret Pep    8920  pg/mL


 


Total Protein  6.5    (6.3-8.2)  g/dL


 


Albumin  3.6    (3.5-5.0)  g/dL














  19 Range/Units





  10:10 10:10 


 


WBC    (3.8-10.6)  k/uL


 


RBC    (3.80-5.40)  m/uL


 


Hgb    (11.4-16.0)  gm/dL


 


Hct    (34.0-46.0)  %


 


MCV    (80.0-100.0)  fL


 


MCH    (25.0-35.0)  pg


 


MCHC    (31.0-37.0)  g/dL


 


RDW    (11.5-15.5)  %


 


Plt Count    (150-450)  k/uL


 


Neutrophils %    %


 


Lymphocytes %    %


 


Monocytes %    %


 


Eosinophils %    %


 


Basophils %    %


 


Neutrophils #    (1.3-7.7)  k/uL


 


Lymphocytes #    (1.0-4.8)  k/uL


 


Monocytes #    (0-1.0)  k/uL


 


Eosinophils #    (0-0.7)  k/uL


 


Basophils #    (0-0.2)  k/uL


 


Hypochromasia    


 


Poikilocytosis    


 


Anisocytosis    


 


Microcytosis    


 


PT  13.9 H   (9.0-12.0)  sec


 


INR  1.4 H   (<1.2)  


 


APTT  28.1   (22.0-30.0)  sec


 


Sodium    (137-145)  mmol/L


 


Potassium    (3.5-5.1)  mmol/L


 


Chloride    ()  mmol/L


 


Carbon Dioxide    (22-30)  mmol/L


 


Anion Gap    mmol/L


 


BUN    (7-17)  mg/dL


 


Creatinine    (0.52-1.04)  mg/dL


 


Est GFR (CKD-EPI)AfAm    (>60 ml/min/1.73 sqM)  


 


Est GFR (CKD-EPI)NonAf    (>60 ml/min/1.73 sqM)  


 


Glucose    (74-99)  mg/dL


 


Calcium    (8.4-10.2)  mg/dL


 


Magnesium    (1.6-2.3)  mg/dL


 


Total Bilirubin    (0.2-1.3)  mg/dL


 


AST    (14-36)  U/L


 


ALT    (9-52)  U/L


 


Alkaline Phosphatase    ()  U/L


 


Creatine Kinase    ()  U/L


 


Troponin I   0.023  (0.000-0.034)  ng/mL


 


NT-Pro-B Natriuret Pep    pg/mL


 


Total Protein    (6.3-8.2)  g/dL


 


Albumin    (3.5-5.0)  g/dL














- EKG Data


-: EKG Interpreted by Me (Sinus bradycardia with sinus arrhythmia the rate was 

38.  Interval 162 QRS )





- Radiology Data


Radiology results: report reviewed (I did review the imaging and report evidence

 of congestive heart failure with interstitial R is the corresponding.), image 

reviewed





Critical Care Time


Critical Care Time: Yes


Critical Care Time: 





37 minutes of critical care time which includes initial presentation with 

history physical labs x-rays multiple reevaluation patient response to therapy 

review of old charting was available discussed with the beta physician admission

 orders neck mentation the above





Disposition


Clinical Impression: 


 Systolic congestive heart failure, Bradycardia, Hypotensive episode, Chronic 

renal failure syndrome





Disposition: ADMITTED AS IP TO THIS HOSP


Condition: Fair


Referrals: 


Mckay Martinez MD [Primary Care Provider] - 1-2 days

## 2019-07-13 NOTE — P.PN
Subjective


Progress Note Date: 07/13/19


Principal diagnosis: 





Bradycardia


She feels okay, still short of breath, no chest pain no abdominal pain no nausea

no vomiting or shortness of breath.








Objective





- Vital Signs


Vital signs: 


                                   Vital Signs











Temp  97.9 F   07/13/19 07:53


 


Pulse  51 L  07/13/19 07:53


 


Resp  18   07/13/19 07:53


 


BP  94/54   07/13/19 07:53


 


Pulse Ox  95   07/13/19 07:53








                                 Intake & Output











 07/12/19 07/13/19 07/13/19





 18:59 06:59 18:59


 


Intake Total 240  


 


Output Total 100  


 


Balance 140  


 


Weight 81.4 kg 79.3 kg 


 


Intake:   


 


  Oral 240  


 


Output:   


 


  Urine 100  


 


Other:   


 


  Voiding Method  Bedside Commode 


 


  # Voids 2 2 














- Exam


Constitutional: Mild distress secondary to shortness of breath





Eyes: Anicteric sclerae, PERRLA





ENMT: NC/AT





Neck:Supple,no JVD





Lungs: Decreased breath sounds no wheezing 





Cardiovascular: Heart regular in rate and rhythm,  No murmurs, gallops, or rubs 

no peripheral edema





Abdominal: Soft Nontender, nom distended, no guarding, no rebound or  rigidity, 

Normoactive bowel sounds   No palpable mass 





Skin: Normal temperature





Extremities:No digital cyanosis No clubbing, Pedal pulses intact and  

symmetrical Radial pulses intact and symmetrical Normal gait and station, No 

calf tenderness


 


Psychiatric: Alert and oriented to person, place and time, Appropriate affect 

Intact judgement   


      


Neuro: Muscles Strength 5/5 in all 4 extremities, Sensation to light touch 

grossly present throughout, Cranial nerves II-XII grossly intact. No focal senso

ry deficits








- Labs


CBC & Chem 7: 


                                 07/13/19 05:29





                                 07/13/19 05:29


Labs: 


                  Abnormal Lab Results - Last 24 Hours (Table)











  07/12/19 07/12/19 07/13/19 Range/Units





  13:41 20:57 05:29 


 


Hgb    9.6 L  (11.4-16.0)  gm/dL


 


Hct    32.5 L  (34.0-46.0)  %


 


MCV    68.2 L  (80.0-100.0)  fL


 


MCH    20.1 L  (25.0-35.0)  pg


 


MCHC    29.4 L  (31.0-37.0)  g/dL


 


RDW    17.8 H  (11.5-15.5)  %


 


Potassium     (3.5-5.1)  mmol/L


 


Carbon Dioxide     (22-30)  mmol/L


 


BUN     (7-17)  mg/dL


 


Creatinine     (0.52-1.04)  mg/dL


 


Glucose     (74-99)  mg/dL


 


POC Glucose (mg/dL)  60 L  163 H   (75-99)  mg/dL














  07/13/19 07/13/19 Range/Units





  05:29 09:15 


 


Hgb    (11.4-16.0)  gm/dL


 


Hct    (34.0-46.0)  %


 


MCV    (80.0-100.0)  fL


 


MCH    (25.0-35.0)  pg


 


MCHC    (31.0-37.0)  g/dL


 


RDW    (11.5-15.5)  %


 


Potassium  5.3 H   (3.5-5.1)  mmol/L


 


Carbon Dioxide  21 L   (22-30)  mmol/L


 


BUN  53 H   (7-17)  mg/dL


 


Creatinine  2.26 H   (0.52-1.04)  mg/dL


 


Glucose  68 L   (74-99)  mg/dL


 


POC Glucose (mg/dL)   236 H  (75-99)  mg/dL














Assessment and Plan


Plan: 


1.  Acute on chronic congestive heart failure, diastolic resolved : Ejection 

fraction 55-60%:  cardiology consultation. hold Lasix 


2.  Acute kidney injury: Serum creatinine 1.9 likely associated with decreased 

perfusion  monitor serum creatinine, avoid nephrotoxins.  Nephrology 

consultation, hold diuretics.  Serum creatinine up to 2.2


3.  COPD exacerbation: Oxygen and bronchodilators as indicated


4.  Chronic atrial fibrillation with bradycardia: Holding metoprolol, cardiology

consultation, continue eliquis , monitor 


5.  Diabetes type 2 with hyperglycemia,  on insulin sliding scale


6.  Anxiety: Continue Ativan as needed


7.  History of hypothyroidism: Continue methimazole


8.  GERD without esophagitis: Continue PPI


DVT prophylaxis: eliquis 


Disposition: Home likely Monday

## 2019-07-13 NOTE — P.CRDCN
History of Present Illness


Consult date: 19


Reason for Consult (text): 





Bradycardia


Consult reason: congestive heart failure


History of present illness: 





This is a 78-year-old  female patient of Dr. Gonzalez with history 

of hypertension, diabetes, hyperlipidemia, prior history of smoking, rare EtOH. 

Patient gives history of seen Dr. Glass on Wednesday or Thursday of last week.  

He plans for a stress test to be done in August.  Patient came into [facility] due to palpitations.  She may have some chest pain on the left 

sternal border low sternal area that she thought was related to gastroesophageal

reflux disease.  She does complain of shortness of breath with any activity and 

with talking although she can talk in full sentences.  Patient was brought into 

the [facility] emergency [facility] by EMS.  Patient denies that 

her shortness of breath is improved she has diuresed well overnight.  She states

she has had lower extremity edema but none is noted this time.  Overnight, 

cardiac monitor was bradycardia in the 40s and 50s.  Patient verbalizes anxiety 

regarding getting up to the commode to urinate secondary to use of Lasix.  The 

patient appears very anxious





EKG reveals sinus bradycardia at rate of 38.  


Echocardiogram in  revealed EF of 55-60%, mild mitral regurgitation.


Laboratory studies: WBC 8.5, hemoglobin 9.7, platelet count 280.  Sodium 1:30, 

potassium 5.2, chloride 109, CO2 19 BUN 53 and creatinine 1.98, initial blood 

sugar 68.  Troponin 0.023, 0.021, 0.018.








Review Of Systems:


Constitutional: No fever, no chills, no night sweats.  No weight change.  No 

weakness, fatigue or lethargy.  No daytime sleepiness.


EENT: No headache.  No blurred vision or double vision, no loss of vision.  No 

loss of Hearing, no ringing in the ears, no dizziness.  No nasal drainage or 

congestion.  No epistaxis.  No sore throat.


Lungs: Reports shortness of breath, reports cough, reports sputum production.  

Reports wheezing.


Cardiovascular: Reports chest pain, reports lower extremity edema.  Reports pal

pitations.  Reports lightheadedness or dizziness.  No syncopal episodes.


Abdominal: No abdominal pain.  No nausea, vomiting.  No diarrhea.  No 

constipation.  No bloody or tarry stools..  No loss of appetite.


Genitourinary: No dysuria, increased frequency, urgency.  No urinary retention.


Musculoskeletal: No myalgias.  No muscle weakness, no gait dysfunction, no 

frequent falls.  No back pain.  No neck pain.


Integumentary: No wounds, no lesions.  No rash or pruritus.  No unusual 

bruising.  No change in hair or nails.


Neurologic: No aphasia. No facial droop. No change in mentation. No head injury.

No headache. No paralysis. No paresthesia.


Psychiatric: No depression.  Reports anxiety.  No mood swings.


Endocrine: No abnormal blood sugars.  No weight change.  No excessive sweating 

or thirst.  No cold intolerance.  No weight change.








Gen: This is a 78-year-old  female.  Patient appears to be very 

anxious.


HEENT: Head is atraumatic, normocephalic. Pupils equal, round. Sclerae is anic

teric. 


NECK: Supple. No JVD. No lymphadenopathy. No thyromegaly. 


LUNGS: Scattered expiratory wheeze tachypnea No intercostal retractions.


HEART: S1-S2 regular, systolic murmur.  Bradycardic.


ABDOMEN: Soft. Bowel sounds are present. No masses.  No tenderness.


EXTREMITIES: No pedal edema.  No calf tenderness.  Dorsalis pedis 1+ bilaterally


NEUROLOGICAL: Patient is awake, alert and oriented x3. Cranial nerves 2 through 

12 are grossly intact. 





 





Assessment:


Symptomatic bradycardia possibly related to hyperkalemia and beta blocker


Acute kidney injury


COPD exacerbation


Hypertension


Diabetes mellitus type 2 presented with hypo-glycemia


Hyperlipidemia


Paroxysmal atrial fibrillation








Plan:


Discontinue Lasix 40 mg IV every 12 hour


Discontinue Lopressor 50 mg twice daily 


Continue eliquis 5 mg twice daily, Lipitor 20 g daily


Obtain 2-D echocardiogram and Doppler study to assess cardiac structure and 

function check 


TSH, free T4


Continue cardiac monitoring


Thank you kindly for this consultation.


Further recommendations to follow based on clinical course.





Nurse practitioner note has been reviewed, I agree with documented findings and 

plan of care.  Patient was seen and examined.





Past Medical History


Past Medical History: Atrial Fibrillation, Diabetes Mellitus, Eye Disorder, 

GERD/Reflux, Hyperlipidemia, Hypertension, Osteoarthritis (OA), Renal Disease, 

Thyroid Disorder


Additional Past Medical History / Comment(s): Pt admitted on 19 with new 

onset afib/hyperthyroidism/KATHERINE/hyperkalemia/hypomagnesemia/echo was 55-60%.     

 Other hx:  2/21/17 with idiopathic pancreatitis, esophagitis, gastritis, hiatal

hernia,  IDDM type II, CKD stage III, possible vasospastic angina, sinus 

problems, arthritis in back, chronic R shoulder pain since fall/fracture 1 year 

ago,  prone to UTIs, bilateral glaucoma with surgery.


History of Any Multi-Drug Resistant Organisms: None Reported


Past Surgical History: Cholecystectomy, Heart Catheterization, Tonsillectomy, 

Tubal Ligation


Additional Past Surgical History / Comment(s): EGD with bx 17,   2010 

cardiac cath-normal, bilateral cataract removal, bilateral eye surgery for 

glaucoma, colonoscopy.


Past Anesthesia/Blood Transfusion Reactions: No Reported Reaction


Additional Past Anesthesia/Blood Transfusion Reaction / Comment(s): Pt has 

clausterphobia.


Smoking Status: Former smoker





- Past Family History


  ** Father


Family Medical History: Cancer


Additional Family Medical History / Comment(s): Father  at the age of 81 yrs

of lung cancer.  He was a smoker.





  ** Mother


Family Medical History: Cancer


Additional Family Medical History / Comment(s): Mother had cervical cancer.  She

 at the age of 53 from her lupus.





Medications and Allergies


                                Home Medications











 Medication  Instructions  Recorded  Confirmed  Type


 


Simvastatin [Zocor] 40 mg PO DAILY 14 History


 


LORazepam [Ativan] 0.5 mg PO HS PRN 17 History


 


Timolol [Betimol 0.5% Ophth Soln] 1 drop BOTH EYES DAILY 17 

History


 


Insulin NPH Hum/Reg Insulin Hm 24 unit SQ HS 19 History





[NovoLIN 70-30 100 UNIT/ML VIAL]    


 


Insulin NPH Hum/Reg Insulin Hm 35 unit SQ QAM 19 History





[NovoLIN 70-30 100 UNIT/ML VIAL]    


 


Magnesium 200 mg PO DAILY 19 History


 


Pantoprazole [Protonix] 40 mg PO BID 19 History


 


Pioglitazone HCl [Actos] 15 mg PO DAILY 19 History


 


Apixaban [Eliquis] 5 mg PO BID #60 tab 19 Rx


 


Methimazole [Tapazole] 5 mg PO DAILY #30 tab 19 Rx


 


Metoprolol Tartrate [Lopressor] 50 mg PO BID #60 tab 19 Rx








                                    Allergies











Allergy/AdvReac Type Severity Reaction Status Date / Time


 


latex Allergy  Rash/Hives Verified 19 10:42














Physical Exam


Vitals: 


                                   Vital Signs











  Temp Pulse Pulse Resp BP BP Pulse Ox


 


 19 07:53  97.9 F   51 L  18   94/54  95


 


 19 04:20  97.8 F   50 L  20   157/72  94 L


 


 19 00:10  98.2 F   56 L  20   144/60  95


 


 19 19:56  98.4 F   49 L  18   120/54  96


 


 19 16:00  98.0 F   43 L  18   130/60  98


 


 19 15:00   45 L   20  116/99   96


 


 19 12:39   43 L   16  119/51   96


 


 19 10:01  98.8 F  43 L   16  121/89   97








                                Intake and Output











 19





 22:59 06:59 14:59


 


Intake Total 240  


 


Output Total 100  


 


Balance 140  


 


Intake:   


 


  Oral 240  


 


Output:   


 


  Urine 100  


 


Other:   


 


  Voiding Method Bedside Commode Bedside Commode 


 


  # Voids 1 2 


 


  Weight 81.4 kg 79.3 kg 














Results





                                 19 05:29





                                 19 05:29


                                 Cardiac Enzymes











  19 Range/Units





  10:10 10:10 16:10 


 


AST  24    (14-36)  U/L


 


Troponin I   0.023  0.021  (0.000-0.034)  ng/mL














  19 Range/Units





  00:05 05:29 


 


AST   22  (14-36)  U/L


 


Troponin I  0.018   (0.000-0.034)  ng/mL








                                   Coagulation











  19 Range/Units





  10:10 


 


PT  13.9 H  (9.0-12.0)  sec


 


APTT  28.1  (22.0-30.0)  sec








                                       CBC











  19 Range/Units





  10:10 05:29 


 


WBC  8.5  8.2  (3.8-10.6)  k/uL


 


RBC  4.93  4.77  (3.80-5.40)  m/uL


 


Hgb  9.7 L  9.6 L  (11.4-16.0)  gm/dL


 


Hct  32.3 L  32.5 L  (34.0-46.0)  %


 


Plt Count  280  251  (150-450)  k/uL








                          Comprehensive Metabolic Panel











  19 Range/Units





  10:10 05:29 


 


Sodium  138  138  (137-145)  mmol/L


 


Potassium  5.2 H  5.3 H  (3.5-5.1)  mmol/L


 


Chloride  109 H  107  ()  mmol/L


 


Carbon Dioxide  19 L  21 L  (22-30)  mmol/L


 


BUN  53 H  53 H  (7-17)  mg/dL


 


Creatinine  1.98 H  2.26 H  (0.52-1.04)  mg/dL


 


Glucose  68 L  68 L  (74-99)  mg/dL


 


Calcium  9.2  9.6  (8.4-10.2)  mg/dL


 


AST  24  22  (14-36)  U/L


 


ALT  20  21  (9-52)  U/L


 


Alkaline Phosphatase  75  79  ()  U/L


 


Total Protein  6.5  6.4  (6.3-8.2)  g/dL


 


Albumin  3.6  3.5  (3.5-5.0)  g/dL








                               Current Medications











Generic Name Dose Route Start Last Admin





  Trade Name Freq  PRN Reason Stop Dose Admin


 


Apixaban  5 mg  19 21:00  19 09:10





  Eliquis  PO   5 mg





  BID ANA   Administration


 


Atorvastatin Calcium  20 mg  19 09:00  19 09:11





  Lipitor  PO   20 mg





  DAILY ANA   Administration


 


Furosemide  40 mg  19 21:00  19 09:10





  Lasix  IV   40 mg





  Q12H ANA   Administration


 


Insulin Aspart  0 unit  19 12:30  19 06:56





  Novolog  SQ   Not Given





  [facility]  





  Protocol  


 


Insulin Aspart  35 unit  19 07:30  19 09:22





  Novolog Mix 70-30 Vial  SQ   35 unit





  AC-BRKFST ANA   Administration


 


Insulin Aspart  24 unit  19 21:00  19 21:26





  Novolog Mix 70-30 Vial  SQ   24 unit





  HS ANA   Administration


 


Lorazepam  0.5 mg  19 12:15  19 21:27





  Ativan  PO   0.5 mg





  HS PRN   Administration





  Anxiety  


 


Magnesium Oxide  200 mg  19 09:00  19 09:10





  Mag-Ox  PO   200 mg





  DAILY ANA   Administration


 


Methimazole  5 mg  19 09:00  19 09:10





  Tapazole  PO   5 mg





  DAILY ANA   Administration


 


Metoprolol Tartrate  50 mg  19 21:00  19 09:10





  Lopressor  PO   50 mg





  BID ANA   Administration


 


Pantoprazole Sodium  40 mg  19 17:30  19 06:56





  Protonix  PO   40 mg





  AC-BID ANA   Administration


 


Timolol Maleate  1 drops  19 09:00  19 09:11





  Timoptic  BOTH EYES   1 drops





  DAILY ANA   Administration








                                Intake and Output











 19





 22:59 06:59 14:59


 


Intake Total 240  


 


Output Total 100  


 


Balance 140  


 


Intake:   


 


  Oral 240  


 


Output:   


 


  Urine 100  


 


Other:   


 


  Voiding Method Bedside Commode Bedside Commode 


 


  # Voids 1 2 


 


  Weight 81.4 kg 79.3 kg 








                                        





                                 19 05:29 





                                 19 05:29

## 2019-07-13 NOTE — P.NPCON
History of Present Illness





- Reason for Consult


Consult date: 19


acute renal failure





- Chief Complaint


Shortness of breath with acute kidney injury





- History of Present Illness





This is a 78-year-old female seen in consultation because of acute kidney injury

likely from congestive heart failure, is also known to us with chronic kidney 

disease secondary diabetic nephropathy.  She came in because of worsening 

shortness of breath.  She was not on any Lasix ever supposedly.  No fever chills

she does have a cough with expectoration which is difficult and scanty and makes

her throw up after a little while.  Occasionally she does feel dizzy on standing

up.  No history of chest pain.  No nausea vomiting diarrhea.  Appetite is poor.





She follows up with Dr. Sánchez for her chronic kidney disease.





She was recently admitted and discharged 2 weeks ago.  She is known with chronic

kidney disease creatinine is 1.51 on 03/15/2019 and was slightly up at 1.74 on 

2019.  She came in with a creatinine of 2.26.





She likely has diabetic nephropathy.  She is known with diabetes.











Past Medical History


Past Medical History: Atrial Fibrillation, Diabetes Mellitus, Eye Disorder, 

GERD/Reflux, Hyperlipidemia, Hypertension, Osteoarthritis (OA), Renal Disease, 

Thyroid Disorder


Additional Past Medical History / Comment(s): Pt admitted on 19 with new 

onset afib/hyperthyroidism/KATHERINE/hyperkalemia/hypomagnesemia/echo was 55-60%.     

 Other hx:  16 with idiopathic pancreatitis, esophagitis, gastritis, hiatal

hernia,  IDDM type II, CKD stage III, possible vasospastic angina, sinus 

problems, arthritis in back, chronic R shoulder pain since fall/fracture 1 year 

ago,  prone to UTIs, bilateral glaucoma with surgery.


History of Any Multi-Drug Resistant Organisms: None Reported


Past Surgical History: Cholecystectomy, Heart Catheterization, Tonsillectomy, 

Tubal Ligation


Additional Past Surgical History / Comment(s): EGD with bx 17,   2010 

cardiac cath-normal, bilateral cataract removal, bilateral eye surgery for 

glaucoma, colonoscopy.


Past Anesthesia/Blood Transfusion Reactions: No Reported Reaction


Additional Past Anesthesia/Blood Transfusion Reaction / Comment(s): Pt has 

clausterphobia.


Smoking Status: Former smoker





- Past Family History


  ** Father


Family Medical History: Cancer


Additional Family Medical History / Comment(s): Father  at the age of 81 yrs

of lung cancer.  He was a smoker.





  ** Mother


Family Medical History: Cancer


Additional Family Medical History / Comment(s): Mother had cervical cancer.  She

 at the age of 53 from her lupus.





Medications and Allergies


                                Home Medications











 Medication  Instructions  Recorded  Confirmed  Type


 


Simvastatin [Zocor] 40 mg PO DAILY 14 History


 


LORazepam [Ativan] 0.5 mg PO HS PRN 17 History


 


Timolol [Betimol 0.5% Ophth Soln] 1 drop BOTH EYES DAILY 17 

History


 


Insulin NPH Hum/Reg Insulin Hm 24 unit SQ HS 19 History





[NovoLIN 70-30 100 UNIT/ML VIAL]    


 


Insulin NPH Hum/Reg Insulin Hm 35 unit SQ QAM 19 History





[NovoLIN 70-30 100 UNIT/ML VIAL]    


 


Magnesium 200 mg PO DAILY 19 History


 


Pantoprazole [Protonix] 40 mg PO BID 19 History


 


Pioglitazone HCl [Actos] 15 mg PO DAILY 19 History


 


Apixaban [Eliquis] 5 mg PO BID #60 tab 19 Rx


 


Methimazole [Tapazole] 5 mg PO DAILY #30 tab 19 Rx


 


Metoprolol Tartrate [Lopressor] 50 mg PO BID #60 tab 19 Rx








                                    Allergies











Allergy/AdvReac Type Severity Reaction Status Date / Time


 


latex Allergy  Rash/Hives Verified 19 10:42














Physical Exam


Vitals: 


                                   Vital Signs











  Temp Pulse Pulse Resp BP BP Pulse Ox


 


 19 11:27  97.7 F   44 L  18   112/58  97


 


 19 07:53  97.9 F   51 L  18   94/54  95


 


 19 04:20  97.8 F   50 L  20   157/72  94 L


 


 19 00:10  98.2 F   56 L  20   144/60  95


 


 19 19:56  98.4 F   49 L  18   120/54  96


 


 19 16:00  98.0 F   43 L  18   130/60  98


 


 19 15:00   45 L   20  116/99   96








                                Intake and Output











 19





 22:59 06:59 14:59


 


Intake Total 240  


 


Output Total 100  


 


Balance 140  


 


Intake:   


 


  Oral 240  


 


Output:   


 


  Urine 100  


 


Other:   


 


  Voiding Method Bedside Commode Bedside Commode 


 


  # Voids 1 2 


 


  Weight 81.4 kg 79.3 kg 79.3 kg








On examination she is comfortable in bed.





Awake alert oriented 3.  She is somewhat upset because of getting admitted 

recently discharge and readmitted





HEENT exam no JVP neck is supple no facial asymmetry





Lungs are significant for an occasional bilateral crackles at bases





Heart sounds are unremarkable for any murmur rub gallop





Abdomen soft nontender





Extreme exam was minimal edema





Neurologically awake alert oriented.





Results





- Lab Results


                             Most recent lab results











Calcium  9.6 mg/dL (8.4-10.2)   19  05:29    


 


Magnesium  1.7 mg/dL (1.6-2.3)   19  10:10    














                                 19 05:29





                                 19 05:29





Assessment and Plan


Assessment: 





Impression





1.  Acute kidney injury secondary to congestive heart failure.  Creatinine went 

up to 2.26





2.  Chronic kidney disease diabetic nephropathy creatinine baseline is approxi

mately 1.5-1.7 or the last few months.  A serum immunofixation result been 

negative on 2019





3.  Hyperkalemia potassium is 5.2 and 5.3.  This is associated with RTA4.





4.  RTA type IV with acidosis with bicarb in the 19 range with a gap of 10.  

This is due to diabetic nephropathy.





5.  Congestive heart failure.





6.  Anemia of chronic kidney disease hemoglobin is 9.6





Admission





1.  Lasix IV 20 every 12.





2.  Avoid hydration with IV fluids





3.  Avoid nonsteroidals





4.  Check iron saturation





5.  Strict I's and os

## 2019-07-13 NOTE — ECHOF
Referral Reason:EF eval



MEASUREMENTS

--------

HEIGHT: 160.0 cm

WEIGHT: 78.9 kg

BP: 112/58

IVSd:   1.6 cm     (0.6 - 1.1)

LVIDd:   3.6 cm     (3.9 - 5.3)

LVPWd:   1.8 cm     (0.6 - 1.1)

IVSs:   1.6 cm

LVIDs:   2.7 cm

LVPWs:   2.0 cm

RVIDd:   3.3 cm     (< 3.3)

LAESV Index (A-L):   32.34 ml/m

Ao Diam:   3.0 cm     (2.0 - 3.7)

LA Diam:   4.0 cm     (2.7 - 3.8)

AV Cusp:   1.5 cm     (1.5 - 2.6)

MV E Sergio:   1.34 m/s

MV DecT:   192 ms

MV A Sergio:   0.48 m/s

MV E/A Ratio:   2.79 

RAP:   20.00 mmHg

RVSP:   78.65 mmHg







FINDINGS

--------

Resting bradycardia (HR<60bpm).

This was a technically adequate study.

The left ventricular size is normal.   There is moderate concentric left ventricular hypertrophy.   O
verall left ventricular systolic function is low-normal with, an EF between 50 - 55 %.   Restrictive 
LV  filling pattern, consistent with elevated LA pressure 37.55.

The right ventricle is mildly enlarged.

LA is midly dilated 29-33ml/m2.

The right atrial size is normal.

Interatrial and interventricular septum intact.

There is mild to moderate aortic valve sclerosis.   There is no evidence of aortic stenosis.

The mitral valve leaflets are mildly thickened.   Moderate mitral annular calcification present.   Mo
derate mitral regurgitation is present.

Moderate tricuspid regurgitation present.   There is severe pulmonary hypertension.   The right ventr
icular systolic pressure, as measured by Doppler, is 78.65mmHg.

There is no pulmonic regurgitation present.

The aortic root size is normal.

The inferior vena cava is dilated with no significant inspiratory collapse which is consistent estima
caren right atrial pressure of >20 mmHg.

There is no pericardial effusion.



CONCLUSIONS

--------

1. Resting bradycardia (HR<60bpm).

2. This was a technically adequate study.

3. The left ventricular size is normal.

4. There is moderate concentric left ventricular hypertrophy.

5. Overall left ventricular systolic function is low-normal with, an EF between 50 - 55 %.

6. Restrictive LV filling pattern, consistent with elevated LA pressure 37.55.

7. The right ventricle is mildly enlarged.

8. LA is midly dilated 29-33ml/m2.

9. The right atrial size is normal.

10. Interatrial and interventricular septum intact.

11. There is mild to moderate aortic valve sclerosis.

12. There is no evidence of aortic stenosis.

13. The mitral valve leaflets are mildly thickened.

14. Moderate mitral annular calcification present.

15. Moderate mitral regurgitation is present.

16. Moderate tricuspid regurgitation present.

17. There is severe pulmonary hypertension.

18. The right ventricular systolic pressure, as measured by Doppler, is 78.65mmHg.

19. There is no pulmonic regurgitation present.

20. The aortic root size is normal.

21. The inferior vena cava is dilated with no significant inspiratory collapse which is consistent es
timated right atrial pressure of >20 mmHg.

22. There is no pericardial effusion.





SONOGRAPHER: Edwina Lombardo RDCS

## 2019-07-14 NOTE — P.PN
Subjective


Progress Note Date: 07/14/19





This is a 78-year-old  female patient of Dr. Gonzalez with history 

of hypertension, diabetes, hyperlipidemia, prior history of smoking, rare EtOH. 

Patient gives history of seen Dr. Glass on Wednesday or Thursday of last week.  

He plans for a stress test to be done in August.  Patient came into [facility] due to palpitations.  She may have some chest pain on the left 

sternal border low sternal area that she thought was related to gastroesophageal

reflux disease.  She does complain of shortness of breath with any activity and 

with talking although she can talk in full sentences.  Patient was brought into 

the [facility] emergency center by EMS.  Patient denies that 

her shortness of breath is improved she has diuresed well overnight.  She states

she has had lower extremity edema but none is noted this time.  Overnight, 

cardiac monitor was bradycardia in the 40s and 50s.  Patient verbalizes anxiety 

regarding getting up to the commode to urinate secondary to use of Lasix.  The 

patient appears very anxious





EKG reveals sinus bradycardia at rate of 38.  


Echocardiogram in June revealed EF of 55-60%, mild mitral regurgitation.


Laboratory studies: WBC 8.5, hemoglobin 9.7, platelet count 280.  Sodium 1:30, 

potassium 5.2, chloride 109, CO2 19 BUN 53 and creatinine 1.98, initial blood 

sugar 68.  Troponin 0.023, 0.021, 0.018.





7/14: Patient states that she continues to have shortness of breath.  She is 

complaining of chest pain that is between her breasts that goes through to her 

back.  She is also complaining of right shoulder pain for which she is using ice

pack.  Lasix has been discontinued.  Cardiac monitor has been a sinus 

bradycardia in the low 50s overnight.  Orthostatic vital signs have been 

negative.  Blood pressure 151/51 and amlodipine 5 mg daily will be started.  

Echocardiogram reveals EF 50-55% with moderate concentric left ventricular 

hypertrophy, mild to moderate aortic valve sclerosis, no aortic stenosis, 

moderate mitral regurgitation, moderate tricuspid regurgitation, severe 

pulmonary hypertension, right ventricular systolic pressure of 78.65 mmHg, no 

pulmonary regurgitation.  Patient is followed by nephrology for acute kidney 

injury.  TSH 0.0-1, free T4 2 0.63.  BUN 57 creatinine 2.49 with potassium of 

5.4.  Hemoglobin 9.8.











Gen: This is a 78-year-old  female.  Patient appears to be very 

anxious.


HEENT: Head is atraumatic, normocephalic. Pupils equal, round. Sclerae is 

anicteric. 


NECK: Supple. No JVD. No lymphadenopathy. No thyromegaly. 


LUNGS: Poor inspiratory effort, clear to auscultation No intercostal 

retractions.


HEART: S1-S2 regular, systolic murmur.  Bradycardic.


ABDOMEN: Soft. Bowel sounds are present. No masses.  No tenderness.


EXTREMITIES: Trace bilateral pedal edema.  No calf tenderness.  Dorsalis pedis 

1+ bilaterally


NEUROLOGICAL: Patient is awake, alert and oriented x3. Cranial nerves 2 through 

12 are grossly intact. 





 





Assessment:


Symptomatic bradycardia possibly related to hyperkalemia and beta blocker


Acute kidney injury


COPD exacerbation


Hypertension


Diabetes mellitus type 2 presented with hypo-glycemia


Hyperlipidemia


Paroxysmal atrial fibrillation








Plan:


Discontinue Lasix 


Continue to hold Lopressor 50 mg twice daily 


Continue eliquis 5 mg twice daily, Lipitor 20 g daily 


Start amlodipine 5 mg daily.


TSH, free T4 abnormal, primary to address.


Continue cardiac monitoring


Thank you kindly for this consultation.


Further recommendations to follow based on clinical course.





Nurse practitioner note has been reviewed, I agree with documented findings and 

plan of care.  Patient was seen and examined.








Objective





- Vital Signs


Vital signs: 


                                   Vital Signs











Temp  98.4 F   07/14/19 07:21


 


Pulse  61   07/14/19 08:34


 


Resp  20   07/14/19 07:21


 


BP  129/65   07/14/19 08:34


 


Pulse Ox  96   07/14/19 08:34








                                 Intake & Output











 07/13/19 07/14/19 07/14/19





 18:59 06:59 18:59


 


Intake Total 360  240


 


Output Total 900 1300 


 


Balance -540 -1300 240


 


Weight 79.3 kg 78.4 kg 


 


Intake:   


 


  Oral 360  240


 


Output:   


 


  Urine 900 1300 


 


Other:   


 


  Voiding Method  Bedside Commode 


 


  # Voids  1 














- Labs


CBC & Chem 7: 


                                 07/14/19 05:47





                                 07/14/19 05:47


Labs: 


                  Abnormal Lab Results - Last 24 Hours (Table)











  07/13/19 07/13/19 07/13/19 Range/Units





  05:29 11:52 16:47 


 


Hgb     (11.4-16.0)  gm/dL


 


Hct     (34.0-46.0)  %


 


MCV     (80.0-100.0)  fL


 


MCH     (25.0-35.0)  pg


 


MCHC     (31.0-37.0)  g/dL


 


RDW     (11.5-15.5)  %


 


Potassium     (3.5-5.1)  mmol/L


 


BUN     (7-17)  mg/dL


 


Creatinine     (0.52-1.04)  mg/dL


 


POC Glucose (mg/dL)   70 L  66 L  (75-99)  mg/dL


 


TSH  0.021 L    (0.465-4.680)  mIU/L


 


Free T4  2.63 H    (0.78-2.19)  ng/dL


 


Urine Appearance     (Clear)  


 


Urine Blood     (Negative)  


 


Ur Leukocyte Esterase     (Negative)  


 


Urine WBC     (0-5)  /hpf


 


Urine WBC Clumps     (None)  /hpf


 


Urine Bacteria     (None)  /hpf


 


Urine Mucus     (None)  /hpf














  07/13/19 07/14/19 07/14/19 Range/Units





  20:28 00:51 01:07 


 


Hgb     (11.4-16.0)  gm/dL


 


Hct     (34.0-46.0)  %


 


MCV     (80.0-100.0)  fL


 


MCH     (25.0-35.0)  pg


 


MCHC     (31.0-37.0)  g/dL


 


RDW     (11.5-15.5)  %


 


Potassium     (3.5-5.1)  mmol/L


 


BUN     (7-17)  mg/dL


 


Creatinine     (0.52-1.04)  mg/dL


 


POC Glucose (mg/dL)  187 H  31 L  48 L  (75-99)  mg/dL


 


TSH     (0.465-4.680)  mIU/L


 


Free T4     (0.78-2.19)  ng/dL


 


Urine Appearance     (Clear)  


 


Urine Blood     (Negative)  


 


Ur Leukocyte Esterase     (Negative)  


 


Urine WBC     (0-5)  /hpf


 


Urine WBC Clumps     (None)  /hpf


 


Urine Bacteria     (None)  /hpf


 


Urine Mucus     (None)  /hpf














  07/14/19 07/14/19 07/14/19 Range/Units





  05:47 05:47 07:47 


 


Hgb  9.8 L    (11.4-16.0)  gm/dL


 


Hct  33.0 L    (34.0-46.0)  %


 


MCV  67.0 L    (80.0-100.0)  fL


 


MCH  19.9 L    (25.0-35.0)  pg


 


MCHC  29.7 L    (31.0-37.0)  g/dL


 


RDW  17.2 H    (11.5-15.5)  %


 


Potassium   5.4 H   (3.5-5.1)  mmol/L


 


BUN   57 H   (7-17)  mg/dL


 


Creatinine   2.49 H   (0.52-1.04)  mg/dL


 


POC Glucose (mg/dL)     (75-99)  mg/dL


 


TSH     (0.465-4.680)  mIU/L


 


Free T4     (0.78-2.19)  ng/dL


 


Urine Appearance    Cloudy H  (Clear)  


 


Urine Blood    Trace H  (Negative)  


 


Ur Leukocyte Esterase    Large H  (Negative)  


 


Urine WBC    150 H  (0-5)  /hpf


 


Urine WBC Clumps    Few H  (None)  /hpf


 


Urine Bacteria    Moderate H  (None)  /hpf


 


Urine Mucus    Rare H  (None)  /hpf

## 2019-07-14 NOTE — P.PN
Subjective


Principal diagnosis: 





Bradycardia


Feels better today, no chest pain no abdominal pain no shortness of breath no 

nausea no vomiting.








Objective





- Vital Signs


Vital signs: 


                                   Vital Signs











Temp  98.5 F   07/14/19 11:20


 


Pulse  52 L  07/14/19 11:20


 


Resp  20   07/14/19 11:20


 


BP  149/82   07/14/19 11:20


 


Pulse Ox  95   07/14/19 11:20








                                 Intake & Output











 07/13/19 07/14/19 07/14/19





 18:59 06:59 18:59


 


Intake Total 360  240


 


Output Total 900 1300 500


 


Balance -540 -1300 -260


 


Weight 79.3 kg 78.4 kg 


 


Intake:   


 


  Oral 360  240


 


Output:   


 


  Urine 900 1300 500


 


Other:   


 


  Voiding Method  Bedside Commode 


 


  # Voids  1 














- Exam


Constitutional: No distress





Eyes: Anicteric sclerae, PERRLA





ENMT: NC/AT





Neck:Supple,no JVD





Lungs: Decreased breath sounds no wheezing no crackles 





Cardiovascular: Heart regular in rate and rhythm,  No murmurs, gallops, or rubs 

no peripheral edema





Abdominal: Soft Nontender, nom distended, no guarding, no rebound or  rigidity, 

Normoactive bowel sounds   No palpable mass 





Skin: Normal temperature





Extremities:No digital cyanosis No clubbing


 


Psychiatric: Alert and oriented to person, place and time


      


Neuro: Muscles Strength 5/5 in all 4 extremities, Sensation to light touch denise

ssly present throughout, Cranial nerves II-XII grossly intact. No focal sensory 

deficits








- Labs


CBC & Chem 7: 


                                 07/14/19 05:47





                                 07/14/19 05:47


Labs: 


                  Abnormal Lab Results - Last 24 Hours (Table)











  07/13/19 07/13/19 07/13/19 Range/Units





  05:29 11:52 16:47 


 


Hgb     (11.4-16.0)  gm/dL


 


Hct     (34.0-46.0)  %


 


MCV     (80.0-100.0)  fL


 


MCH     (25.0-35.0)  pg


 


MCHC     (31.0-37.0)  g/dL


 


RDW     (11.5-15.5)  %


 


Potassium     (3.5-5.1)  mmol/L


 


BUN     (7-17)  mg/dL


 


Creatinine     (0.52-1.04)  mg/dL


 


POC Glucose (mg/dL)   70 L  66 L  (75-99)  mg/dL


 


TSH  0.021 L    (0.465-4.680)  mIU/L


 


Free T4  2.63 H    (0.78-2.19)  ng/dL


 


Urine Appearance     (Clear)  


 


Urine Blood     (Negative)  


 


Ur Leukocyte Esterase     (Negative)  


 


Urine WBC     (0-5)  /hpf


 


Urine WBC Clumps     (None)  /hpf


 


Urine Bacteria     (None)  /hpf


 


Urine Mucus     (None)  /hpf














  07/13/19 07/14/19 07/14/19 Range/Units





  20:28 00:51 01:07 


 


Hgb     (11.4-16.0)  gm/dL


 


Hct     (34.0-46.0)  %


 


MCV     (80.0-100.0)  fL


 


MCH     (25.0-35.0)  pg


 


MCHC     (31.0-37.0)  g/dL


 


RDW     (11.5-15.5)  %


 


Potassium     (3.5-5.1)  mmol/L


 


BUN     (7-17)  mg/dL


 


Creatinine     (0.52-1.04)  mg/dL


 


POC Glucose (mg/dL)  187 H  31 L  48 L  (75-99)  mg/dL


 


TSH     (0.465-4.680)  mIU/L


 


Free T4     (0.78-2.19)  ng/dL


 


Urine Appearance     (Clear)  


 


Urine Blood     (Negative)  


 


Ur Leukocyte Esterase     (Negative)  


 


Urine WBC     (0-5)  /hpf


 


Urine WBC Clumps     (None)  /hpf


 


Urine Bacteria     (None)  /hpf


 


Urine Mucus     (None)  /hpf














  07/14/19 07/14/19 07/14/19 Range/Units





  05:47 05:47 07:47 


 


Hgb  9.8 L    (11.4-16.0)  gm/dL


 


Hct  33.0 L    (34.0-46.0)  %


 


MCV  67.0 L    (80.0-100.0)  fL


 


MCH  19.9 L    (25.0-35.0)  pg


 


MCHC  29.7 L    (31.0-37.0)  g/dL


 


RDW  17.2 H    (11.5-15.5)  %


 


Potassium   5.4 H   (3.5-5.1)  mmol/L


 


BUN   57 H   (7-17)  mg/dL


 


Creatinine   2.49 H   (0.52-1.04)  mg/dL


 


POC Glucose (mg/dL)     (75-99)  mg/dL


 


TSH     (0.465-4.680)  mIU/L


 


Free T4     (0.78-2.19)  ng/dL


 


Urine Appearance    Cloudy H  (Clear)  


 


Urine Blood    Trace H  (Negative)  


 


Ur Leukocyte Esterase    Large H  (Negative)  


 


Urine WBC    150 H  (0-5)  /hpf


 


Urine WBC Clumps    Few H  (None)  /hpf


 


Urine Bacteria    Moderate H  (None)  /hpf


 


Urine Mucus    Rare H  (None)  /hpf














Assessment and Plan


Plan: 


1.  Acute on chronic congestive heart failure, diastolic resolved : Ejection 

fraction 55-60%:  cardiology consultation appreciated . holding Lasix 


2.  Acute kidney injury: Serum creatinine 1.9 likely associated with decreased 

perfusion  monitor serum creatinine, avoid nephrotoxins.  Nephrology 

consultation appreciated , holding diuretics.  Serum creatinine up to 2.2 now 

2.4 


3.  COPD exacerbation: Oxygen and bronchodilators as indicated


4.  Chronic atrial fibrillation with bradycardia: Holding metoprolol, cardiology

consultation, continue eliquis , monitor , heart rate improving.


5.  Diabetes type 2 with hyperglycemia,  on insulin sliding scale


6.  Anxiety: Continue Ativan as needed


7.  History of hypothyroidism: Continue methimazole


8.  GERD without esophagitis: Continue PPI


DVT prophylaxis: eliquis 


Disposition: Home likely Monday

## 2019-07-14 NOTE — P.PN
Subjective


Progress Note Date: 07/14/19


Principal diagnosis: 





This is a 78-year-old female seen in consultation because of acute kidney injury

likely from congestive heart failure, is also known to us with chronic kidney 

disease secondary diabetic nephropathy.  She came in because of worsening 

shortness of breath.  She was not on any Lasix ever supposedly.  No fever chills

she does have a cough with expectoration which is difficult and scanty and makes

her throw up after a little while.  Occasionally she does feel dizzy on standing

up.  No history of chest pain.  No nausea vomiting diarrhea.  Appetite is poor.





She follows up with Dr. Sánchez for her chronic kidney disease.





She was recently admitted and discharged 2 weeks ago.  She is known with chronic

kidney disease creatinine is 1.51 on 03/15/2019 and was slightly up at 1.74 on 

06/17/2019.  She came in with a creatinine of 2.26.


Since admission yesterday she was diuresed with Lasix.  Urine output is 

documented at 2200 mL.





Her creatinine though went up.





She is complaining of feeling nauseated.  He has not had a bowel movement in a 

few days.  No chest pain shortness of breath she is on room air.  She does have 

some mild symptoms of GERD and is on Protonix.  She says she is going to the 

bathroom frequently.  No dysuria.





Objective





- Vital Signs


Vital signs: 


                                   Vital Signs











Temp  98.4 F   07/14/19 07:21


 


Pulse  57 L  07/14/19 07:21


 


Resp  20   07/14/19 07:21


 


BP  123/64   07/14/19 07:21


 


Pulse Ox  93 L  07/14/19 07:21








                                 Intake & Output











 07/13/19 07/14/19 07/14/19





 18:59 06:59 18:59


 


Intake Total 360  


 


Output Total 900 1300 


 


Balance -540 -1300 


 


Weight 79.3 kg 78.4 kg 


 


Intake:   


 


  Oral 360  


 


Output:   


 


  Urine 900 1300 


 


Other:   


 


  Voiding Method  Bedside Commode 


 


  # Voids  1 








On examination is awake alert oriented somewhat anxious.  She does not want to 

go home because she was just readmitted.





HEENT exam no JVP neck is supple no facial asymmetry





Heart sounds are unremarkable for any murmur rub gallop she is in atrial 

fibrillation.





Lungs are clear to auscultation fairly good air entry bilaterally





A chest x-ray had shown significant findings suggestive congestive heart failure





Abdomen soft nontender no masses felt





Extreme exam was trace edema





Neurologically awake alert oriented but anxious





- Labs


CBC & Chem 7: 


                                 07/14/19 05:47





                                 07/14/19 05:47


Labs: 


                  Abnormal Lab Results - Last 24 Hours (Table)











  07/13/19 07/13/19 07/13/19 Range/Units





  05:29 09:15 11:52 


 


Hgb     (11.4-16.0)  gm/dL


 


Hct     (34.0-46.0)  %


 


MCV     (80.0-100.0)  fL


 


MCH     (25.0-35.0)  pg


 


MCHC     (31.0-37.0)  g/dL


 


RDW     (11.5-15.5)  %


 


Potassium     (3.5-5.1)  mmol/L


 


BUN     (7-17)  mg/dL


 


Creatinine     (0.52-1.04)  mg/dL


 


POC Glucose (mg/dL)   236 H  70 L  (75-99)  mg/dL


 


TSH  0.021 L    (0.465-4.680)  mIU/L


 


Free T4  2.63 H    (0.78-2.19)  ng/dL














  07/13/19 07/13/19 07/14/19 Range/Units





  16:47 20:28 00:51 


 


Hgb     (11.4-16.0)  gm/dL


 


Hct     (34.0-46.0)  %


 


MCV     (80.0-100.0)  fL


 


MCH     (25.0-35.0)  pg


 


MCHC     (31.0-37.0)  g/dL


 


RDW     (11.5-15.5)  %


 


Potassium     (3.5-5.1)  mmol/L


 


BUN     (7-17)  mg/dL


 


Creatinine     (0.52-1.04)  mg/dL


 


POC Glucose (mg/dL)  66 L  187 H  31 L  (75-99)  mg/dL


 


TSH     (0.465-4.680)  mIU/L


 


Free T4     (0.78-2.19)  ng/dL














  07/14/19 07/14/19 07/14/19 Range/Units





  01:07 05:47 05:47 


 


Hgb   9.8 L   (11.4-16.0)  gm/dL


 


Hct   33.0 L   (34.0-46.0)  %


 


MCV   67.0 L   (80.0-100.0)  fL


 


MCH   19.9 L   (25.0-35.0)  pg


 


MCHC   29.7 L   (31.0-37.0)  g/dL


 


RDW   17.2 H   (11.5-15.5)  %


 


Potassium    5.4 H  (3.5-5.1)  mmol/L


 


BUN    57 H  (7-17)  mg/dL


 


Creatinine    2.49 H  (0.52-1.04)  mg/dL


 


POC Glucose (mg/dL)  48 L    (75-99)  mg/dL


 


TSH     (0.465-4.680)  mIU/L


 


Free T4     (0.78-2.19)  ng/dL














Assessment and Plan


Assessment: 





Impression





1.  Acute kidney injury secondary to congestive heart failure.  Creatinine went 

up to 2.26, and further up to 2.49 with diuresis





2.  Chronic kidney disease diabetic nephropathy creatinine baseline is 

approximately 1.5-1.7 or the last few months.  A serum immunofixation result 

been negative on 06/18/2019





3.  Hyperkalemia potassium is 5.2 and 5.3, and further to 5.4 this morning.  

This is associated with RTA4.





4.  RTA type IV with acidosis with bicarb in the 19 range with a gap of 10.  

Improved to bicarb of 25 this morning.  This is due to diabetic nephropathy.





5.  Congestive heart failure.  Resolved.





6.  Anemia of chronic kidney disease hemoglobin is 9.6, repeat hemoglobin is 9.8





Admission





1.  Hold Lasix.





2.  Check orthostatic changes.





3.  Check bladder scan for any neurogenic bladder





4.  Pending  iron saturation





5.  Strict I's and os.





6.  Check urinalysis as her chest.  Has shown significant changes and her 

creatinine has gone up.  We will make sure there is no other explanation for 

acute kidney injury such as pulmonary renal syndrome

## 2019-07-15 NOTE — P.PN
Subjective


Progress Note Date: 07/15/19








This is a 78-year-old  female patient of Dr. Gonzalez with history 

of hypertension, diabetes, hyperlipidemia, prior history of smoking, rare EtOH. 

Patient gives history of seen Dr. Glass on Wednesday or Thursday of last week.  

He plans for a stress test to be done in August.  Patient came into [facility] due to palpitations.  She may have some chest pain on the left 

sternal border low sternal area that she thought was related to gastroesophageal

reflux disease.  She does complain of shortness of breath with any activity and 

with talking although she can talk in full sentences.  Patient was brought into 

the [facility] emergency center by EMS.  Patient denies that 

her shortness of breath is improved she has diuresed well overnight.  She states

she has had lower extremity edema but none is noted this time.  Patient was 

noted on the monitor to have significant bradycardia for which her beta blocker 

had been placed on hold.  She was seen and examined today, blood pressure 136/60

with a heart rate in the 60s.  Patient stable overall, ambulating in the hallway

on her own today without any difficulty.





Objective





- Vital Signs


Vital signs: 


                                   Vital Signs











Temp  98.1 F   07/15/19 08:00


 


Pulse  68   07/15/19 11:37


 


Resp  18   07/15/19 11:18


 


BP  141/61   07/15/19 11:18


 


Pulse Ox  94 L  07/15/19 11:18








                                 Intake & Output











 07/14/19 07/15/19 07/15/19





 18:59 06:59 18:59


 


Intake Total 240 622 480


 


Output Total 


 


Balance -510 122 -720


 


Weight  77.6 kg 


 


Intake:   


 


  Intake, IV Titration  300 





  Amount   


 


    Dextrose 10 % in Water  300 





    250 ml @ 999 mls/hr IV   





    ONCE STA Rx#:[id number]   


 


  Oral 240 322 480


 


Output:   


 


  Urine 


 


Other:   


 


  Voiding Method Toilet Bedside Commode Bedside Commode





  Incontinent Incontinent


 


  # Bowel Movements   1














- Exam





HEENT: Head is atraumatic, normocephalic. Pupils equal, round. Sclerae is 

anicteric. 


NECK: Supple. No JVD. No lymphadenopathy. No thyromegaly. 


LUNGS: Scattered expiratory wheeze tachypnea No intercostal retractions.


HEART: S1-S2 regular, systolic murmur.  Bradycardic.


ABDOMEN: Soft. Bowel sounds are present. No masses.  No tenderness.


EXTREMITIES: No pedal edema.  No calf tenderness.  Dorsalis pedis 1+ bilaterally


NEUROLOGICAL: Patient is awake, alert and oriented x3. Cranial nerves 2 through 

12 are grossly intact. 








- Labs


CBC & Chem 7: 


                                 07/15/19 05:58





                                 07/15/19 05:58


Labs: 


                  Abnormal Lab Results - Last 24 Hours (Table)











  07/14/19 07/14/19 07/14/19 Range/Units





  05:47 05:47 17:09 


 


Hgb     (11.4-16.0)  gm/dL


 


MCV     (80.0-100.0)  fL


 


MCH     (25.0-35.0)  pg


 


MCHC     (31.0-37.0)  g/dL


 


RDW     (11.5-15.5)  %


 


BUN     (7-17)  mg/dL


 


Creatinine     (0.52-1.04)  mg/dL


 


Glucose     (74-99)  mg/dL


 


POC Glucose (mg/dL)    157 H  (75-99)  mg/dL


 


Hemoglobin A1c  7.3 H    (4.0-6.0)  %


 


Iron   20 L   ()  ug/dL


 


Iron Saturation   6.67 L   (12.00-45.00)   


 


Urine Appearance     (Clear)  


 


Urine Protein     (Negative)  


 


Urine Blood     (Negative)  


 


Ur Leukocyte Esterase     (Negative)  


 


Urine RBC     (0-5)  /hpf


 


Urine WBC     (0-5)  /hpf


 


Urine WBC Clumps     (None)  /hpf


 


Urine Bacteria     (None)  /hpf














  07/14/19 07/15/19 07/15/19 Range/Units





  20:09 03:35 05:58 


 


Hgb    10.3 L  (11.4-16.0)  gm/dL


 


MCV    67.1 L  (80.0-100.0)  fL


 


MCH    19.7 L  (25.0-35.0)  pg


 


MCHC    29.4 L  (31.0-37.0)  g/dL


 


RDW    17.4 H  (11.5-15.5)  %


 


BUN     (7-17)  mg/dL


 


Creatinine     (0.52-1.04)  mg/dL


 


Glucose     (74-99)  mg/dL


 


POC Glucose (mg/dL)  223 H    (75-99)  mg/dL


 


Hemoglobin A1c     (4.0-6.0)  %


 


Iron     ()  ug/dL


 


Iron Saturation     (12.00-45.00)   


 


Urine Appearance   Turbid H   (Clear)  


 


Urine Protein   1+ H   (Negative)  


 


Urine Blood   Moderate H   (Negative)  


 


Ur Leukocyte Esterase   Large H   (Negative)  


 


Urine RBC   >182 H   (0-5)  /hpf


 


Urine WBC   >182 H   (0-5)  /hpf


 


Urine WBC Clumps   Few H   (None)  /hpf


 


Urine Bacteria   Occasional H   (None)  /hpf














  07/15/19 07/15/19 07/15/19 Range/Units





  05:58 06:10 11:56 


 


Hgb     (11.4-16.0)  gm/dL


 


MCV     (80.0-100.0)  fL


 


MCH     (25.0-35.0)  pg


 


MCHC     (31.0-37.0)  g/dL


 


RDW     (11.5-15.5)  %


 


BUN  46 H    (7-17)  mg/dL


 


Creatinine  1.95 H    (0.52-1.04)  mg/dL


 


Glucose  137 H    (74-99)  mg/dL


 


POC Glucose (mg/dL)   140 H  147 H  (75-99)  mg/dL


 


Hemoglobin A1c     (4.0-6.0)  %


 


Iron     ()  ug/dL


 


Iron Saturation     (12.00-45.00)   


 


Urine Appearance     (Clear)  


 


Urine Protein     (Negative)  


 


Urine Blood     (Negative)  


 


Ur Leukocyte Esterase     (Negative)  


 


Urine RBC     (0-5)  /hpf


 


Urine WBC     (0-5)  /hpf


 


Urine WBC Clumps     (None)  /hpf


 


Urine Bacteria     (None)  /hpf








                      Microbiology - Last 24 Hours (Table)











 07/15/19 03:35 Urine Culture - Preliminary





 Urine,Voided 














Assessment and Plan


Plan: 





Assessment and plan:


#1 Symptomatic bradycardia possibly related to hyperkalemia and beta blocker


#2 Acute kidney injury


#3 COPD exacerbation


#4 Hypertension


#5 Diabetes mellitus type 2 presented with hypo-glycemia


#6 Hyperlipidemia


#7 Paroxysmal atrial fibrillation








Plan


From cardiology's perspective, patient may be able to be discharged home once 

cleared by primary.  We'll make her a follow-up appointment with Dr. Gonzalez

in the office post discharge.





DNP note has been reviewed, I agree with a documented findings and plan of care.

 Patient was seen and examined.

## 2019-07-15 NOTE — PN
PROGRESS NOTE



Patient is seen for followup for acute kidney injury.  Patient's creatinine is

currently improved from 2.49 yesterday to 1.95.  She is not on any IV fluids on

diuretics.  The patient was initially volume overloaded.  She does have CKD with

baseline creatinine about 1.5-1.7 mg/dL.



PHYSICAL EXAMINATION:

On examination, blood pressure this morning 126/53.  Patient is afebrile.  Heart rate

66 per minute.  Examination of the heart S1, S2.  Examination of the lungs, bilateral

breath sounds are heard.  Abdomen is soft, nontender.  Examination of the lower

extremities shows trace edema bilaterally.  CNS exam grossly intact.



LABS:

Show hemoglobin 10.3, sodium 139, potassium 4.9, BUN 46, serum creatinine 1.95.



ASSESSMENT:

1. Acute kidney injury secondary to congestive heart failure, currently improving.

    Currently patient is not on any diuretics.  She denies any significant shortness of

    breath.

2. Chronic kidney disease from diabetic nephropathy.  Baseline creatinine 1.5 to 1.7.

3. 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

4. Mild hyperkalemia, currently improved.  This is associated with the RTA.



PLAN:

Continue to hold off on diuretics and IV fluids.  Repeat labs in a.m.





MMODL / IJN: [id number] / Job#: [id number]

## 2019-07-15 NOTE — XR
EXAMINATION TYPE: XR chest 2V

 

DATE OF EXAM: 7/15/2019

 

COMPARISON: Prior chest x-ray 7/12/2019

 

HISTORY: Dyspnea, shortness of breath

 

TECHNIQUE:  Frontal and lateral views of the chest are obtained.

 

FINDINGS:  Findings are similar to prior exam. No pneumothorax or pleural effusion. Right mid diaphys
eal clavicular fracture shows bayonet apposition and healed appearance. Heart remains enlarged. Aorta
 is dense.

 

IMPRESSION:  Cardiomegaly. Patient is rotated. There may be some basilar atelectasis. Suspect some im
provement in aeration.

## 2019-07-16 NOTE — P.PN
Subjective





Patient is seen in follow-up for acute kidney injury and chronic kidney disease.

 Renal function improved with creatinine at 1.95 as of yesterday.  Oral intake 

is good.  Denies chest pain or shortness of breath.  Good urine output.  





Vital signs are stable.


General: The patient appeared well nourished and normally developed. 


HEENT: Head exam is unremarkable. Neck is without jugular venous distension.


LUNGS: Lungs are clear to auscultation and percussion. Breath sounds decreased.


HEART: Rate and Rhythm are regular. First and second heart sounds normal. No 

murmurs, rubs or gallops. 


ABDOMEN: Abdominal exam reveals normal bowel sounds. Non-tender and non-

distended. No evidence of peritonitis.


EXTREMITITES: No clubbing, cyanosis, or edema.





Objective





- Vital Signs


Vital signs: 


                                   Vital Signs











Temp  97.9 F   07/16/19 06:16


 


Pulse  71   07/16/19 08:15


 


Resp  20   07/16/19 08:15


 


BP  174/78   07/16/19 06:16


 


Pulse Ox  97   07/16/19 08:15








                                 Intake & Output











 07/15/19 07/16/19 07/16/19





 18:59 06:59 18:59


 


Intake Total 720 300 


 


Output Total 1200  


 


Balance -480 300 


 


Intake:   


 


  Oral 720 300 


 


Output:   


 


  Urine 1200  


 


Other:   


 


  Voiding Method Bedside Commode  Bedside Commode





 Incontinent  Incontinent


 


  # Voids  2 


 


  # Bowel Movements 1  














- Labs


CBC & Chem 7: 


                                 07/15/19 05:58





                                 07/15/19 05:58


Labs: 


                  Abnormal Lab Results - Last 24 Hours (Table)











  07/14/19 07/15/19 07/15/19 Range/Units





  05:47 11:56 16:55 


 


POC Glucose (mg/dL)   147 H  262 H  (75-99)  mg/dL


 


Iron  20 L    ()  ug/dL


 


Iron Saturation  6.67 L    (12.00-45.00)   














  07/15/19 07/16/19 Range/Units





  20:56 07:09 


 


POC Glucose (mg/dL)  301 H  167 H  (75-99)  mg/dL


 


Iron    ()  ug/dL


 


Iron Saturation    (12.00-45.00)   








                      Microbiology - Last 24 Hours (Table)











 07/15/19 03:35 Urine Culture - Preliminary





 Urine,Voided 














Assessment and Plan


Plan: 





Assessment:


1.  Acute kidney injury mostly prerenal secondary to cardiorenal syndrome.  

Creatinine down to 1.95 as of yesterday.


2.  Chronic kidney disease stage III with baseline creatinine in the range of 

1.5-1.7 secondary to diabetic kidney disease.


3.  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  Maintained on 

oral sodium bicarbonate.


4.  Insulin-dependent diabetes mellitus.


5.  Hypertension with chronic kidney disease.  


6.  Diastolic CHF with moderate mitral regurgitation, moderate tricuspid 

regurgitation and severe pulmonary hypertension.  Currently compensated.





Plan:


Continue to hold off on diuretics at this time.


Patient to monitor her weight closely at home.


Follow-up outpatient in the next 1-2 weeks.

## 2019-07-16 NOTE — P.DS
Providers


Date of admission: 


07/12/19 12:13





Expected date of discharge: 07/16/19


Attending physician: 


Amy Rome MD





Consults: 





                                        





07/12/19 12:13


Consult Physician Routine 


   Consulting Provider: Raman Gonzalez


   Consult Reason/Comments: CHF, bradycardia


   Do you want consulting provider notified?: Yes





07/12/19 14:28


Consult Physician Routine 


   Consulting Provider: Bettye Sánchez


   Consult Reason/Comments: katie


   Do you want consulting provider notified?: Yes











Primary care physician: 


[facility] Course: 


Discharge Diagnosis:


Acute exacerbation of diastolic congestive heart failure with severe pulmonary 

hypertension


Upper respiratory tract infection


Acute kidney injury on chronic kidney disease stage III associated with type IV 

RTA


E. coli urinary tract infection-reviewed patient's outpatient urine culture 

results which show resistance to both Levaquin and Bactrim she will therefore 

complete an additional 7 days of Vantin.


Hyperthyroidism


Diabetes mellitus type 2 with both hypo-and hyperglycemia


Obesity with BMI 30.3


Chronic anemia


Atrial fibrillation with bradycardia


GERD


Anxiety





Hospital Course: 


Patient is a 78-year-old  female with a past medical history of atrial 

fibrillation, diabetes mellitus, hypertension, dyslipidemia, diabetes mellitus 

type 2, and chronic kidney disease stage III who presented to the emergency 

department with complaints of shortness of breath.  In the emergency department 

she underwent an extensive evaluation.  Her vital signs showed bradycardia on 

arrival with a pulse of 43.  Initial laboratory analysis showed her chronic 

kidney disease at baseline, metabolic acidosis, and slight hyperkalemia with a 

potassium of 5.2.  Her BNP was elevated at 8920.  And chest x-ray showed diffuse

pleural parenchymal changes consistent with CHF.  She was started on diuresis 

and admitted for further treatment of her CHF.  Cardiology was consulted and 

recommended continuation of her diuretic therapy, and stopping her metoprolol 

due to bradycardia.  She underwent an echocardiogram which showed moderate 

concentric LVH with ejection fraction 50-55%, and a restrictive LV filling 

pattern.  There is also moderate mitral regurgitation, moderate tricuspid 

regurgitation, and severe pulmonary hypertension with RVSP 78.65.  She started 

developing an increase in her creatinine with her diuresis.  Therefore her 

diuresis was held.  Nephrology was consulted.  She was noted to have a type IV 

RTA secondary to her diabetes and started on sodium bicarb.  This corrected her 

acidosis. Her renal function also improved. She was also noted to have 

hypoglycemic episodes during hospitalization receiving her home 70/30 dose.  On 

further review patient states she is also been having hypoglycemic episodes at 

home.  Her 7030 was therefore reduced to 10 units twice daily.  She'll follow up

with Dr. Moore.  She developed some upper respiratory type complaints and was 

started on Flonase, Claritin, and a Medrol Dosepak.  She is determined stable 

for discharge.  She will need follow-up for her hyperthyroidism that was 

diagnosed last hospital stay.  She'll follow up with Dr. Martinez in 1-2 days, Dr. Gonzalez in 2 weeks, and Dr. Moore in 1-2 weeks.  She will also follow-up with 

nephro 1-2 weeks regarding her type IV RTA.  She has plans to move to [facility] at the beginning of August.





Of note patient had a urinary tract infection prior to hospitalization.  Her 

urinalysis was repeated which showed continued infection.  Her outpatient urine 

culture was reviewed.  It is resistant to both Levaquin and Bactrim which she 

tried as an outpatient.  She was prescribed Vantin for an additional 7 days.  I 

do suggest that you repeat her urinalysis once her course of antibiotics has 

been completed.





Patient seen and examined at bedside.  No chest pain, still having a slight 

cough, breathing is much better, back pain is better, no nausea or vomiting.





Vital signs reviewed and stable. 


General: non toxic, no distress, appears at stated age


Derm: warm, dry


Head: atraumatic, normocephalic, symmetric


Eyes: EOMI, no lid lag, anicteric sclera


Mouth: no lip lesion, mucus membranes moist


Cardiovascular: S1S2 reg, no murmur, positive posterior tibial pulse bilateral, 


Lungs: CTA bilateral, no rhonchi, no rales , no accessory muscle use


Abdominal: soft,  nontender to palpation, no guarding, no appreciable 

organomegaly


Ext: no gross muscle atrophy, no edema, no contractures


Neuro:  CN II-XI grossly intact, no focal neuro deficits


Psych: Alert, oriented, appropriate affect 








A total of 35 minutes of time were spent preparing this complex discharge 

summary .





Pertinent Studies: 


Echocardiogram-ejection fraction 50-55%, restrictive LV pattern, severe 

pulmonary hypertension with RVSP 78.65, moderate MR, moderate TR





Patient Condition at Discharge: Stable





Plan - Discharge Summary


Discharge Rx Participant: No


New Discharge Prescriptions: 


New


   Loratadine [Claritin] 10 mg PO DAILY  tab


   Fluticasone Nasal Spray [Flonase Nasal Spray] 2 spray EA NOSTRIL DAILY  spr


   methylPREDNISolone Dose Pack [Medrol Dose Pack] 24 mg PO DAILY #1 pack


   amLODIPine [Norvasc] 5 mg PO DAILY #30 tab


   Sodium Bicarbonate Tab 650 mg PO QID #120 tab


   Cefpodoxime Proxetil [Vantin] 200 mg PO Q12HR #14 tab





Continue


   Simvastatin [Zocor] 40 mg PO DAILY


   LORazepam [Ativan] 0.5 mg PO HS PRN


     PRN Reason: Anxiety


   Timolol [Betimol 0.5% Ophth Soln] 1 drop BOTH EYES DAILY


   Magnesium 200 mg PO DAILY


   Pioglitazone HCl [Actos] 15 mg PO DAILY


   Pantoprazole [Protonix] 40 mg PO BID


   Apixaban [Eliquis] 5 mg PO BID #60 tab


   Methimazole [Tapazole] 5 mg PO DAILY #30 tab





Changed


   Insulin NPH Hum/Reg Insulin Hm [NovoLIN 70-30 100 UNIT/ML VIAL] 10 unit SQ 

QAM #0


   Insulin NPH Hum/Reg Insulin Hm [NovoLIN 70-30 100 UNIT/ML VIAL] 10 unit SQ HS

#0





Discontinued


   Metoprolol Tartrate [Lopressor] 50 mg PO BID #60 tab


Discharge Medication List





Simvastatin [Zocor] 40 mg PO DAILY 05/30/14 [History]


LORazepam [Ativan] 0.5 mg PO HS PRN 02/21/17 [History]


Timolol [Betimol 0.5% Ophth Soln] 1 drop BOTH EYES DAILY 03/13/17 [History]


Magnesium 200 mg PO DAILY 06/17/19 [History]


Pantoprazole [Protonix] 40 mg PO BID 06/17/19 [History]


Pioglitazone HCl [Actos] 15 mg PO DAILY 06/17/19 [History]


Apixaban [Eliquis] 5 mg PO BID #60 tab 06/19/19 [Rx]


Methimazole [Tapazole] 5 mg PO DAILY #30 tab 06/19/19 [Rx]


Cefpodoxime Proxetil [Vantin] 200 mg PO Q12HR #14 tab 07/16/19 [Rx]


Fluticasone Nasal Spray [Flonase Nasal Spray] 2 spray EA NOSTRIL DAILY  spr 

07/16/19 [Rx]


Insulin NPH Hum/Reg Insulin Hm [NovoLIN 70-30 100 UNIT/ML VIAL] 10 unit SQ HS #0

07/16/19 [Rx]


Insulin NPH Hum/Reg Insulin Hm [NovoLIN 70-30 100 UNIT/ML VIAL] 10 unit SQ QAM #

0 07/16/19 [Rx]


Loratadine [Claritin] 10 mg PO DAILY  tab 07/16/19 [Rx]


Sodium Bicarbonate Tab 650 mg PO QID #120 tab 07/16/19 [Rx]


amLODIPine [Norvasc] 5 mg PO DAILY #30 tab 07/16/19 [Rx]


methylPREDNISolone Dose Pack [Medrol Dose Pack] 24 mg PO DAILY #1 pack 07/16/19 

[Rx]








Follow up Appointment(s)/Referral(s): 


Mckay Martinez MD [Primary Care Provider] - 1-2 days

## 2019-09-16 ENCOUNTER — HOSPITAL ENCOUNTER (OUTPATIENT)
Dept: HOSPITAL 47 - RADMAMWWP | Age: 79
Discharge: HOME | End: 2019-09-16
Attending: OBSTETRICS & GYNECOLOGY
Payer: MEDICARE

## 2019-09-16 DIAGNOSIS — Z12.31: Primary | ICD-10-CM

## 2019-09-16 PROCEDURE — 77063 BREAST TOMOSYNTHESIS BI: CPT

## 2019-09-16 PROCEDURE — 77067 SCR MAMMO BI INCL CAD: CPT

## 2019-09-18 NOTE — MM
Reason for exam: screening  (asymptomatic).

Last mammogram was performed 1 year and 11 months ago.



History:

Patient is postmenopausal.

Benign US left guided VAD of the left breast, February 26, 2010.

Took estrogen for 10 years beginning at age 54.



Physical Findings:

A clinical breast exam by your physician is recommended on an annual basis and 

results should be correlated with mammographic findings.



MG 3D Screening Mammo W/Cad

Bilateral CC and MLO view(s) were taken.

Prior study comparison: October 3, 2017, bilateral MG 3d screening mammo w/cad.  

September 8, 2016, bilateral MG 3d screening mammo w/cad.

There are scattered fibroglandular densities.  Previous mammotome biopsy in the 

left breast.  No significant changes when compared with prior studies.





ASSESSMENT: Negative, BI-RAD 1



RECOMMENDATION:

Routine screening mammogram of both breasts in 1 year.

## 2019-09-22 ENCOUNTER — HOSPITAL ENCOUNTER (INPATIENT)
Dept: HOSPITAL 47 - EC | Age: 79
LOS: 3 days | Discharge: HOME | DRG: 243 | End: 2019-09-25
Attending: INTERNAL MEDICINE | Admitting: INTERNAL MEDICINE
Payer: MEDICARE

## 2019-09-22 VITALS — BODY MASS INDEX: 30.5 KG/M2

## 2019-09-22 DIAGNOSIS — E03.9: ICD-10-CM

## 2019-09-22 DIAGNOSIS — Z79.899: ICD-10-CM

## 2019-09-22 DIAGNOSIS — I49.5: Primary | ICD-10-CM

## 2019-09-22 DIAGNOSIS — Z80.49: ICD-10-CM

## 2019-09-22 DIAGNOSIS — F32.9: ICD-10-CM

## 2019-09-22 DIAGNOSIS — Z98.42: ICD-10-CM

## 2019-09-22 DIAGNOSIS — Z98.41: ICD-10-CM

## 2019-09-22 DIAGNOSIS — Z87.891: ICD-10-CM

## 2019-09-22 DIAGNOSIS — D72.829: ICD-10-CM

## 2019-09-22 DIAGNOSIS — N18.4: ICD-10-CM

## 2019-09-22 DIAGNOSIS — I27.20: ICD-10-CM

## 2019-09-22 DIAGNOSIS — Z79.4: ICD-10-CM

## 2019-09-22 DIAGNOSIS — E78.5: ICD-10-CM

## 2019-09-22 DIAGNOSIS — N25.89: ICD-10-CM

## 2019-09-22 DIAGNOSIS — Z87.440: ICD-10-CM

## 2019-09-22 DIAGNOSIS — F41.9: ICD-10-CM

## 2019-09-22 DIAGNOSIS — E87.5: ICD-10-CM

## 2019-09-22 DIAGNOSIS — I48.0: ICD-10-CM

## 2019-09-22 DIAGNOSIS — N17.9: ICD-10-CM

## 2019-09-22 DIAGNOSIS — E11.22: ICD-10-CM

## 2019-09-22 DIAGNOSIS — Z79.01: ICD-10-CM

## 2019-09-22 DIAGNOSIS — Z80.1: ICD-10-CM

## 2019-09-22 DIAGNOSIS — I50.32: ICD-10-CM

## 2019-09-22 DIAGNOSIS — E11.21: ICD-10-CM

## 2019-09-22 DIAGNOSIS — I13.0: ICD-10-CM

## 2019-09-22 DIAGNOSIS — H40.9: ICD-10-CM

## 2019-09-22 DIAGNOSIS — D64.9: ICD-10-CM

## 2019-09-22 DIAGNOSIS — K21.0: ICD-10-CM

## 2019-09-22 DIAGNOSIS — E83.42: ICD-10-CM

## 2019-09-22 DIAGNOSIS — E11.65: ICD-10-CM

## 2019-09-22 DIAGNOSIS — E66.9: ICD-10-CM

## 2019-09-22 DIAGNOSIS — R79.1: ICD-10-CM

## 2019-09-22 DIAGNOSIS — E87.2: ICD-10-CM

## 2019-09-22 DIAGNOSIS — T45.515A: ICD-10-CM

## 2019-09-22 DIAGNOSIS — E05.90: ICD-10-CM

## 2019-09-22 LAB
ALBUMIN SERPL-MCNC: 4.3 G/DL (ref 3.5–5)
ALP SERPL-CCNC: 121 U/L (ref 38–126)
ALT SERPL-CCNC: 15 U/L (ref 9–52)
ANION GAP SERPL CALC-SCNC: 12 MMOL/L
ANION GAP SERPL CALC-SCNC: 12 MMOL/L
APTT BLD: 23.5 SEC (ref 22–30)
AST SERPL-CCNC: 28 U/L (ref 14–36)
BASOPHILS # BLD AUTO: 0.1 K/UL (ref 0–0.2)
BASOPHILS NFR BLD AUTO: 1 %
BUN SERPL-SCNC: 63 MG/DL (ref 7–17)
BUN SERPL-SCNC: 70 MG/DL (ref 7–17)
CALCIUM SPEC-MCNC: 9.4 MG/DL (ref 8.4–10.2)
CALCIUM SPEC-MCNC: 9.9 MG/DL (ref 8.4–10.2)
CHLORIDE SERPL-SCNC: 99 MMOL/L (ref 98–107)
CHLORIDE SERPL-SCNC: 99 MMOL/L (ref 98–107)
CK SERPL-CCNC: 29 U/L (ref 30–135)
CO2 SERPL-SCNC: 22 MMOL/L (ref 22–30)
CO2 SERPL-SCNC: 25 MMOL/L (ref 22–30)
EOSINOPHIL # BLD AUTO: 0.3 K/UL (ref 0–0.7)
EOSINOPHIL NFR BLD AUTO: 2 %
ERYTHROCYTE [DISTWIDTH] IN BLOOD BY AUTOMATED COUNT: 6.78 M/UL (ref 3.8–5.4)
ERYTHROCYTE [DISTWIDTH] IN BLOOD: 17.3 % (ref 11.5–15.5)
GLUCOSE BLD-MCNC: 103 MG/DL (ref 75–99)
GLUCOSE BLD-MCNC: 156 MG/DL (ref 75–99)
GLUCOSE BLD-MCNC: 251 MG/DL (ref 75–99)
GLUCOSE SERPL-MCNC: 180 MG/DL (ref 74–99)
GLUCOSE SERPL-MCNC: 277 MG/DL (ref 74–99)
HCT VFR BLD AUTO: 45.8 % (ref 34–46)
HGB BLD-MCNC: 13.8 GM/DL (ref 11.4–16)
HYALINE CASTS UR QL AUTO: 11 /LPF (ref 0–2)
INR PPP: 1.2 (ref ?–1.2)
LYMPHOCYTES # SPEC AUTO: 3.7 K/UL (ref 1–4.8)
LYMPHOCYTES NFR SPEC AUTO: 29 %
MAGNESIUM SPEC-SCNC: 1.5 MG/DL (ref 1.6–2.3)
MCH RBC QN AUTO: 20.3 PG (ref 25–35)
MCHC RBC AUTO-ENTMCNC: 30.1 G/DL (ref 31–37)
MCV RBC AUTO: 67.6 FL (ref 80–100)
MONOCYTES # BLD AUTO: 0.7 K/UL (ref 0–1)
MONOCYTES NFR BLD AUTO: 5 %
NEUTROPHILS # BLD AUTO: 7.9 K/UL (ref 1.3–7.7)
NEUTROPHILS NFR BLD AUTO: 62 %
PH UR: 5 [PH] (ref 5–8)
PLATELET # BLD AUTO: 285 K/UL (ref 150–450)
POTASSIUM SERPL-SCNC: 4.7 MMOL/L (ref 3.5–5.1)
POTASSIUM SERPL-SCNC: 5.9 MMOL/L (ref 3.5–5.1)
PROT SERPL-MCNC: 7.9 G/DL (ref 6.3–8.2)
PT BLD: 12.5 SEC (ref 9–12)
RBC UR QL: <1 /HPF (ref 0–5)
SODIUM SERPL-SCNC: 133 MMOL/L (ref 137–145)
SODIUM SERPL-SCNC: 136 MMOL/L (ref 137–145)
SP GR UR: 1.01 (ref 1–1.03)
SQUAMOUS UR QL AUTO: <1 /HPF (ref 0–4)
UROBILINOGEN UR QL STRIP: <2 MG/DL (ref ?–2)
WBC # BLD AUTO: 12.8 K/UL (ref 3.8–10.6)
WBC #/AREA URNS HPF: 2 /HPF (ref 0–5)

## 2019-09-22 PROCEDURE — 80053 COMPREHEN METABOLIC PANEL: CPT

## 2019-09-22 PROCEDURE — 85730 THROMBOPLASTIN TIME PARTIAL: CPT

## 2019-09-22 PROCEDURE — 94760 N-INVAS EAR/PLS OXIMETRY 1: CPT

## 2019-09-22 PROCEDURE — 96374 THER/PROPH/DIAG INJ IV PUSH: CPT

## 2019-09-22 PROCEDURE — 85027 COMPLETE CBC AUTOMATED: CPT

## 2019-09-22 PROCEDURE — 85025 COMPLETE CBC W/AUTO DIFF WBC: CPT

## 2019-09-22 PROCEDURE — 83880 ASSAY OF NATRIURETIC PEPTIDE: CPT

## 2019-09-22 PROCEDURE — 84484 ASSAY OF TROPONIN QUANT: CPT

## 2019-09-22 PROCEDURE — 84100 ASSAY OF PHOSPHORUS: CPT

## 2019-09-22 PROCEDURE — 96361 HYDRATE IV INFUSION ADD-ON: CPT

## 2019-09-22 PROCEDURE — 83605 ASSAY OF LACTIC ACID: CPT

## 2019-09-22 PROCEDURE — 80061 LIPID PANEL: CPT

## 2019-09-22 PROCEDURE — 33208 INSRT HEART PM ATRIAL & VENT: CPT

## 2019-09-22 PROCEDURE — 83036 HEMOGLOBIN GLYCOSYLATED A1C: CPT

## 2019-09-22 PROCEDURE — 81001 URINALYSIS AUTO W/SCOPE: CPT

## 2019-09-22 PROCEDURE — 93005 ELECTROCARDIOGRAM TRACING: CPT

## 2019-09-22 PROCEDURE — 85610 PROTHROMBIN TIME: CPT

## 2019-09-22 PROCEDURE — 84443 ASSAY THYROID STIM HORMONE: CPT

## 2019-09-22 PROCEDURE — 36415 COLL VENOUS BLD VENIPUNCTURE: CPT

## 2019-09-22 PROCEDURE — 83735 ASSAY OF MAGNESIUM: CPT

## 2019-09-22 PROCEDURE — 80048 BASIC METABOLIC PNL TOTAL CA: CPT

## 2019-09-22 PROCEDURE — 71046 X-RAY EXAM CHEST 2 VIEWS: CPT

## 2019-09-22 PROCEDURE — 99291 CRITICAL CARE FIRST HOUR: CPT

## 2019-09-22 PROCEDURE — 82550 ASSAY OF CK (CPK): CPT

## 2019-09-22 RX ADMIN — INSULIN ASPART SCH UNIT: 100 INJECTION, SUSPENSION SUBCUTANEOUS at 20:34

## 2019-09-22 RX ADMIN — INSULIN ASPART SCH UNIT: 100 INJECTION, SOLUTION INTRAVENOUS; SUBCUTANEOUS at 20:33

## 2019-09-22 RX ADMIN — Medication PRN MG: at 23:38

## 2019-09-22 RX ADMIN — Medication SCH MG: at 17:29

## 2019-09-22 RX ADMIN — PANTOPRAZOLE SODIUM SCH MG: 40 TABLET, DELAYED RELEASE ORAL at 17:29

## 2019-09-22 RX ADMIN — Medication SCH MG: at 20:33

## 2019-09-22 RX ADMIN — CEFAZOLIN SCH: 330 INJECTION, POWDER, FOR SOLUTION INTRAMUSCULAR; INTRAVENOUS at 16:52

## 2019-09-22 RX ADMIN — INSULIN ASPART SCH: 100 INJECTION, SOLUTION INTRAVENOUS; SUBCUTANEOUS at 17:29

## 2019-09-22 NOTE — XR
EXAMINATION TYPE: XR chest 2V

 

DATE OF EXAM: 9/22/2019

 

COMPARISON: 7/15/2019

 

HISTORY: Weakness short of breath

 

TECHNIQUE:  Frontal and lateral views of the chest are obtained.

 

FINDINGS:  Heart is enlarged. There is small linear density in the left midlung. There is no heart fa
ilure. There are chest leads. Trachea is midline. There is no pleural effusion.

 

IMPRESSION:  Cardiomegaly. Subsegmental atelectasis in the left lower lobe. No heart failure. There i
s clearing of the mild pulmonary congestion compared to old exam.

## 2019-09-22 NOTE — ED
Syncope HPI





- General


Chief Complaint: Syncope


Stated Complaint: Dizziness


Time Seen by Provider: 19 14:12


Source: patient, EMS, RN notes reviewed, old records reviewed


Mode of arrival: EMS





- History of Present Illness


Initial Comments: 





This is an 79-year-old female to the ER for evaluation.  Patient is today for 

evaluation of syncopal event.  Patient states she feels lightheaded and weak.  

She feels near syncopal currently here.  Patient denies headache chest pain shor

tness of breath or abdominal pain.  A she does not believe she fully lost 

consciousness.  Patient has history of heart disease atrial fibrillation.  No 

recent change in medications


MD Complaint: loss of consciousness, felt faint, almost passed out


-: hour(s)


Prodromal Symptoms: none


-: second(s)


Witnessed: yes - by bystander


Injuries Sustained Associated with Event: None


Current Symptoms: lightheaded, weakness


History: previous syncopal episode


Context: during exertion


Treatments Prior to Arrival: none





- Related Data


                                Home Medications











 Medication  Instructions  Recorded  Confirmed


 


Simvastatin [Zocor] 40 mg PO DAILY 14


 


LORazepam [Ativan] 0.5 mg PO HS PRN 17


 


Timolol [Betimol 0.5% Ophth Soln] 1 drop BOTH EYES DAILY 17


 


Magnesium 200 mg PO DAILY 19


 


Pantoprazole [Protonix] 40 mg PO BID 19


 


Pioglitazone HCl [Actos] 15 mg PO DAILY 19


 


Diltiazem HCl [Diltiazem ER] 240 mg PO DAILY 19


 


Insulin NPH Hum/Reg Insulin Hm 10 unit SQ BID 19





[NovoLIN 70-30 100 UNIT/ML VIAL]   


 


Spironolactone 25 mg PO DAILY 19








                                  Previous Rx's











 Medication  Instructions  Recorded


 


Apixaban [Eliquis] 5 mg PO BID #60 tab 19


 


Methimazole [Tapazole] 5 mg PO DAILY #30 tab 19


 


Sodium Bicarbonate Tab 650 mg PO QID #120 tab 19











                                    Allergies











Allergy/AdvReac Type Severity Reaction Status Date / Time


 


latex Allergy  Rash/Hives Verified 19 14:15














Review of Systems


ROS Statement: 


Those systems with pertinent positive or pertinent negative responses have been 

documented in the HPI.





ROS Other: All systems not noted in ROS Statement are negative.





Past Medical History


Past Medical History: Atrial Fibrillation, Diabetes Mellitus, Eye Disorder, 

GERD/Reflux, Hyperlipidemia, Hypertension, Osteoarthritis (OA), Renal Disease, 

Thyroid Disorder


Additional Past Medical History / Comment(s): Pt admitted on 19 with new 

onset afib/hyperthyroidism/KATHERINE/hyperkalemia/hypomagnesemia/echo was 55-60%.     

  Other hx:  16 with idiopathic pancreatitis, esophagitis, gastritis, 

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 

problems, arthritis in back, chronic R shoulder pain since fall/fracture 1 year 

ago,  prone to UTIs, bilateral glaucoma with surgery.


History of Any Multi-Drug Resistant Organisms: None Reported


Past Surgical History: Cholecystectomy, Heart Catheterization, Tonsillectomy, 

Tubal Ligation


Additional Past Surgical History / Comment(s): EGD with bx 16,   2010 

cardiac cath-normal, bilateral cataract removal, bilateral eye surgery for 

glaucoma, colonoscopy.


Past Anesthesia/Blood Transfusion Reactions: No Reported Reaction


Additional Past Anesthesia/Blood Transfusion Reaction / Comment(s): Pt has 

clausterphobia.


Past Psychological History: Anxiety, Depression


Smoking Status: Former smoker


Past Alcohol Use History: None Reported


Past Drug Use History: None Reported





- Past Family History


  ** Father


Family Medical History: Cancer


Additional Family Medical History / Comment(s): Father  at the age of 81 yrs

 of lung cancer.  He was a smoker.





  ** Mother


Family Medical History: Cancer


Additional Family Medical History / Comment(s): Mother had cervical cancer.  She

  at the age of 53 from her lupus.





General Exam


General appearance: alert, in no apparent distress


Head exam: Present: atraumatic, normocephalic, normal inspection


Eye exam: Present: normal appearance, PERRL, EOMI.  Absent: scleral icterus, 

conjunctival injection, periorbital swelling


ENT exam: Present: normal exam, mucous membranes moist


Neck exam: Present: normal inspection.  Absent: tenderness, meningismus, 

lymphadenopathy


Respiratory exam: Present: normal lung sounds bilaterally.  Absent: respiratory 

distress, wheezes, rales, rhonchi, stridor


Cardiovascular Exam: Present: bradycardia, normal heart sounds.  Absent: 

systolic murmur, diastolic murmur, rubs, gallop, clicks


GI/Abdominal exam: Present: soft, normal bowel sounds.  Absent: distended, 

tenderness, guarding, rebound, rigid


Extremities exam: Present: normal inspection, full ROM, normal capillary refill.

  Absent: tenderness, pedal edema, joint swelling, calf tenderness


Back exam: Present: normal inspection


Neurological exam: Present: alert, oriented X3, CN II-XII intact


Psychiatric exam: Present: normal affect, normal mood


Skin exam: Present: warm, dry, intact, normal color.  Absent: rash





Course


                                   Vital Signs











  19





  14:14 14:45 15:00


 


Temperature 97.7 F  


 


Pulse Rate 41 L 42 L 60


 


Respiratory 20 18 18





Rate   


 


Blood Pressure 122/54 124/72 122/62


 


O2 Sat by Pulse 98 95 97





Oximetry   














- Reevaluation(s)


Reevaluation #1: 





19 15:35


Medical record is reviewed does have history of A. fib does also have history of

 bradycardia


Reevaluation #2: 





19 15:35


Patient's bradycardia did respond to atropine from the low 40s to 60


Reevaluation #3: 





19 15:35


Recurrent syncopal episode here in the ER





- Consultations


Consultation #1: 





Brandon Christian





EKG Findings





- EKG Comments:


EKG Findings:: EKG shows sinus bradycardia rate of 40, , QRS 86, QTc 397





Medical Decision Making





- Medical Decision Making





79-year-old female the ER for evaluation history of heart disease, we'll hold 

all rate lowering Medications.  Patient is to be admitted for syncopal event 

bradycardia or near syncopal event bradycardia.  Patient has no complaints of 

pain.  We'll treat elevated potassium





- Lab Data


Result diagrams: 


                                 19 14:29





                                 19 14:29


                                   Lab Results











  19 Range/Units





  14:26 14:29 14:29 


 


WBC   12.8 H   (3.8-10.6)  k/uL


 


RBC   6.78 H   (3.80-5.40)  m/uL


 


Hgb   13.8   (11.4-16.0)  gm/dL


 


Hct   45.8   (34.0-46.0)  %


 


MCV   67.6 L   (80.0-100.0)  fL


 


MCH   20.3 L   (25.0-35.0)  pg


 


MCHC   30.1 L   (31.0-37.0)  g/dL


 


RDW   17.3 H   (11.5-15.5)  %


 


Plt Count   285   (150-450)  k/uL


 


Neutrophils %   62   %


 


Lymphocytes %   29   %


 


Monocytes %   5   %


 


Eosinophils %   2   %


 


Basophils %   1   %


 


Neutrophils #   7.9 H   (1.3-7.7)  k/uL


 


Lymphocytes #   3.7   (1.0-4.8)  k/uL


 


Monocytes #   0.7   (0-1.0)  k/uL


 


Eosinophils #   0.3   (0-0.7)  k/uL


 


Basophils #   0.1   (0-0.2)  k/uL


 


Hypochromasia   Moderate   


 


Anisocytosis   Slight   


 


Microcytosis   Marked   


 


PT     (9.0-12.0)  sec


 


INR     (<1.2)  


 


APTT     (22.0-30.0)  sec


 


Sodium    133 L  (137-145)  mmol/L


 


Potassium    5.9 H  (3.5-5.1)  mmol/L


 


Chloride    99  ()  mmol/L


 


Carbon Dioxide    22  (22-30)  mmol/L


 


Anion Gap    12  mmol/L


 


BUN    70 H  (7-17)  mg/dL


 


Creatinine    1.67 H  (0.52-1.04)  mg/dL


 


Est GFR (CKD-EPI)AfAm    33  (>60 ml/min/1.73 sqM)  


 


Est GFR (CKD-EPI)NonAf    29  (>60 ml/min/1.73 sqM)  


 


Glucose    277 H  (74-99)  mg/dL


 


POC Glucose (mg/dL)  251 H    (75-99)  mg/dL


 


POC Glu Operater ID  Lilliam Kellogg    


 


Plasma Lactic Acid Aleks     (0.7-2.0)  mmol/L


 


Calcium    9.9  (8.4-10.2)  mg/dL


 


Phosphorus    4.3  (2.5-4.5)  mg/dL


 


Magnesium    1.5 L  (1.6-2.3)  mg/dL


 


Total Bilirubin    1.0  (0.2-1.3)  mg/dL


 


AST    28  (14-36)  U/L


 


ALT    15  (9-52)  U/L


 


Alkaline Phosphatase    121  ()  U/L


 


Creatine Kinase    29 L  ()  U/L


 


NT-Pro-B Natriuret Pep     pg/mL


 


Total Protein    7.9  (6.3-8.2)  g/dL


 


Albumin    4.3  (3.5-5.0)  g/dL


 


TSH    6.890 H  (0.465-4.680)  mIU/L














  19 Range/Units





  14:29 14:29 14:29 


 


WBC     (3.8-10.6)  k/uL


 


RBC     (3.80-5.40)  m/uL


 


Hgb     (11.4-16.0)  gm/dL


 


Hct     (34.0-46.0)  %


 


MCV     (80.0-100.0)  fL


 


MCH     (25.0-35.0)  pg


 


MCHC     (31.0-37.0)  g/dL


 


RDW     (11.5-15.5)  %


 


Plt Count     (150-450)  k/uL


 


Neutrophils %     %


 


Lymphocytes %     %


 


Monocytes %     %


 


Eosinophils %     %


 


Basophils %     %


 


Neutrophils #     (1.3-7.7)  k/uL


 


Lymphocytes #     (1.0-4.8)  k/uL


 


Monocytes #     (0-1.0)  k/uL


 


Eosinophils #     (0-0.7)  k/uL


 


Basophils #     (0-0.2)  k/uL


 


Hypochromasia     


 


Anisocytosis     


 


Microcytosis     


 


PT    12.5 H  (9.0-12.0)  sec


 


INR    1.2 H  (<1.2)  


 


APTT    23.5  (22.0-30.0)  sec


 


Sodium     (137-145)  mmol/L


 


Potassium     (3.5-5.1)  mmol/L


 


Chloride     ()  mmol/L


 


Carbon Dioxide     (22-30)  mmol/L


 


Anion Gap     mmol/L


 


BUN     (7-17)  mg/dL


 


Creatinine     (0.52-1.04)  mg/dL


 


Est GFR (CKD-EPI)AfAm     (>60 ml/min/1.73 sqM)  


 


Est GFR (CKD-EPI)NonAf     (>60 ml/min/1.73 sqM)  


 


Glucose     (74-99)  mg/dL


 


POC Glucose (mg/dL)     (75-99)  mg/dL


 


POC Glu Operater ID     


 


Plasma Lactic Acid Aleks  2.2 H*    (0.7-2.0)  mmol/L


 


Calcium     (8.4-10.2)  mg/dL


 


Phosphorus     (2.5-4.5)  mg/dL


 


Magnesium     (1.6-2.3)  mg/dL


 


Total Bilirubin     (0.2-1.3)  mg/dL


 


AST     (14-36)  U/L


 


ALT     (9-52)  U/L


 


Alkaline Phosphatase     ()  U/L


 


Creatine Kinase     ()  U/L


 


NT-Pro-B Natriuret Pep   2180   pg/mL


 


Total Protein     (6.3-8.2)  g/dL


 


Albumin     (3.5-5.0)  g/dL


 


TSH     (0.465-4.680)  mIU/L














- Radiology Data


Radiology results: report reviewed (Chest x-rays negative for acute disease), 

image reviewed





Critical Care Time


Critical Care Time: Yes


Total Critical Care Time: 31





Disposition


Clinical Impression: 


 Bradycardia, Syncope





Disposition: ADMITTED AS IP TO THIS [facility]


Condition: Serious


Is patient prescribed a controlled substance at d/c from ED?: No


Referrals: 


Mckay Martinez MD [Primary Care Provider] - 1-2 days

## 2019-09-22 NOTE — P.HPIM
History of Present Illness


H&P Date: 19


Chief Complaint: presyncope 


Patient is an 79-year-old  female with a past medical history of atrial

fibrillation with prior bradycardic events, hyperthyroidism on methimazole, 

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 

diastolic congestive heart failure with severe pulmonary hypertension who 

presented to the ER via EMS secondary to presyncope.  She had been eating lunch 

at her independent living and started feeling faint and as though she would pass

out.  In the ER she underwent an extensive evaluation.  On arrival her vital sig

ns were within normal limits.  Initial laboratory analysis showed an elevated 

white blood cell count 12.8, INR 1.2, sodium 133, potassium 5.9, creatinine 

1.67.  BNP was slightly elevated at 2180 however is lower than her normal, 

troponin was negative, TSH was 6.89.  Initial urinalysis was negative.  EKG 

demonstrated sinus bradycardia.  She was found have a heart rate in the 40s and 

given 1 dose of atropine with increase of heart rate to the 60s. She was given 

treatment for her hyperkalemia with insulin, glucose, calcium gluconate, and 

kayexelate. She'll be admitted for further management of her presyncope and 

sinus bradycardia.





Patient seen and examined at bedside in the emergency department.  She appears 

very distraught.  She feels.  Confused about what medications to take and how to

take them.  She states she has been struggling with extreme fatigue over the 

last several months.  She's been sleeping 12-15 hours a day.  She reports that 

today she went down and ate lunch.  She then became clammy, woozy, and felt as 

though she would pass out.  She denies any true loss of consciousness.  She 

states that when she left the hospital in July she was taking NovoLog 70/30 10 

units in the morning and 10 at night.  She then noted blood sugars in the 500s 

and called Dr. Moore's office.  She was told to take 40 units in the morning and 

30 units at night.  This was her first dose of 40 units prior to lunch.  She has

chronic shortness of breath with exertion which is unchanged.  She reports that 

her appetite has been intact and she has been eating and drinking well.  She 

does report that she is still having bowel movements daily although feels harder

to have bowel movement.





On her last admission in 2019 she was found have sinus bradycardia at that 

point in time she was taken off of metoprolol and started on Norvasc.  She 

reports following with cardiology and starting on diltiazem.  She is unsure why 

this was changed.  She does state she was having increased shortness of breath 

with exertion at that time.  She is also unsure why she is on spironolactone.  

However she is able to tell me she was having some increased lower extremity 

edema, and logically that makes sense to have added spironolactone.  She went 

that she is due to see Dr. Moore .  She was seen very confused about her 

medications after they have all changed recently.  She denies having home health

care nurse come out.  She would be interested in having a home health care 

nurse.





She states she does have a living will or advanced directive.  It Says not to 

proceed with things like CPR or intubation should there be no hope of recovery. 

At this point in time she would like to proceed with CPR or intubation for the 

short-term.  If she was unable to come of the vent or regain significant 

function she would not want to be kept alive long-term on machines








Review of Systems


Pertinent positives and negatives as discussed in HPI, a complete review of 

systems was performed and all other systems are negative.








Past Medical History


Past Medical History: Atrial Fibrillation, Diabetes Mellitus, Eye Disorder, 

GERD/Reflux, Hyperlipidemia, Hypertension, Osteoarthritis (OA), Renal Disease, 

Thyroid Disorder


Additional Past Medical History / Comment(s): afib/hyperthyroidism/echo was 55-

60%. Idiopathic pancreatitis, esophagitis, gastritis, hiatal hernia,  IDDM type 

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 

back, chronic R shoulder pain since fall/fracture,  prone to UTIs, bilateral 

glaucoma with surgery.  Severe pulmonary hypertension, hyperthyroidism.  Type IV

renal tubular acidosis. Chronic anemia.


History of Any Multi-Drug Resistant Organisms: None Reported


Past Surgical History: Cholecystectomy, Heart Catheterization, Tonsillectomy, 

Tubal Ligation


Additional Past Surgical History / Comment(s): EGD with bx 17,   2010 

cardiac cath-normal, bilateral cataract removal, bilateral eye surgery for 

glaucoma, colonoscopy.


Past Anesthesia/Blood Transfusion Reactions: No Reported Reaction


Additional Past Anesthesia/Blood Transfusion Reaction / Comment(s): Pt has 

clausterphobia.


Past Psychological History: Anxiety, Depression


Smoking Status: Former smoker


Past Alcohol Use History: None Reported


Past Drug Use History: None Reported


Additional History: Lives at [facility].  Shee uses a cane or walker for 

long distances.





- Past Family History


  ** Father


Family Medical History: Cancer


Additional Family Medical History / Comment(s): Father  at the age of 81 yrs

of lung cancer.  He was a smoker.





  ** Mother


Family Medical History: Cancer


Additional Family Medical History / Comment(s): Mother had cervical cancer.  She

 at the age of 53 from her lupus.





Medications and Allergies


                                Home Medications











 Medication  Instructions  Recorded  Confirmed  Type


 


Simvastatin [Zocor] 40 mg PO DAILY 14 History


 


LORazepam [Ativan] 0.5 mg PO HS PRN 17 History


 


Timolol [Betimol 0.5% Ophth Soln] 1 drop BOTH EYES DAILY 17 

History


 


Magnesium 200 mg PO DAILY 19 History


 


Pantoprazole [Protonix] 40 mg PO BID 19 History


 


Pioglitazone HCl [Actos] 15 mg PO DAILY 19 History


 


Methimazole [Tapazole] 5 mg PO DAILY #30 tab 19 Rx


 


Sodium Bicarbonate Tab 650 mg PO QID #120 tab 19 Rx


 


Diltiazem HCl [Diltiazem ER] 240 mg PO DAILY 19 History


 


Furosemide [Lasix] 40 mg PO DAILY 19 History


 


Insulin NPH Hum/Reg Insulin Hm 10 unit SQ BID 19 History





[NovoLIN 70-30 100 UNIT/ML VIAL]    


 


Spironolactone 25 mg PO DAILY 19 History


 


Warfarin [Coumadin] 2.5 mg PO HS 19 History








                                    Allergies











Allergy/AdvReac Type Severity Reaction Status Date / Time


 


latex Allergy  Rash/Hives Verified 19 14:15














Physical Exam


Osteopathic Statement: *.  No significant issues noted on an osteopathic 

structural exam other than those noted in the History and Physical/Consult.


Vitals: 


                                   Vital Signs











  Temp Pulse Resp BP Pulse Ox


 


 19 15:58  97.7 F  50 L  18  129/79  98


 


 19 15:49   50 L  18  129/79  98


 


 19 15:00   60  18  122/62  97


 


 19 14:45   42 L  18  124/72  95


 


 19 14:14  97.7 F  41 L  20  122/54  98








                                Intake and Output











 19





 06:59 14:59 22:59


 


Other:   


 


  Weight  77.111 kg 











General: non toxic, no distress, appears older than stated age, Obese


Derm: no unusual rashes/lesions no unusual ecchymoses, warm, dry


Head: atraumatic, normocephalic, symmetric


Eyes: EOMI, no lid lag, anicteric sclera, pupils equal round reactive to light


ENT: Nose and ears atraumatic, no thrush,  no pharyngeal erythema


Neck: No thyromegaly, no cervical lymphadenopathy, trachea midline, supple


Mouth: no lip lesion, mucus membranes moist


Cardiovascular: S1S2 irreg, no murmur, positive posterior tibial pulse 

bilateral, trace edema, capillary refill less than 2 seconds


Lungs: Decreased bilateral, no rhonchi, no rales , no accessory muscle use


Abdominal: soft,  nontender to palpation, no guarding, no appreciable 

organomegaly, normal bowel sounds


Ext: no gross muscle atrophy,  muscle strength 5 out of 5 in all 4 extremities 

grossly, no contractures, 


Neuro:  CN II-XI grossly intact, light touch intact all 4 extremities, finger to

nose within normal limits,


Psych: Alert, oriented, appropriate affect 








Results


CBC & Chem 7: 


                                 19 14:29





                                 19 14:29


Labs: 


                  Abnormal Lab Results - Last 24 Hours (Table)











  19 Range/Units





  14:26 14:29 14:29 


 


WBC   12.8 H   (3.8-10.6)  k/uL


 


RBC   6.78 H   (3.80-5.40)  m/uL


 


MCV   67.6 L   (80.0-100.0)  fL


 


MCH   20.3 L   (25.0-35.0)  pg


 


MCHC   30.1 L   (31.0-37.0)  g/dL


 


RDW   17.3 H   (11.5-15.5)  %


 


Neutrophils #   7.9 H   (1.3-7.7)  k/uL


 


PT     (9.0-12.0)  sec


 


INR     (<1.2)  


 


Sodium    133 L  (137-145)  mmol/L


 


Potassium    5.9 H  (3.5-5.1)  mmol/L


 


BUN    70 H  (7-17)  mg/dL


 


Creatinine    1.67 H  (0.52-1.04)  mg/dL


 


Glucose    277 H  (74-99)  mg/dL


 


POC Glucose (mg/dL)  251 H    (75-99)  mg/dL


 


Plasma Lactic Acid Aleks     (0.7-2.0)  mmol/L


 


Magnesium    1.5 L  (1.6-2.3)  mg/dL


 


Creatine Kinase    29 L  ()  U/L


 


TSH    6.890 H  (0.465-4.680)  mIU/L


 


Ur Leukocyte Esterase     (Negative)  


 


Urine Bacteria     (None)  /hpf


 


Hyaline Casts     (0-2)  /lpf


 


Urine Mucus     (None)  /hpf














  19 Range/Units





  14:29 14:29 Unknown 


 


WBC     (3.8-10.6)  k/uL


 


RBC     (3.80-5.40)  m/uL


 


MCV     (80.0-100.0)  fL


 


MCH     (25.0-35.0)  pg


 


MCHC     (31.0-37.0)  g/dL


 


RDW     (11.5-15.5)  %


 


Neutrophils #     (1.3-7.7)  k/uL


 


PT   12.5 H   (9.0-12.0)  sec


 


INR   1.2 H   (<1.2)  


 


Sodium     (137-145)  mmol/L


 


Potassium     (3.5-5.1)  mmol/L


 


BUN     (7-17)  mg/dL


 


Creatinine     (0.52-1.04)  mg/dL


 


Glucose     (74-99)  mg/dL


 


POC Glucose (mg/dL)     (75-99)  mg/dL


 


Plasma Lactic Acid Aleks  2.2 H*    (0.7-2.0)  mmol/L


 


Magnesium     (1.6-2.3)  mg/dL


 


Creatine Kinase     ()  U/L


 


TSH     (0.465-4.680)  mIU/L


 


Ur Leukocyte Esterase    Trace H  (Negative)  


 


Urine Bacteria    Few H  (None)  /hpf


 


Hyaline Casts    11 H  (0-2)  /lpf


 


Urine Mucus    Rare H  (None)  /hpf











Chest x-ray: report reviewed





Thrombosis Risk Factor Assmnt





- DVT/VTE Prophylaxis


DVT/VTE Prophylaxis: Pharmacologic Prophylaxis ordered





Assessment and Plan


Assessment: 


Symptomatic bradycardia with hx of A fib


- s/p atropine


- follow tele


- hold Diltiazem 


- cardio consult





Sub therapeutic coumadin coagulopathy


- coumadin 5 tonight


- coumadin dosing per pharmacy 





Hyperkalemia, suspect medication induced on top of chronic kidney disease stage 

IV and type IV RTA


-Hold spironolactone


-In the emergency department received insulin, glucose, calcium, and Kayexalate


-Repeat a 


-Consult nephrology


-Gentle IV fluids


-Continue with sodium bicarb





Elevated lactic acid


-Undetermined etiology





Hypomagnesemia


-Replace cautiously and recheck





Leukocytosis, undetermined etiology


-No signs of infection on chest x-ray or urinalysis


-Repeat CBC in a.m., likely stress-induced





DM 2 with hyperglycemia


- BS at home had been in 500 then after lunch today 150 could have been 

symptomatic relative hypoglycemia


- 70/30 10 units BID


- SSI 


- Follow BS


- Check A1c


- Will need quick follow up with Dr. Moore


- hold actos 





Hyperthyroidism


- TSH now high


- hold methamizole


- needs follow-up with Dr. Moore 





Compensated diastolic CHF


- continue Lasix , hold spironolactone


- Not chronically on beta blocker or ACE inhibitor at this point in time will 

not start


-Strict I's and O's


-Daily weights





Chronic:


Severe pulmonary hypertension


Esophagitis


Chronic anemia


Anxiety


History of idiopathic pancreatitis





The patient is admitted with an anticipated greater than 2 midnight stay for 

evaluation of pre-syncope and hyperkalemia.


Surrogate decision-maker: Brother - Shahab


CODE STATUS:Full, no long term vent 


DVT prophylaxis: coumadin 


Discussed with: Patient, ed physician, nursing


Anticipated discharge date: 2-3 days


Anticipated discharge place: home with home health


A total of 65 minutes was spent on the care of this complex patient more than 

50% of the time was spent in counseling and care coordination.

## 2019-09-23 LAB
ANION GAP SERPL CALC-SCNC: 9 MMOL/L
BUN SERPL-SCNC: 62 MG/DL (ref 7–17)
CALCIUM SPEC-MCNC: 9 MG/DL (ref 8.4–10.2)
CHLORIDE SERPL-SCNC: 101 MMOL/L (ref 98–107)
CHOLEST SERPL-MCNC: 166 MG/DL (ref ?–200)
CO2 SERPL-SCNC: 26 MMOL/L (ref 22–30)
ERYTHROCYTE [DISTWIDTH] IN BLOOD BY AUTOMATED COUNT: 5.86 M/UL (ref 3.8–5.4)
ERYTHROCYTE [DISTWIDTH] IN BLOOD: 17.4 % (ref 11.5–15.5)
GLUCOSE BLD-MCNC: 122 MG/DL (ref 75–99)
GLUCOSE BLD-MCNC: 147 MG/DL (ref 75–99)
GLUCOSE BLD-MCNC: 182 MG/DL (ref 75–99)
GLUCOSE BLD-MCNC: 286 MG/DL (ref 75–99)
GLUCOSE SERPL-MCNC: 129 MG/DL (ref 74–99)
HBA1C MFR BLD: 9.8 % (ref 4–6)
HCT VFR BLD AUTO: 39 % (ref 34–46)
HDLC SERPL-MCNC: 43 MG/DL (ref 40–60)
HGB BLD-MCNC: 12.2 GM/DL (ref 11.4–16)
INR PPP: 1.2 (ref ?–1.2)
LDLC SERPL CALC-MCNC: 71 MG/DL (ref 0–99)
MAGNESIUM SPEC-SCNC: 1.5 MG/DL (ref 1.6–2.3)
MCH RBC QN AUTO: 20.8 PG (ref 25–35)
MCHC RBC AUTO-ENTMCNC: 31.2 G/DL (ref 31–37)
MCV RBC AUTO: 66.6 FL (ref 80–100)
PLATELET # BLD AUTO: 224 K/UL (ref 150–450)
POTASSIUM SERPL-SCNC: 5.2 MMOL/L (ref 3.5–5.1)
PT BLD: 12.6 SEC (ref 9–12)
SODIUM SERPL-SCNC: 136 MMOL/L (ref 137–145)
TRIGL SERPL-MCNC: 262 MG/DL (ref ?–150)
WBC # BLD AUTO: 11.4 K/UL (ref 3.8–10.6)

## 2019-09-23 RX ADMIN — MAGNESIUM SULFATE IN DEXTROSE SCH MLS/HR: 10 INJECTION, SOLUTION INTRAVENOUS at 13:01

## 2019-09-23 RX ADMIN — ATORVASTATIN CALCIUM SCH MG: 20 TABLET, FILM COATED ORAL at 09:17

## 2019-09-23 RX ADMIN — ASPIRIN 325 MG ORAL TABLET SCH MG: 325 PILL ORAL at 09:17

## 2019-09-23 RX ADMIN — Medication SCH MG: at 18:12

## 2019-09-23 RX ADMIN — PANTOPRAZOLE SODIUM SCH MG: 40 TABLET, DELAYED RELEASE ORAL at 18:15

## 2019-09-23 RX ADMIN — INSULIN ASPART SCH UNIT: 100 INJECTION, SOLUTION INTRAVENOUS; SUBCUTANEOUS at 18:12

## 2019-09-23 RX ADMIN — CEFAZOLIN SCH: 330 INJECTION, POWDER, FOR SOLUTION INTRAMUSCULAR; INTRAVENOUS at 11:42

## 2019-09-23 RX ADMIN — INSULIN ASPART SCH UNIT: 100 INJECTION, SOLUTION INTRAVENOUS; SUBCUTANEOUS at 13:01

## 2019-09-23 RX ADMIN — Medication PRN MG: at 22:12

## 2019-09-23 RX ADMIN — INSULIN ASPART SCH UNIT: 100 INJECTION, SUSPENSION SUBCUTANEOUS at 09:17

## 2019-09-23 RX ADMIN — WARFARIN SODIUM SCH: 2.5 TABLET ORAL at 18:12

## 2019-09-23 RX ADMIN — INSULIN ASPART SCH UNIT: 100 INJECTION, SOLUTION INTRAVENOUS; SUBCUTANEOUS at 21:02

## 2019-09-23 RX ADMIN — Medication SCH MG: at 09:17

## 2019-09-23 RX ADMIN — FUROSEMIDE SCH MG: 40 TABLET ORAL at 09:17

## 2019-09-23 RX ADMIN — Medication SCH MG: at 13:01

## 2019-09-23 RX ADMIN — Medication SCH MG: at 21:02

## 2019-09-23 RX ADMIN — CEFAZOLIN SCH MLS/HR: 330 INJECTION, POWDER, FOR SOLUTION INTRAMUSCULAR; INTRAVENOUS at 18:12

## 2019-09-23 RX ADMIN — PANTOPRAZOLE SODIUM SCH MG: 40 TABLET, DELAYED RELEASE ORAL at 06:54

## 2019-09-23 RX ADMIN — MAGNESIUM SULFATE IN DEXTROSE SCH MLS/HR: 10 INJECTION, SOLUTION INTRAVENOUS at 18:11

## 2019-09-23 RX ADMIN — INSULIN ASPART SCH UNIT: 100 INJECTION, SUSPENSION SUBCUTANEOUS at 21:02

## 2019-09-23 RX ADMIN — TIMOLOL MALEATE SCH DROPS: 5 SOLUTION OPHTHALMIC at 09:18

## 2019-09-23 RX ADMIN — INSULIN ASPART SCH: 100 INJECTION, SOLUTION INTRAVENOUS; SUBCUTANEOUS at 06:53

## 2019-09-23 NOTE — CONS
CONSULTATION



REASON FOR CONSULT:

Renal failure.



HISTORY OF PRESENT ILLNESS:

The patient is an 79-year-old female with a history of chronic atrial fibrillation, CKD

stage 3 secondary to diabetes.  The patient also has diastolic heart failure with

severe pulmonary hypertension.  She was admitted to the hospital with complaints of

feeling faint and almost passing out.  The patient has been complaining of increased

fatigue over the past few days.  The patient was noted to have a serum creatinine of

1.67 on initial admission.  It is now down to 1.47.  Review of previous labs show serum

creatinine of about 1.7-1.8 in June of 2019.  Previous creatinine 1.3-1.5 in 2017.  The

patient has been taking sodium chloride tabs as outpatient.  She was concerned about

taking it 4 times a day.  Currently she is maintained on oral Lasix.  The patient had

been on IV fluids, which is now decreased to about 50 mL an hour.  Her potassium was

5.9 on initial admission, down to 5.2 today.



PAST MEDICAL HISTORY:

CKD stage 3, secondary to diabetic nephropathy, type 2 diabetes, hypertension, obesity,

pulmonary hypertension, atrial fibrillation, diastolic heart failure, hiatal hernia.

history of UTI, glaucoma, hypothyroidism, type 4 RTA, chronic anemia.



PAST SURGICAL HISTORY:

Cholecystectomy, cardiac catheterization, tonsillectomy, tubal ligation, EGD, cardiac

cath, eye surgery for glaucoma, colonoscopy, cataract removal.



SOCIAL HISTORY:

Patient is a former smoker.  No history of drug abuse or alcohol abuse.



MEDICATIONS:

Medications prior to admission included Zocor, Ativan, magnesium, Protonix, Actos,

Tapazole, sodium bicarb, diltiazem, Lasix, insulin, spironolactone, Coumadin.



ALLERGIES:

INCLUDE LATEX, WHICH CAUSES RASH AND HIVES.



REVIEW OF SYSTEMS:

As per HPI.



EXAMINATION:

Patient is comfortable, awake, alert, oriented x3, not in any acute distress.  Blood

pressure is 166/71, heart rate 67 per minute. She is afebrile.  Examination of the

heart S1, S2.  Examination of the lungs, bilateral breath sounds are heard.  Abdomen is

soft, obese, nontender.

Examination of lower extremities shows no evidence of edema.  CNS exam grossly intact.



LABS:

Show sodium 136, potassium 5.2, chloride 101, BUN 62, serum creatinine 1.47, hemoglobin

12.2 g/dL.  UA is quite unremarkable.



ASSESSMENT:

1. Chronic kidney disease stage 3 secondary to diabetic nephropathy.  However, current

    urinalysis does not show any evidence of proteinuria, and this may well be old

    nephrosclerosis.

2. Acute kidney injury, appears to be prerenal currently improved post hydration.

3. Hyperkalemia associated with acute kidney injury, now improved.  The patient is

    advised to avoid constipation.  I will decrease the sodium bicarbs tabs.  Continue

    with oral Lasix for now.

4. Metabolic acidosis as an outpatient, maintained on oral sodium bicarb.

5. Type 4 RTA associated with hyperkalemia, maintained on Lasix and sodium bicarb

    currently off of Aldactone.

6. Bradycardia with history of atrial fibrillation, being followed by Cardiology.

7. Hypothyroidism, maintained on Tapazole.  Follows with Endocrinology as outpatient.

8. History of diastolic heart failure.



PLAN:

Continue with oral Lasix.  Decrease IV fluids further.  If the patient is eating well,

we can Hep-Lock the IV and maintain patient on low-potassium diet and can decrease

sodium bicarb to 3 times a day.



Thank you for this consultation.  We will continue to follow the patient with you

during her hospitalization.





MMODL / IJN: [id number] / Job#: [id number]

## 2019-09-23 NOTE — P.CRDCN
History of Present Illness


Consult date: 19


History of present illness: 


This is a 79-year-old female with history of of paroxysmal atrial fibrillation 

with episodes of bradycardia, hypothyroidism, and chronic kidney disease and 

also diabetes and diastolic CHF.  Patient was on Cardizem for control of her 

atrial fibrillation.  Apparently she was also getting diuretics including 

Aldactone.  Patient is admitted to the hospital with an episode of dizziness and

near syncope.  In the emergency room patient was found to be bradycardic with 

heart rates in the 40s and sinus bradycardia.  Patient was given atropine.  She 

was also found to have high potassium with in the range of 5.9.  Patient was 

given Kayoxalate and other measures to bring the potassium down.  She is 

complaining of extreme tiredness.  Her heart rate is in the 60s today and his 

blood pressure is stable.  Denies any chest pain or shortness of breath.  She 

wants to go home.  Because of recurrent episodes of bradycardia and known atrial

fibrillation with rapid ventricular response which is paroxysmal, patient is 

advised to have permanent pacemaker implantation.  Patient is agreeable and 

we're planning to do the pacemaker tomorrow.  Meanwhile we'll keep her off the 

Cardizem and also Aldactone and ACE inhibitor .  Continue with the Lasix, Lipi

tor and aspirin.  Her INR was only 1.2 on admission.  She was supposed to be on 

Coumadin.  We'll plan to put her on heparin and discontinue 4 hours prior to the

procedure.  In the long run, patient may be better being on Eliquis. 








Review of Systems





As per the chart





Past Medical History


Past Medical History: Atrial Fibrillation, Diabetes Mellitus, Eye Disorder, 

GERD/Reflux, Hyperlipidemia, Hypertension, Osteoarthritis (OA), Renal Disease, 

Thyroid Disorder


Additional Past Medical History / Comment(s): afib/hyperthyroidism/echo was 55-

60%. Idiopathic pancreatitis, esophagitis, gastritis, hiatal hernia,  IDDM type 

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 

back, chronic R shoulder pain since fall/fracture,  prone to UTIs, bilateral 

glaucoma with surgery.  Severe pulmonary hypertension, hyperthyroidism.  Type IV

renal tubular acidosis. Chronic anemia.


History of Any Multi-Drug Resistant Organisms: None Reported


Past Surgical History: Cholecystectomy, Heart Catheterization, Tonsillectomy, 

Tubal Ligation


Additional Past Surgical History / Comment(s): EGD with bx 17,   2010 

cardiac cath-normal, bilateral cataract removal, bilateral eye surgery for 

glaucoma, colonoscopy.


Past Anesthesia/Blood Transfusion Reactions: No Reported Reaction


Additional Past Anesthesia/Blood Transfusion Reaction / Comment(s): Pt has 

clausterphobia.


Past Psychological History: Anxiety, Depression


Smoking Status: Former smoker


Past Alcohol Use History: None Reported


Past Drug Use History: None Reported





- Past Family History


  ** Father


Family Medical History: Cancer


Additional Family Medical History / Comment(s): Father  at the age of 81 yrs

of lung cancer.  He was a smoker.





  ** Mother


Family Medical History: Cancer


Additional Family Medical History / Comment(s): Mother had cervical cancer.  She

 at the age of 53 from her lupus.





Medications and Allergies


                                Home Medications











 Medication  Instructions  Recorded  Confirmed  Type


 


Simvastatin [Zocor] 40 mg PO DAILY 14 History


 


LORazepam [Ativan] 0.5 mg PO HS PRN 17 History


 


Timolol [Betimol 0.5% Ophth Soln] 1 drop BOTH EYES DAILY 17 

History


 


Magnesium 200 mg PO DAILY 19 History


 


Pantoprazole [Protonix] 40 mg PO BID 19 History


 


Pioglitazone HCl [Actos] 15 mg PO DAILY 19 History


 


Methimazole [Tapazole] 5 mg PO DAILY #30 tab 19 Rx


 


Sodium Bicarbonate Tab 650 mg PO QID #120 tab 19 Rx


 


Diltiazem HCl [Diltiazem ER] 240 mg PO DAILY 19 History


 


Furosemide [Lasix] 40 mg PO DAILY 19 History


 


Insulin NPH Hum/Reg Insulin Hm 10 unit SQ BID 19 History





[NovoLIN 70-30 100 UNIT/ML VIAL]    


 


Spironolactone 25 mg PO DAILY 19 History


 


Warfarin [Coumadin] 2.5 mg PO HS 19 History








                                    Allergies











Allergy/AdvReac Type Severity Reaction Status Date / Time


 


latex Allergy  Rash/Hives Verified 19 14:15














Physical Exam


Vitals: 


                                   Vital Signs











  Temp Pulse Pulse Pulse Resp BP BP


 


 19 04:00  98.4 F    61  17   140/65


 


 19 00:00  97.7 F   61  62  16   141/91


 


 19 20:00  98.1 F   64  62  17   123/67


 


 19 18:05  97.6 F    46 L  18   138/63


 


 19 18:03     47 L  18  


 


 19 18:02       


 


 19 16:33  97.6 F    50 L  20  


 


 19 15:58  97.7 F  50 L    18  129/79 


 


 19 15:49   50 L    18  129/79 


 


 19 15:00   60    18  122/62 


 


 19 14:45   42 L    18  124/72 


 


 19 14:14  97.7 F  41 L    20  122/54 














  Pulse Ox


 


 19 04:00  98


 


 19 00:00  96


 


 19 20:00  93 L


 


 19 18:05  98


 


 19 18:03 


 


 19 18:02  98


 


 19 16:33  100


 


 19 15:58  98


 


 19 15:49  98


 


 19 15:00  97


 


 19 14:45  95


 


 19 14:14  98








                                Intake and Output











 19





 22:59 06:59 14:59


 


Intake Total 360 600 


 


Balance 360 600 


 


Intake:   


 


  Intake, IV Titration  600 





  Amount   


 


    Sodium Chloride 0.9% 1,  600 





    000 ml @ 75 mls/hr IV .   





    [id number] [facility] Rx#:[id number]   


 


  Oral 360  


 


Other:   


 


  Voiding Method Toilet Toilet 


 


  # Voids 2  


 


  Weight  75.8 kg 














GENERAL EXAM: Patient is alert and oriented and doesn't appear to be in any 

acute distress


HEENT: Normocephalic. Normal reaction of pupils, equal size, normal range of 

extraocular motion. No erythema or exudates in the throat.


NECK: No masses, no nuchal rigidity.


CHEST: No chest wall deformity.


LUNGS: Equal air entry with no crackles or wheeze.


HEART: S1 and S2 normal with no audible mumurs or gallops. Regular rhythm, 

femorals equal on both sides..


ABDOMEN: No hepatosplenomegaly, normal bowel sounds, no guarding or rigidity.


SKIN: No rashes


CENTRAL NERVOUS SYSTEM: No focal deficits.


EXTREMITIES: No cyanosis, clubbing or edema.





Results





                                 19 02:40





                                 19 02:40


                                 Cardiac Enzymes











  19 Range/Units





  14:29 14:29 20:55 


 


AST  28    (14-36)  U/L


 


Troponin I   0.012  <0.012  (0.000-0.034)  ng/mL














  19 Range/Units





  02:40 


 


AST   (14-36)  U/L


 


Troponin I  <0.012  (0.000-0.034)  ng/mL








                                   Coagulation











  19 Range/Units





  14:29 02:40 


 


PT  12.5 H  12.6 H  (9.0-12.0)  sec


 


APTT  23.5   (22.0-30.0)  sec








                                     Lipids











  19 Range/Units





  02:40 


 


Triglycerides  262 H  (<150)  mg/dL


 


Cholesterol  166  (<200)  mg/dL


 


HDL Cholesterol  43  (40-60)  mg/dL








                                       CBC











  19 Range/Units





  14:29 02:40 


 


WBC  12.8 H  11.4 H  (3.8-10.6)  k/uL


 


RBC  6.78 H  5.86 H  (3.80-5.40)  m/uL


 


Hgb  13.8  12.2  (11.4-16.0)  gm/dL


 


Hct  45.8  39.0  (34.0-46.0)  %


 


Plt Count  285  224  (150-450)  k/uL








                          Comprehensive Metabolic Panel











  19 Range/Units





  14:29 20:55 02:40 


 


Sodium  133 L  136 L  136 L  (137-145)  mmol/L


 


Potassium  5.9 H  4.7  5.2 H  (3.5-5.1)  mmol/L


 


Chloride  99  99  101  ()  mmol/L


 


Carbon Dioxide  22  25  26  (22-30)  mmol/L


 


BUN  70 H  63 H  62 H  (7-17)  mg/dL


 


Creatinine  1.67 H  1.50 H  1.47 H  (0.52-1.04)  mg/dL


 


Glucose  277 H  180 H  129 H  (74-99)  mg/dL


 


Calcium  9.9  9.4  9.0  (8.4-10.2)  mg/dL


 


AST  28    (14-36)  U/L


 


ALT  15    (9-52)  U/L


 


Alkaline Phosphatase  121    ()  U/L


 


Total Protein  7.9    (6.3-8.2)  g/dL


 


Albumin  4.3    (3.5-5.0)  g/dL








                               Current Medications











Generic Name Dose Route Start Last Admin





  Trade Name Freq  PRN Reason Stop Dose Admin


 


Acetaminophen  650 mg  19 16:55 





  Tylenol Tab  PO  





  Q6HR PRN  





  Mild Pain or Fever > 100.5  


 


Aspirin  325 mg  19 09:00  19 09:17





  Aspirin  PO   325 mg





  DAILY ANA   Administration


 


Atorvastatin Calcium  20 mg  19 09:00  19 09:17





  Lipitor  PO   20 mg





  DAILY ANA   Administration


 


Bisacodyl  5 mg  19 16:55 





  Dulcolax  PO  





  DAILY PRN  





  Constipation  


 


Furosemide  40 mg  19 09:00  19 09:17





  Lasix  PO   40 mg





  DAILY ANA   Administration


 


Sodium Chloride  1,000 mls @ 75 mls/hr  19 15:30  19 16:52





  Saline 0.9%  IV   Not Given





  .[id number] ANA  


 


Insulin Aspart  0 unit  19 17:30  19 06:53





  Novolog  SQ   Not Given





  [facility]  





  Protocol  


 


Insulin Aspart  10 unit  19 21:00  19 09:17





  Novolog Mix 70-30 Vial  SQ   10 unit





  BID ANA   Administration


 


Lorazepam  0.5 mg  19 17:04  19 17:45





  Ativan  PO   0.5 mg





  TID PRN   Administration





  Anxiety  


 


Magnesium Oxide  200 mg  19 09:00  19 09:17





  Mag-Ox  PO   200 mg





  DAILY ANA   Administration


 


Melatonin  3 mg  19 16:55  19 23:38





  Melatonin  PO   3 mg





  HS PRN   Administration





  Insomnia  


 


Miscellaneous Information  1 each  19 17:03 





  Coumadin Per Pharmacy  MISCELLANE  





  AS DIRECTED PRN  





  Per Protocol  


 


Naloxone HCl  0.2 mg  19 16:55 





  Narcan  IV  





  Q2M PRN  





  Opioid Reversal  


 


Nitroglycerin  0.4 mg  19 15:30 





  Nitrostat  SUBLINGUAL  





  Q5M PRN  





  Chest Pain  


 


Ondansetron HCl  4 mg  19 16:55 





  Zofran  IVP  





  Q8HR PRN  





  Nausea And Vomiting  


 


Pantoprazole Sodium  40 mg  19 17:30  19 06:54





  Protonix  PO   40 mg





  AC-BID ANA   Administration


 


Sodium Bicarbonate  650 mg  19 18:00  19 09:17





  Sodium Bicarbonate Tab  PO   650 mg





  QID ANA   Administration


 


Timolol Maleate  1 drops  19 09:00  19 09:18





  Timoptic  BOTH EYES   1 drops





  DAILY ANA   Administration


 


Warfarin Sodium  2.5 mg  19 18:00 





  Coumadin  PO  





  DAILY@1800 ANA  








                                Intake and Output











 19





 22:59 06:59 14:59


 


Intake Total 360 600 


 


Balance 360 600 


 


Intake:   


 


  Intake, IV Titration  600 





  Amount   


 


    Sodium Chloride 0.9% 1,  600 





    000 ml @ 75 mls/hr IV .   





    [id number] [facility] Rx#:[id number]   


 


  Oral 360  


 


Other:   


 


  Voiding Method Toilet Toilet 


 


  # Voids 2  


 


  Weight  75.8 kg 








                                        





                                 19 02:40 





                                 19 02:40 











EKG Interpretations (text)





Sinus bradycardia on admission





Assessment and Plan


(1) Sick sinus syndrome


Current Visit: Yes   Status: Acute   Code(s): I49.5 - SICK SINUS SYNDROME   SNO

MED Code(s): 88269477


   





(2) Paroxysmal atrial fibrillation


Current Visit: Yes   Status: Acute   Code(s): I48.0 - PAROXYSMAL ATRIAL 

FIBRILLATION   SNOMED Code(s): 029362927


   





(3) Near syncope


Current Visit: Yes   Status: Acute   Code(s): R55 - SYNCOPE AND COLLAPSE   SN

OMED Code(s): 792317108


   





(4) Chronic renal failure


Current Visit: Yes   Status: Acute   Code(s): N18.9 - CHRONIC KIDNEY DISEASE, 

UNSPECIFIED   SNOMED Code(s): 33990504


   





(5) Hyperthyroidism


Current Visit: No   Status: Acute   Code(s): E05.90 - THYROTOXICOSIS, UNSP 

WITHOUT THYROTOXIC CRISIS OR STORM   SNOMED Code(s): 89823856


   


Plan: 


This patient will benefit from permanent pacemaker implantation because of sick 

sinus syndrome and episodes of bradycardia and also paroxysmal atrial 

fibrillation.  Patient is supposed to be on Coumadin but her INR is 

subtherapeutic.  It is not clear if she is taking the medication regularly.  

Patient consented for permanent pacemaker implantation which was probably 

performed tomorrow.  Further recommendation will depend upon the clinical 

course.

## 2019-09-24 LAB
ANION GAP SERPL CALC-SCNC: 11 MMOL/L
BUN SERPL-SCNC: 60 MG/DL (ref 7–17)
CALCIUM SPEC-MCNC: 9.5 MG/DL (ref 8.4–10.2)
CHLORIDE SERPL-SCNC: 98 MMOL/L (ref 98–107)
CO2 SERPL-SCNC: 27 MMOL/L (ref 22–30)
GLUCOSE BLD-MCNC: 132 MG/DL (ref 75–99)
GLUCOSE BLD-MCNC: 156 MG/DL (ref 75–99)
GLUCOSE BLD-MCNC: 186 MG/DL (ref 75–99)
GLUCOSE BLD-MCNC: 367 MG/DL (ref 75–99)
GLUCOSE SERPL-MCNC: 153 MG/DL (ref 74–99)
INR PPP: 1.2 (ref ?–1.2)
POTASSIUM SERPL-SCNC: 5.1 MMOL/L (ref 3.5–5.1)
PT BLD: 12.7 SEC (ref 9–12)
SODIUM SERPL-SCNC: 136 MMOL/L (ref 137–145)

## 2019-09-24 PROCEDURE — 0JH606Z INSERTION OF PACEMAKER, DUAL CHAMBER INTO CHEST SUBCUTANEOUS TISSUE AND FASCIA, OPEN APPROACH: ICD-10-PCS

## 2019-09-24 PROCEDURE — 02H63JZ INSERTION OF PACEMAKER LEAD INTO RIGHT ATRIUM, PERCUTANEOUS APPROACH: ICD-10-PCS

## 2019-09-24 PROCEDURE — 02HL3JZ INSERTION OF PACEMAKER LEAD INTO LEFT VENTRICLE, PERCUTANEOUS APPROACH: ICD-10-PCS

## 2019-09-24 RX ADMIN — INSULIN ASPART SCH: 100 INJECTION, SOLUTION INTRAVENOUS; SUBCUTANEOUS at 06:39

## 2019-09-24 RX ADMIN — WARFARIN SODIUM SCH MG: 2.5 TABLET ORAL at 16:41

## 2019-09-24 RX ADMIN — INSULIN ASPART SCH UNIT: 100 INJECTION, SUSPENSION SUBCUTANEOUS at 20:36

## 2019-09-24 RX ADMIN — INSULIN ASPART SCH UNIT: 100 INJECTION, SOLUTION INTRAVENOUS; SUBCUTANEOUS at 16:47

## 2019-09-24 RX ADMIN — Medication SCH MG: at 20:37

## 2019-09-24 RX ADMIN — CEFAZOLIN ONE MLS: 330 INJECTION, POWDER, FOR SOLUTION INTRAMUSCULAR; INTRAVENOUS at 13:08

## 2019-09-24 RX ADMIN — SODIUM CHLORIDE, PRESERVATIVE FREE SCH ML: 5 INJECTION INTRAVENOUS at 20:36

## 2019-09-24 RX ADMIN — Medication SCH MG: at 06:44

## 2019-09-24 RX ADMIN — PANTOPRAZOLE SODIUM SCH MG: 40 TABLET, DELAYED RELEASE ORAL at 16:41

## 2019-09-24 RX ADMIN — ATORVASTATIN CALCIUM SCH MG: 20 TABLET, FILM COATED ORAL at 06:43

## 2019-09-24 RX ADMIN — FUROSEMIDE SCH: 40 TABLET ORAL at 16:28

## 2019-09-24 RX ADMIN — PANTOPRAZOLE SODIUM SCH MG: 40 TABLET, DELAYED RELEASE ORAL at 06:43

## 2019-09-24 RX ADMIN — CEFAZOLIN ONE MLS: 330 INJECTION, POWDER, FOR SOLUTION INTRAMUSCULAR; INTRAVENOUS at 13:12

## 2019-09-24 RX ADMIN — CEFAZOLIN SCH MLS/HR: 330 INJECTION, POWDER, FOR SOLUTION INTRAMUSCULAR; INTRAVENOUS at 06:44

## 2019-09-24 RX ADMIN — INSULIN ASPART SCH: 100 INJECTION, SUSPENSION SUBCUTANEOUS at 08:13

## 2019-09-24 RX ADMIN — INSULIN ASPART SCH: 100 INJECTION, SOLUTION INTRAVENOUS; SUBCUTANEOUS at 16:29

## 2019-09-24 RX ADMIN — Medication SCH MG: at 16:41

## 2019-09-24 RX ADMIN — TIMOLOL MALEATE SCH: 5 SOLUTION OPHTHALMIC at 16:29

## 2019-09-24 RX ADMIN — CEFAZOLIN SCH MLS/HR: 330 INJECTION, POWDER, FOR SOLUTION INTRAMUSCULAR; INTRAVENOUS at 16:45

## 2019-09-24 RX ADMIN — Medication SCH MG: at 06:43

## 2019-09-24 RX ADMIN — CEFAZOLIN SCH: 330 INJECTION, POWDER, FOR SOLUTION INTRAMUSCULAR; INTRAVENOUS at 06:39

## 2019-09-24 RX ADMIN — ASPIRIN 325 MG ORAL TABLET SCH MG: 325 PILL ORAL at 06:44

## 2019-09-24 RX ADMIN — INSULIN ASPART SCH UNIT: 100 INJECTION, SOLUTION INTRAVENOUS; SUBCUTANEOUS at 20:36

## 2019-09-24 NOTE — P.PN
Subjective


Progress Note Date: 09/24/19





Patient is seen and examined and follow-up, heart rate improved down the 60s. 

scheduled for pacemaker placement today.





Objective





- Vital Signs


Vital signs: 


                                   Vital Signs











Temp  98.0 F   09/24/19 04:17


 


Pulse  57 L  09/24/19 08:00


 


Resp  16   09/24/19 08:00


 


BP  173/81   09/24/19 08:00


 


Pulse Ox  98   09/24/19 08:00








                                 Intake & Output











 09/23/19 09/24/19 09/24/19





 18:59 06:59 18:59


 


Intake Total 1180  


 


Output Total  820 


 


Balance 1180 -820 


 


Weight 75.8 kg 75.4 kg 


 


Intake:   


 


  Intake, IV Titration 600  





  Amount   


 


    Sodium Chloride 0.9% 1, 600  





    000 ml @ 75 mls/hr IV .   





    [id number] [facility] Rx#:[id number]   


 


  Oral 580  


 


Output:   


 


  Urine  820 


 


Other:   


 


  Voiding Method  Toilet 


 


  # Voids  2 














- Exam





Constitutional: No acute distress, conversant, pleasant





Eyes: Anicteric sclerae, moist conjunctiva, no lid-lag, PERRLA





ENMT: NC/AT,Oropharynx clear, no erythema, exudates





Neck:Supple, FROM, no masses, or JVD, No carotid bruits; No thyromegaly





Lungs: Clear to auscultation, Clear to percussion, Normal respiratory effort, no

accessory muscle use 





Cardiovascular: Heart regular in rate and rhythm,  No murmurs, gallops, or rubs 

no peripheral edema





Abdominal: Soft Nontender, nom distended, no guarding, no rebound or  rigidity, 

Normoactive bowel sounds No hepatomegaly, No splenomegaly,  No palpable mass No 

abdominal wall hernia noted 





Skin: Normal temperature, tone, texture, turgor, No induration No subcutaneous 

nodules, No rash, lesions, No ulcers





Extremities:No digital cyanosis No clubbing, Pedal pulses intact and  

symmetrical Radial pulses intact and symmetrical Normal gait and station, No 

calf tenderness


 


Psychiatric: Alert and oriented to person, place and time, Appropriate affect 

Intact judgement   


      


Neuro: Muscles Strength 5/5 in all 4 extremities, Sensation to light touch 

grossly present throughout, Cranial nerves II-XII grossly intact. No focal 

sensory deficits








- Labs


CBC & Chem 7: 


                                 09/23/19 02:40





                                 09/24/19 05:08


Labs: 


                  Abnormal Lab Results - Last 24 Hours (Table)











  09/23/19 09/23/19 09/23/19 Range/Units





  02:40 12:31 17:31 


 


PT     (9.0-12.0)  sec


 


INR     (<1.2)  


 


Sodium     (137-145)  mmol/L


 


BUN     (7-17)  mg/dL


 


Creatinine     (0.52-1.04)  mg/dL


 


Glucose     (74-99)  mg/dL


 


POC Glucose (mg/dL)   147 H  286 H  (75-99)  mg/dL


 


Hemoglobin A1c  9.8 H    (4.0-6.0)  %














  09/23/19 09/24/19 09/24/19 Range/Units





  20:31 05:08 05:08 


 


PT   12.7 H   (9.0-12.0)  sec


 


INR   1.2 H   (<1.2)  


 


Sodium    136 L  (137-145)  mmol/L


 


BUN    60 H  (7-17)  mg/dL


 


Creatinine    1.60 H  (0.52-1.04)  mg/dL


 


Glucose    153 H  (74-99)  mg/dL


 


POC Glucose (mg/dL)  182 H    (75-99)  mg/dL


 


Hemoglobin A1c     (4.0-6.0)  %














  09/24/19 Range/Units





  06:33 


 


PT   (9.0-12.0)  sec


 


INR   (<1.2)  


 


Sodium   (137-145)  mmol/L


 


BUN   (7-17)  mg/dL


 


Creatinine   (0.52-1.04)  mg/dL


 


Glucose   (74-99)  mg/dL


 


POC Glucose (mg/dL)  156 H  (75-99)  mg/dL


 


Hemoglobin A1c   (4.0-6.0)  %














Assessment and Plan


(1) Sick sinus syndrome


Narrative/Plan: 





* Cardiology consulted appreciated recommendations


* Planning for implantable dual-chamber pacemaker placement later today 


Current Visit: Yes   Status: Acute   Code(s): I49.5 - SICK SINUS SYNDROME   

SNOMED Code(s): 76342063


   





(2) Syncope


Narrative/Plan: 





Secondary to sick sinus syndrome


Current Visit: Yes   Status: Resolved   Code(s): R55 - SYNCOPE AND COLLAPSE   

SNOMED Code(s): 559972597


   





(3) Paroxysmal atrial fibrillation


Narrative/Plan: 





* Beta blocker held secondary to bradycardia/sick sinus syndrome


* INR subtherapeutic


* Pharmacy to dose Coumadin


Current Visit: Yes   Status: Acute   Code(s): I48.0 - PAROXYSMAL ATRIAL 

FIBRILLATION   SNOMED Code(s): 552896506


   





(4) Hyperkalemia


Narrative/Plan: 





* Potassium 5.2 today


* Nephrology following


Current Visit: Yes   Status: Acute   Code(s): E87.5 - HYPERKALEMIA   SNOMED 

Code(s): 62406400


   





(5) CKD (chronic kidney disease), stage IV


Narrative/Plan: 





* Stable chronic kidney disease stage III with hyperkalemia


* Nephrology consulted for further recommendations


Current Visit: Yes   Status: Acute   Code(s): N18.4 - CHRONIC KIDNEY DISEASE, 

STAGE 4 (SEVERE)   SNOMED Code(s): 237473957


   





(6) Hypomagnesemia


Narrative/Plan: 





* Replace and recheck labs tomorrow


Current Visit: Yes   Status: Acute   Code(s): E83.42 - HYPOMAGNESEMIA   SNOMED 

Code(s): 977987357


   


Plan: 





Disposition


* Anticipated discharge 2-3 days


* Follow-up consultants recommendations


* Continue to monitor closely

## 2019-09-24 NOTE — P.PCN
Date of Procedure: 09/24/19


Preoperative Diagnosis: 


Sick sinus syndrome with bradycardia alternating with A. fib with RVR


Postoperative Diagnosis: 


The same


Procedure(s) Performed: 


Axillary venography and a dual-chamber permanent pacemaker insertion


Description of Procedure: 


HISTORY: This is a 79-year-old female with history of paroxysmal atrial 

fibrillation alternating with bradycardia and dizziness and near syncope 

episodes.  Patient is advised to have permanent pacemaker implantation.








CONSENT:I have discussed the risks, benefits and alternative therapies for the 

above-mentioned procedure and for both sedation/analgesia as well as necessary 

blood product administration, if indicated, as they pertain to this patient.  

The patient has indicated understanding and acceptance of the risks and 

procedures discussed.








PROCEDURE: Patient was brought to the lab in a fasting state.  Patient was 

prepped and draped in the usual fashion.  Patient was given IV sedation with 

fentanyl and Versed.  The skin below the left clavicle was infiltrated with lido

patricia.  An incision was made parallel to deltopectoral groove was deepened until

the pectoral fascia was exposed.  A pocket was created by blunt dissection and 

cautery.  Axillary venography was performed to delineate the course of the 

axillary vein.  2 sticks were performed into extrathoracic portion of the 

axillary vein and 2 sheaths were advanced over the guidewires and left in 

subclavian vein. 








Conscious Sedation: Versed 1mg


Fentanyl 50 g


Duration 68minutes    


LEADS: 


                       ATRIAL: This is manufactured by [employer].  Model number 

is 5076-45.  And the serial number is [id number]


                       VENTRICULAR: This is manufactured by [employer].  Model 

number is 5076-52.  The serial number is [id number]


               


                         The ventricular lead is maneuvered l with help of a 

straight and curved stylets into the left ventricle apical region.  Satisfactory

position was obtained and threshold measurements were made.  The atrial lead was

then maneuvered into the right atrial appendage.  And thresholds were obtained.


              THRESHOLDS: 


                        ATRIUM: The minimum patient threshold was 1.75 at pulse 

width of 0.4 with impedance of 8 and 36 ohms and P-wave is 1.5 


                        VENTRICLE: The minimum patient threshold is 0.5 at pulse

width of 0.4 with impedance of 855 ohms.  The R-wave is 13.25 


                The leads and pulse generator remained in the pocket after it 

was washed with antibiotics.  Pocket was closed in the usual fashion.  The 

fascia was closed with 2-0 Prolene ,the subcutaneous tissue was closed with 3-0 

Prolene and the skin was closed with 4-0 Prolene.





                  PROGRAMMING:                            MODE: AAIR with mode 

switch to DDDR RATE:  OUTPUT:  Atrium: 3.5 Ventricle: 3.5


FINAL IMPRESSION: #1.  Axillary venography #2.  Dual-chamber permanent pacemaker

implantation COMPLICATIONS: Done


PLAN:.  Patient will be monitored on telemetry unit.  Prophylactic antibiotics 

will be continued.  Chest x-ray in the morning.  Possible discharge in 24 hours

## 2019-09-24 NOTE — PN
PROGRESS NOTE



Patient was seen this morning for followup for acute kidney injury and chronic kidney

disease.  She is currently comfortable.  Patient denies any significant complaints.

Patient is maintained on oral Lasix.  She is on IV fluids in the form of normal saline.

She denies any nausea, vomiting or abdominal pain.



On examination, blood pressure was 173/81 this morning, heart rate 57 per minute.

Patient is afebrile.

EXAMINATION OF THE HEART: S1 and S2.

EXAMINATION OF LUNGS: Bilateral breath sounds are heard.

ABDOMEN:  Soft, non-tender, obese.

Examination of lower extremities shows no significant edema.

CNS exam is grossly intact.





Labs show sodium 136, potassium 5.1, BUN 60, serum creatinine 1.6, magnesium 2.1.



ASSESSMENT:

1. Chronic kidney disease, stage III, secondary to diabetic nephropathy and

    nephrosclerosis.  Baseline creatinine about 1.7 to 1.6 mg/dL. Her creatinine had

    gone down to about 1.47.  Patient is maintained on IV fluids.  I will continue with

    the saline for now.  Patient is not on any nephrotoxic medications.

2. Bradycardia, status post pacemaker placement, which was done this afternoon.

3. Hypomagnesemia, status post replacement.

4. 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



PLAN:

Maintain saline at about 50 mL/hour. I will hold off on the Lasix for now.  Repeat labs

in a.m.





MMODL / IJN: [id number] / Job#: [id number]

## 2019-09-25 VITALS — HEART RATE: 67 BPM | SYSTOLIC BLOOD PRESSURE: 128 MMHG | DIASTOLIC BLOOD PRESSURE: 68 MMHG

## 2019-09-25 VITALS — TEMPERATURE: 98.3 F | RESPIRATION RATE: 16 BRPM

## 2019-09-25 LAB
ANION GAP SERPL CALC-SCNC: 11 MMOL/L
BUN SERPL-SCNC: 46 MG/DL (ref 7–17)
CALCIUM SPEC-MCNC: 9.2 MG/DL (ref 8.4–10.2)
CHLORIDE SERPL-SCNC: 104 MMOL/L (ref 98–107)
CO2 SERPL-SCNC: 21 MMOL/L (ref 22–30)
GLUCOSE BLD-MCNC: 151 MG/DL (ref 75–99)
GLUCOSE BLD-MCNC: 243 MG/DL (ref 75–99)
GLUCOSE SERPL-MCNC: 130 MG/DL (ref 74–99)
INR PPP: 1.2 (ref ?–1.2)
POTASSIUM SERPL-SCNC: 4.7 MMOL/L (ref 3.5–5.1)
PT BLD: 12.6 SEC (ref 9–12)
SODIUM SERPL-SCNC: 136 MMOL/L (ref 137–145)

## 2019-09-25 RX ADMIN — PANTOPRAZOLE SODIUM SCH MG: 40 TABLET, DELAYED RELEASE ORAL at 16:50

## 2019-09-25 RX ADMIN — ASPIRIN 325 MG ORAL TABLET SCH MG: 325 PILL ORAL at 09:02

## 2019-09-25 RX ADMIN — Medication SCH MG: at 09:03

## 2019-09-25 RX ADMIN — INSULIN ASPART SCH UNIT: 100 INJECTION, SUSPENSION SUBCUTANEOUS at 09:02

## 2019-09-25 RX ADMIN — SODIUM CHLORIDE, PRESERVATIVE FREE SCH ML: 5 INJECTION INTRAVENOUS at 09:03

## 2019-09-25 RX ADMIN — CEFAZOLIN SCH MLS/HR: 330 INJECTION, POWDER, FOR SOLUTION INTRAMUSCULAR; INTRAVENOUS at 06:35

## 2019-09-25 RX ADMIN — ATORVASTATIN CALCIUM SCH MG: 20 TABLET, FILM COATED ORAL at 09:03

## 2019-09-25 RX ADMIN — PANTOPRAZOLE SODIUM SCH MG: 40 TABLET, DELAYED RELEASE ORAL at 06:33

## 2019-09-25 RX ADMIN — INSULIN ASPART SCH UNIT: 100 INJECTION, SOLUTION INTRAVENOUS; SUBCUTANEOUS at 12:41

## 2019-09-25 RX ADMIN — Medication SCH MG: at 09:02

## 2019-09-25 RX ADMIN — TIMOLOL MALEATE SCH: 5 SOLUTION OPHTHALMIC at 09:03

## 2019-09-25 RX ADMIN — INSULIN ASPART SCH UNIT: 100 INJECTION, SOLUTION INTRAVENOUS; SUBCUTANEOUS at 06:33

## 2019-09-25 RX ADMIN — Medication SCH MG: at 16:50

## 2019-09-25 NOTE — P.DS
Providers


Date of admission: 


09/22/19 15:30





Expected date of discharge: 09/25/19


Attending physician: 


Danae Pablo, 





Consults: 





                                        





09/22/19 15:30


Consult Physician Urgent 


   Consulting Provider: Nate Chou


   Consult Reason/Comments: keri


   Do you want consulting provider notified?: Yes





09/22/19 17:05


Consult Physician Routine 


   Consulting Provider: Amadou Lorenzana


   Consult Reason/Comments: Hyperkalemia, type IV RTA


   Do you want consulting provider notified?: Yes, Notify in am











Primary care physician: 


Mckay Martinez








- Discharge Diagnosis(es)


(1) Sick sinus syndrome


Current Visit: Yes   Status: Acute   





(2) Syncope


Current Visit: Yes   Status: Resolved   





(3) Paroxysmal atrial fibrillation


Current Visit: Yes   Status: Acute   





(4) Hyperkalemia


Current Visit: Yes   Status: Acute   





(5) CKD (chronic kidney disease), stage IV


Current Visit: Yes   Status: Acute   





(6) Hypomagnesemia


Current Visit: Yes   Status: Acute   


Hospital Course: 





The patient is a 79-year-old  female that was admitted for sick sinus 

syndrome after presenting with presyncope and was found to have several episodes

of bradycardia with heart rate in the 40s.  The patient was also on Cardizem for

paroxysmal A. fib and this was held, she was also noted to be hyper K Tyler make 

the potassium of 5.9 she was given Kayexalate other temporizing measures to 

bring her potassium down, her Aldactone was held.  Cardiology was consulted and 

proceeded with placement of a dual-chamber pacemaker.  The patient was noted to 

have a subtherapeutic INR at 1.2.  Cardiology recommended increasing Coumadin 

dose to 5 mg by mouth daily at bedtime with rechecking PT/INR on Friday.  The 

patient did well post- pacemaker placement and was discharged back to [facility] in stable condition.  The patient instructed to follow-up with her PCP and

cardiology. This discharge process took approximately 35 minutes


Patient Condition at Discharge: Good





Plan - Discharge Summary


Discharge Rx Participant: Yes


New Discharge Prescriptions: 


New


   Warfarin [Coumadin] 5 mg PO HS #30 tab





Continue


   Simvastatin [Zocor] 40 mg PO DAILY


   LORazepam [Ativan] 0.5 mg PO HS PRN


     PRN Reason: Anxiety


   Timolol [Betimol 0.5% Ophth Soln] 1 drop BOTH EYES DAILY


   Magnesium 200 mg PO DAILY


   Pioglitazone HCl [Actos] 15 mg PO DAILY


   Pantoprazole [Protonix] 40 mg PO BID


   Methimazole [Tapazole] 5 mg PO DAILY #30 tab


   Sodium Bicarbonate Tab 650 mg PO QID #120 tab


   Insulin NPH Hum/Reg Insulin Hm [NovoLIN 70-30 100 UNIT/ML VIAL] 10 unit SQ 

BID


   Diltiazem HCl [Diltiazem 24Hr ER] 240 mg PO DAILY


   Furosemide [Lasix] 40 mg PO DAILY





Discontinued


   Spironolactone 25 mg PO DAILY


   Warfarin [Coumadin] 2.5 mg PO HS


Discharge Medication List





Simvastatin [Zocor] 40 mg PO DAILY 05/30/14 [History]


LORazepam [Ativan] 0.5 mg PO HS PRN 02/21/17 [History]


Timolol [Betimol 0.5% Ophth Soln] 1 drop BOTH EYES DAILY 03/13/17 [History]


Magnesium 200 mg PO DAILY 06/17/19 [History]


Pantoprazole [Protonix] 40 mg PO BID 06/17/19 [History]


Pioglitazone HCl [Actos] 15 mg PO DAILY 06/17/19 [History]


Methimazole [Tapazole] 5 mg PO DAILY #30 tab 06/19/19 [Rx]


Sodium Bicarbonate Tab 650 mg PO QID #120 tab 07/16/19 [Rx]


Diltiazem HCl [Diltiazem 24Hr ER] 240 mg PO DAILY 09/22/19 [History]


Furosemide [Lasix] 40 mg PO DAILY 09/22/19 [History]


Insulin NPH Hum/Reg Insulin Hm [NovoLIN 70-30 100 UNIT/ML VIAL] 10 unit SQ BID 

09/22/19 [History]


Warfarin [Coumadin] 5 mg PO HS #30 tab 09/25/19 [Rx]








Follow up Appointment(s)/Referral(s): 


[facility] [Provider Group] - 10/03/19 10:00 am


(Thursday


Device check only. Follow up with doctor will be discussed at this time.)


Mckay Martinez MD [Primary Care Provider] - 10/01/19 10:00 am (Tuesday)


Erin Moore MD [STAFF PHYSICIAN] - 1 Week (Unable to get through at this time.  

Please call to schedule appointment)


Ambulatory/Diagnostic Orders: 


Prothrombin Time INR [LAB.AMB] Time Frame: 09/27/19, Facility: [facility], Location: [facility]


Patient Instructions/Handouts:  Pacemaker (DC)


Discharge Disposition: HOME WITH HOME HEALTH SERVICES

## 2019-09-25 NOTE — XR
EXAMINATION TYPE: XR chest 2V

 

DATE OF EXAM: 9/25/2019

 

COMPARISON: 9/22/2019

 

TECHNIQUE: PA and lateral views submitted.

 

HISTORY: Lead placement check

 

FINDINGS:

The lungs are clear and  there is no pneumothorax, pleural effusion, or focal pneumonia.  Heart is en
larged and there is a double lead pacemaker. No sizable pneumothorax. Linear change left upper lobe m
ost typical scar or atelectasis. Hypertrophic and degenerative changes spine. Atherosclerotic change 
aorta. Arthropathy of the shoulders with chronic right humeral deformity.

 

IMPRESSION: 

1. Pacemaker placement with no sizable pneumothorax.

## 2019-09-25 NOTE — P.PN
Subjective


Progress Note Date: 09/25/19





This 79-year-old female was admitted to the hospital with dizziness and evidence

of severe bradycardia.  She was also hyperkalemic.  Patient has history of 

tachybradycardia syndrome with episodes of atrial fibrillation with rapid 

ventricular response.  Patient had a permanent pacemaker yesterday which is a 

dual-chamber.  Patient tolerated the procedure well.  Pacemaker is functioning 

normally.  Chest x-ray shows no evidence of any complications.  Patient is being

discharged home.  She is advised not to take Aldactone.  She is back on Coumadin

5 mg.  She will have PT and INR done on Friday and we'll further adjust the dose

of Coumadin.  Patient is advised to call us if she develops any swelling, fever 

or chills.  She is advised to avoid any heavy lifting, pushing or pulling with 

the left arm.  Follow-up in the office in one week.





Objective





- Vital Signs


Vital signs: 


                                   Vital Signs











Temp  98.3 F   09/25/19 08:00


 


Pulse  67   09/25/19 12:00


 


Resp  16   09/25/19 12:00


 


BP  128/68   09/25/19 12:00


 


Pulse Ox  100   09/25/19 12:00








                                 Intake & Output











 09/24/19 09/25/19 09/25/19





 18:59 06:59 18:59


 


Intake Total 272  


 


Output Total 500 200 100


 


Balance -228 -200 -100


 


Weight  75.7 kg 


 


Intake:   


 


  IV 50  


 


  Oral 222  


 


Output:   


 


  Urine 500 200 100


 


Other:   


 


  Voiding Method Toilet Toilet Toilet


 


  # Voids 1 1 1


 


  # Bowel Movements 0  0














- Exam





GENERAL EXAM: Patient is alert and oriented and doesn't appear to be in any 

acute distress


HEENT: Normocephalic. Normal reaction of pupils, equal size, normal range of 

extraocular motion. No erythema or exudates in the throat.


NECK: No masses, no nuchal rigidity.


CHEST: No chest wall deformity.


LUNGS: Equal air entry with no crackles or wheeze.


HEART: S1 and S2 normal with no audible mumurs or gallops. Regular rhythm, fe

morals equal on both sides..


ABDOMEN: No hepatosplenomegaly, normal bowel sounds, no guarding or rigidity.


SKIN: No rashes


CENTRAL NERVOUS SYSTEM: No focal deficits.


EXTREMITIES: No cyanosis, clubbing or edema.





PROCEDURE SITE: Soft without any hematoma





- Labs


CBC & Chem 7: 


                                 09/23/19 02:40





                                 09/25/19 05:42


Labs: 


                  Abnormal Lab Results - Last 24 Hours (Table)











  09/24/19 09/24/19 09/25/19 Range/Units





  16:35 20:24 05:42 


 


PT    12.6 H  (9.0-12.0)  sec


 


INR    1.2 H  (<1.2)  


 


Sodium     (137-145)  mmol/L


 


Carbon Dioxide     (22-30)  mmol/L


 


BUN     (7-17)  mg/dL


 


Creatinine     (0.52-1.04)  mg/dL


 


Glucose     (74-99)  mg/dL


 


POC Glucose (mg/dL)  367 H  186 H   (75-99)  mg/dL














  09/25/19 09/25/19 09/25/19 Range/Units





  05:42 06:01 11:52 


 


PT     (9.0-12.0)  sec


 


INR     (<1.2)  


 


Sodium  136 L    (137-145)  mmol/L


 


Carbon Dioxide  21 L    (22-30)  mmol/L


 


BUN  46 H    (7-17)  mg/dL


 


Creatinine  1.26 H    (0.52-1.04)  mg/dL


 


Glucose  130 H    (74-99)  mg/dL


 


POC Glucose (mg/dL)   151 H  243 H  (75-99)  mg/dL














Assessment and Plan


(1) Sick sinus syndrome


Current Visit: Yes   Status: Acute   Code(s): I49.5 - SICK SINUS SYNDROME   

SNOMED Code(s): 25847846


   





(2) Paroxysmal atrial fibrillation


Current Visit: Yes   Status: Acute   Code(s): I48.0 - PAROXYSMAL ATRIAL 

FIBRILLATION   SNOMED Code(s): 471201310


   





(3) Near syncope


Current Visit: Yes   Status: Acute   Code(s): R55 - SYNCOPE AND COLLAPSE   

SNOMED Code(s): 666381379


   





(4) Chronic renal failure


Current Visit: Yes   Status: Acute   Code(s): N18.9 - CHRONIC KIDNEY DISEASE, 

UNSPECIFIED   SNOMED Code(s): 56442896


   





(5) Hyperthyroidism


Current Visit: No   Status: Acute   Code(s): E05.90 - THYROTOXICOSIS, UNSP 

WITHOUT THYROTOXIC CRISIS OR STORM   SNOMED Code(s): 36686271


   





(6) Presence of permanent cardiac pacemaker


Current Visit: Yes   Status: Acute   Code(s): Z95.0 - PRESENCE OF CARDIAC 

PACEMAKER   SNOMED Code(s): 833009869


   


Plan: 


Patient is critically stable.  Being discharged home.  Follow-up in the office 

in one week

## 2019-09-25 NOTE — PN
PROGRESS NOTE



The patient is seen for followup for chronic kidney disease and acute kidney injury.

Her renal function is fairly stable.  Creatinine is actually improved to 1.26 today.

The patient is maintained on IV fluids which were decreased yesterday.  She wants to go

home today.  She did have a pacemaker placed this admission.



PHYSICAL EXAMINATION:

On examination, blood pressure was 133/72, heart rate 71 per minute. Patient is

afebrile.

EXAMINATION OF THE HEART: S1, S2.

EXAMINATION OF THE LUNGS:  Bilateral breath sounds are heard.

Abdomen is soft, nontender.

Examination of the lower extremities shows no significant edema.



LABS:

Labs show sodium 136, potassium 4.7, BUN 46, serum creatinine 1.26.



ASSESSMENT:

1. 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

    function close to baseline.

2. Bradycardia, status post pacemaker placement.

3. Hypomagnesemia, currently replaced.

4. Mild hyperkalemia associated with type 4 RTA with diabetes, maintained on oral

    sodium bicarb.

5. Acute kidney injury, most likely prerenal, currently improved.



PLAN:

Patient can be discharged from nephrology standpoint.  Follow up as outpatient in 1 to

2 weeks.





MMODL / IJN: [id number] / Job#: [id number]

## 2019-11-03 ENCOUNTER — HOSPITAL ENCOUNTER (INPATIENT)
Dept: HOSPITAL 47 - EC | Age: 79
LOS: 2 days | Discharge: HOME HEALTH | DRG: 309 | End: 2019-11-05
Attending: FAMILY MEDICINE | Admitting: FAMILY MEDICINE
Payer: MEDICARE

## 2019-11-03 VITALS — BODY MASS INDEX: 31.3 KG/M2

## 2019-11-03 DIAGNOSIS — N18.3: ICD-10-CM

## 2019-11-03 DIAGNOSIS — R29.6: ICD-10-CM

## 2019-11-03 DIAGNOSIS — E87.5: ICD-10-CM

## 2019-11-03 DIAGNOSIS — I50.32: ICD-10-CM

## 2019-11-03 DIAGNOSIS — Z87.891: ICD-10-CM

## 2019-11-03 DIAGNOSIS — Z79.4: ICD-10-CM

## 2019-11-03 DIAGNOSIS — Z98.51: ICD-10-CM

## 2019-11-03 DIAGNOSIS — Z91.040: ICD-10-CM

## 2019-11-03 DIAGNOSIS — I13.0: ICD-10-CM

## 2019-11-03 DIAGNOSIS — Z98.42: ICD-10-CM

## 2019-11-03 DIAGNOSIS — F41.9: ICD-10-CM

## 2019-11-03 DIAGNOSIS — Z80.49: ICD-10-CM

## 2019-11-03 DIAGNOSIS — E78.5: ICD-10-CM

## 2019-11-03 DIAGNOSIS — I48.19: Primary | ICD-10-CM

## 2019-11-03 DIAGNOSIS — D72.829: ICD-10-CM

## 2019-11-03 DIAGNOSIS — Z79.899: ICD-10-CM

## 2019-11-03 DIAGNOSIS — E11.22: ICD-10-CM

## 2019-11-03 DIAGNOSIS — Z80.1: ICD-10-CM

## 2019-11-03 DIAGNOSIS — I27.20: ICD-10-CM

## 2019-11-03 DIAGNOSIS — R79.89: ICD-10-CM

## 2019-11-03 DIAGNOSIS — Z90.49: ICD-10-CM

## 2019-11-03 DIAGNOSIS — Z98.41: ICD-10-CM

## 2019-11-03 DIAGNOSIS — Z79.01: ICD-10-CM

## 2019-11-03 DIAGNOSIS — F32.9: ICD-10-CM

## 2019-11-03 DIAGNOSIS — E05.90: ICD-10-CM

## 2019-11-03 LAB
ALBUMIN SERPL-MCNC: 3.9 G/DL (ref 3.5–5)
ALP SERPL-CCNC: 131 U/L (ref 38–126)
ALT SERPL-CCNC: 26 U/L (ref 9–52)
ANION GAP SERPL CALC-SCNC: 10 MMOL/L
APTT BLD: 32.4 SEC (ref 22–30)
AST SERPL-CCNC: 40 U/L (ref 14–36)
BASOPHILS # BLD AUTO: 0.1 K/UL (ref 0–0.2)
BASOPHILS NFR BLD AUTO: 1 %
BUN SERPL-SCNC: 32 MG/DL (ref 7–17)
CALCIUM SPEC-MCNC: 9.4 MG/DL (ref 8.4–10.2)
CHLORIDE SERPL-SCNC: 105 MMOL/L (ref 98–107)
CK SERPL-CCNC: 43 U/L (ref 30–135)
CO2 SERPL-SCNC: 23 MMOL/L (ref 22–30)
EOSINOPHIL # BLD AUTO: 0.1 K/UL (ref 0–0.7)
EOSINOPHIL NFR BLD AUTO: 1 %
ERYTHROCYTE [DISTWIDTH] IN BLOOD BY AUTOMATED COUNT: 6.31 M/UL (ref 3.8–5.4)
ERYTHROCYTE [DISTWIDTH] IN BLOOD: 15.3 % (ref 11.5–15.5)
GLUCOSE BLD-MCNC: 235 MG/DL (ref 75–99)
GLUCOSE SERPL-MCNC: 167 MG/DL (ref 74–99)
HCT VFR BLD AUTO: 43.2 % (ref 34–46)
HGB BLD-MCNC: 13.1 GM/DL (ref 11.4–16)
HYALINE CASTS UR QL AUTO: 6 /LPF (ref 0–2)
INR PPP: 2.7 (ref ?–1.2)
LYMPHOCYTES # SPEC AUTO: 2.7 K/UL (ref 1–4.8)
LYMPHOCYTES NFR SPEC AUTO: 22 %
MAGNESIUM SPEC-SCNC: 1.7 MG/DL (ref 1.6–2.3)
MCH RBC QN AUTO: 20.8 PG (ref 25–35)
MCHC RBC AUTO-ENTMCNC: 30.5 G/DL (ref 31–37)
MCV RBC AUTO: 68.4 FL (ref 80–100)
MONOCYTES # BLD AUTO: 0.8 K/UL (ref 0–1)
MONOCYTES NFR BLD AUTO: 6 %
NEUTROPHILS # BLD AUTO: 8.6 K/UL (ref 1.3–7.7)
NEUTROPHILS NFR BLD AUTO: 69 %
PH UR: 6.5 [PH] (ref 5–8)
PLATELET # BLD AUTO: 317 K/UL (ref 150–450)
POTASSIUM SERPL-SCNC: 5.2 MMOL/L (ref 3.5–5.1)
PROT SERPL-MCNC: 7.2 G/DL (ref 6.3–8.2)
PROT UR QL: (no result)
PT BLD: 26.4 SEC (ref 9–12)
RBC UR QL: 33 /HPF (ref 0–5)
SODIUM SERPL-SCNC: 138 MMOL/L (ref 137–145)
SP GR UR: 1.01 (ref 1–1.03)
SQUAMOUS UR QL AUTO: <1 /HPF (ref 0–4)
UROBILINOGEN UR QL STRIP: <2 MG/DL (ref ?–2)
WBC # BLD AUTO: 12.5 K/UL (ref 3.8–10.6)

## 2019-11-03 PROCEDURE — 82550 ASSAY OF CK (CPK): CPT

## 2019-11-03 PROCEDURE — 36415 COLL VENOUS BLD VENIPUNCTURE: CPT

## 2019-11-03 PROCEDURE — 80053 COMPREHEN METABOLIC PANEL: CPT

## 2019-11-03 PROCEDURE — 96374 THER/PROPH/DIAG INJ IV PUSH: CPT

## 2019-11-03 PROCEDURE — 93306 TTE W/DOPPLER COMPLETE: CPT

## 2019-11-03 PROCEDURE — 93017 CV STRESS TEST TRACING ONLY: CPT

## 2019-11-03 PROCEDURE — 93005 ELECTROCARDIOGRAM TRACING: CPT

## 2019-11-03 PROCEDURE — 71046 X-RAY EXAM CHEST 2 VIEWS: CPT

## 2019-11-03 PROCEDURE — 81001 URINALYSIS AUTO W/SCOPE: CPT

## 2019-11-03 PROCEDURE — 70450 CT HEAD/BRAIN W/O DYE: CPT

## 2019-11-03 PROCEDURE — 83735 ASSAY OF MAGNESIUM: CPT

## 2019-11-03 PROCEDURE — 85610 PROTHROMBIN TIME: CPT

## 2019-11-03 PROCEDURE — 83880 ASSAY OF NATRIURETIC PEPTIDE: CPT

## 2019-11-03 PROCEDURE — 85025 COMPLETE CBC W/AUTO DIFF WBC: CPT

## 2019-11-03 PROCEDURE — 80048 BASIC METABOLIC PNL TOTAL CA: CPT

## 2019-11-03 PROCEDURE — 96376 TX/PRO/DX INJ SAME DRUG ADON: CPT

## 2019-11-03 PROCEDURE — 85730 THROMBOPLASTIN TIME PARTIAL: CPT

## 2019-11-03 PROCEDURE — 99285 EMERGENCY DEPT VISIT HI MDM: CPT

## 2019-11-03 PROCEDURE — 85027 COMPLETE CBC AUTOMATED: CPT

## 2019-11-03 PROCEDURE — 84484 ASSAY OF TROPONIN QUANT: CPT

## 2019-11-03 PROCEDURE — 78452 HT MUSCLE IMAGE SPECT MULT: CPT

## 2019-11-03 PROCEDURE — 84443 ASSAY THYROID STIM HORMONE: CPT

## 2019-11-03 RX ADMIN — Medication SCH MG: at 23:32

## 2019-11-03 NOTE — ED
General Adult HPI





- General


Chief complaint: Altered Mental Status


Stated complaint: Altered Mental Status


Time Seen by Provider: 19 16:58


Source: patient, EMS


Mode of arrival: EMS


Limitations: no limitations





- History of Present Illness


Initial comments: 





Patient presents to the ED by ambulance from her residence for evaluation.  Per 

EMS report, they were called secondary to the patient falling and for lift 

assistance.  Per EMS report, the patient had a brief syncopal episode as they 

were assisting her back into her bed.  Patient states that she recalls falling 

while attempting to get out of bed just prior to arrival to the ED today, but 

she does not recall having a syncopal episode.  Patient states that she has just

felt generally weak since falling.  Patient denies sustaining any injury from 

her fall.  Patient denies having any pain, fever or chills, headache, focal 

numbness/weakness/neuro deficit, neck/back/extremity pain, chest pain, dyspnea, 

cough or cold symptoms, palpitations, abdominal pain, nausea/vomiting/diarrhea, 

bloody or melanotic stool, dysuria or urinary symptoms, or any other symptoms or

complaints.





- Related Data


                                Home Medications











 Medication  Instructions  Recorded  Confirmed


 


Simvastatin [Zocor] 40 mg PO DAILY 14


 


LORazepam [Ativan] 0.5 mg PO HS PRN 17


 


Timolol [Betimol 0.5% Ophth Soln] 1 drop BOTH EYES DAILY 17


 


Magnesium 200 mg PO DAILY 19


 


Pantoprazole [Protonix] 40 mg PO BID 19


 


Pioglitazone HCl [Actos] 15 mg PO DAILY 19


 


Diltiazem HCl [Diltiazem 24Hr ER] 240 mg PO DAILY 19


 


Furosemide [Lasix] 40 mg PO DAILY 19


 


Insulin NPH Hum/Reg Insulin Hm 10 unit SQ BID 19





[NovoLIN 70-30 100 UNIT/ML VIAL]   








                                  Previous Rx's











 Medication  Instructions  Recorded


 


Methimazole [Tapazole] 5 mg PO DAILY #30 tab 19


 


Sodium Bicarbonate Tab 650 mg PO QID #120 tab 19


 


Warfarin [Coumadin] 5 mg PO HS #30 tab 19











                                    Allergies











Allergy/AdvReac Type Severity Reaction Status Date / Time


 


latex Allergy  Rash/Hives Verified 19 18:28














Review of Systems


ROS Statement: 


Those systems with pertinent positive or pertinent negative responses have been 

documented in the HPI.





ROS Other: All systems not noted in ROS Statement are negative.





Past Medical History


Past Medical History: Atrial Fibrillation, Diabetes Mellitus, Eye Disorder, 

GERD/Reflux, Hyperlipidemia, Hypertension, Osteoarthritis (OA), Renal Disease, 

Thyroid Disorder


Additional Past Medical History / Comment(s): afib/hyperthyroidism/echo was 55-

60%. Idiopathic pancreatitis, esophagitis, gastritis, hiatal hernia,  IDDM type 

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 

back, chronic R shoulder pain since fall/fracture,  prone to UTIs, bilateral 

glaucoma with surgery.  Severe pulmonary hypertension, hyperthyroidism.  Type IV

 renal tubular acidosis. Chronic anemia.


History of Any Multi-Drug Resistant Organisms: None Reported


Past Surgical History: Cholecystectomy, Heart Catheterization, Tonsillectomy, 

Tubal Ligation


Additional Past Surgical History / Comment(s): EGD with bx 17,   2010 

cardiac cath-normal, bilateral cataract removal, bilateral eye surgery for 

glaucoma, colonoscopy.


Past Anesthesia/Blood Transfusion Reactions: No Reported Reaction


Additional Past Anesthesia/Blood Transfusion Reaction / Comment(s): Pt has 

clausterphobia.


Past Psychological History: Anxiety, Depression


Smoking Status: Former smoker


Past Alcohol Use History: None Reported


Past Drug Use History: None Reported





- Past Family History


  ** Father


Family Medical History: Cancer


Additional Family Medical History / Comment(s): Father  at the age of 81 yrs

 of lung cancer.  He was a smoker.





  ** Mother


Family Medical History: Cancer


Additional Family Medical History / Comment(s): Mother had cervical cancer.  She

  at the age of 53 from her lupus.





General Exam


Limitations: no limitations


General appearance: alert, in no apparent distress


Head exam: Present: atraumatic, normocephalic


Eye exam: Present: normal appearance, PERRL, EOMI


ENT exam: Present: mucous membranes moist


Neck exam: Present: full ROM, other (Trachea is in midline).  Absent: tenderness


Respiratory exam: Present: normal lung sounds bilaterally.  Absent: respiratory 

distress, wheezes, rales, rhonchi


Cardiovascular Exam: Present: tachycardia, irregular rhythm, normal heart 

sounds, other (Normal radial pulses bilaterally)


GI/Abdominal exam: Present: soft.  Absent: distended, tenderness, guarding


Extremities exam: Present: full ROM, other (Pelvis is stable and nontender).  

Absent: tenderness, pedal edema, calf tenderness


Back exam: Absent: tenderness, paraspinal tenderness, vertebral tenderness


Neurological exam: Present: alert, other (Patient is oriented to person and 

place, but not to time.).  Absent: CN II-XII intact, motor sensory deficit


Psychiatric exam: Present: normal affect, normal mood


Skin exam: Present: warm, dry, intact, normal color





Course


                                   Vital Signs











  19





  17:02


 


Temperature 99.0 F


 


Pulse Rate 96


 


Respiratory 16





Rate 


 


Blood Pressure 129/96


 


O2 Sat by Pulse 97





Oximetry 














- Reevaluation(s)


Reevaluation #1: 





19 18:28


Case, H&P, test results and ED management were discussed with Dr. Lindsey.  

He accepts hospital floor admission.  He has no further recommendations at this 

time.


19 18:57


Patient remains in atrial fibrillation, and her heart rate is currently in the 

high 90s/low 100s.  Patient denies development of any new symptoms while in the 

ED.  Patient remains alert and breathing comfortably.  Patient is aware of her 

test results, and she agrees with hospital admission at this time.





EKG Findings





- EKG Comments:


EKG Findings:: Atrial fibrillation with RVR, ventricular rate of 114 bpm, normal

 QRS interval, normal QT interval, lateral ST and T-wave abnormality





Medical Decision Making





- Medical Decision Making





Given the patient's reported syncope, borderline rapid atrial fibrillation and 

mildly elevated troponin, will admit the patient to the hospital for observation

 and further treatment as needed.  Patient denies having any chest pain or any 

pain whatsoever.  Patient was given a dose of aspirin in the ED.





- Lab Data


Result diagrams: 


                                 19 17:33





                                 19 17:33


                                   Lab Results











  19 Range/Units





  16:30 17:33 17:33 


 


WBC   12.5 H   (3.8-10.6)  k/uL


 


RBC   6.31 H   (3.80-5.40)  m/uL


 


Hgb   13.1   (11.4-16.0)  gm/dL


 


Hct   43.2   (34.0-46.0)  %


 


MCV   68.4 L   (80.0-100.0)  fL


 


MCH   20.8 L   (25.0-35.0)  pg


 


MCHC   30.5 L   (31.0-37.0)  g/dL


 


RDW   15.3   (11.5-15.5)  %


 


Plt Count   317   (150-450)  k/uL


 


Neutrophils %   69   %


 


Lymphocytes %   22   %


 


Monocytes %   6   %


 


Eosinophils %   1   %


 


Basophils %   1   %


 


Neutrophils #   8.6 H   (1.3-7.7)  k/uL


 


Lymphocytes #   2.7   (1.0-4.8)  k/uL


 


Monocytes #   0.8   (0-1.0)  k/uL


 


Eosinophils #   0.1   (0-0.7)  k/uL


 


Basophils #   0.1   (0-0.2)  k/uL


 


Hypochromasia   Marked   


 


Microcytosis   Marked   


 


PT  26.4 H    (9.0-12.0)  sec


 


INR  2.7 H    (<1.2)  


 


APTT  32.4 H    (22.0-30.0)  sec


 


Sodium    138  (137-145)  mmol/L


 


Potassium    5.2 H  (3.5-5.1)  mmol/L


 


Chloride    105  ()  mmol/L


 


Carbon Dioxide    23  (22-30)  mmol/L


 


Anion Gap    10  mmol/L


 


BUN    32 H  (7-17)  mg/dL


 


Creatinine    1.64 H  (0.52-1.04)  mg/dL


 


Est GFR (CKD-EPI)AfAm    34  (>60 ml/min/1.73 sqM)  


 


Est GFR (CKD-EPI)NonAf    29  (>60 ml/min/1.73 sqM)  


 


Glucose    167 H  (74-99)  mg/dL


 


Calcium    9.4  (8.4-10.2)  mg/dL


 


Magnesium    1.7  (1.6-2.3)  mg/dL


 


Total Bilirubin    0.7  (0.2-1.3)  mg/dL


 


AST    40 H  (14-36)  U/L


 


ALT    26  (9-52)  U/L


 


Alkaline Phosphatase    131 H  ()  U/L


 


Creatine Kinase    43  ()  U/L


 


Troponin I     (0.000-0.034)  ng/mL


 


NT-Pro-B Natriuret Pep     pg/mL


 


Total Protein    7.2  (6.3-8.2)  g/dL


 


Albumin    3.9  (3.5-5.0)  g/dL














  19 Range/Units





  17:33 17:33 


 


WBC    (3.8-10.6)  k/uL


 


RBC    (3.80-5.40)  m/uL


 


Hgb    (11.4-16.0)  gm/dL


 


Hct    (34.0-46.0)  %


 


MCV    (80.0-100.0)  fL


 


MCH    (25.0-35.0)  pg


 


MCHC    (31.0-37.0)  g/dL


 


RDW    (11.5-15.5)  %


 


Plt Count    (150-450)  k/uL


 


Neutrophils %    %


 


Lymphocytes %    %


 


Monocytes %    %


 


Eosinophils %    %


 


Basophils %    %


 


Neutrophils #    (1.3-7.7)  k/uL


 


Lymphocytes #    (1.0-4.8)  k/uL


 


Monocytes #    (0-1.0)  k/uL


 


Eosinophils #    (0-0.7)  k/uL


 


Basophils #    (0-0.2)  k/uL


 


Hypochromasia    


 


Microcytosis    


 


PT    (9.0-12.0)  sec


 


INR    (<1.2)  


 


APTT    (22.0-30.0)  sec


 


Sodium    (137-145)  mmol/L


 


Potassium    (3.5-5.1)  mmol/L


 


Chloride    ()  mmol/L


 


Carbon Dioxide    (22-30)  mmol/L


 


Anion Gap    mmol/L


 


BUN    (7-17)  mg/dL


 


Creatinine    (0.52-1.04)  mg/dL


 


Est GFR (CKD-EPI)AfAm    (>60 ml/min/1.73 sqM)  


 


Est GFR (CKD-EPI)NonAf    (>60 ml/min/1.73 sqM)  


 


Glucose    (74-99)  mg/dL


 


Calcium    (8.4-10.2)  mg/dL


 


Magnesium    (1.6-2.3)  mg/dL


 


Total Bilirubin    (0.2-1.3)  mg/dL


 


AST    (14-36)  U/L


 


ALT    (9-52)  U/L


 


Alkaline Phosphatase    ()  U/L


 


Creatine Kinase    ()  U/L


 


Troponin I   0.043 H*  (0.000-0.034)  ng/mL


 


NT-Pro-B Natriuret Pep  1130   pg/mL


 


Total Protein    (6.3-8.2)  g/dL


 


Albumin    (3.5-5.0)  g/dL














- Radiology Data


Radiology results: report reviewed (CT head shows no acute abnormality), image 

reviewed (Chest x-ray shows left basilar atelectasis versus scarring)





Disposition


Clinical Impression: 


 Fall, Syncope, Atrial fibrillation, Elevated troponin





Disposition: ADMITTED AS IP TO THIS [facility]


Condition: Stable


Is patient prescribed a controlled substance at d/c from ED?: No


Referrals: 


Mckay Martinez MD [Primary Care Provider] - 1-2 days


Time of Disposition: 18:28

## 2019-11-03 NOTE — CT
EXAMINATION TYPE: CT brain wo con

 

DATE OF EXAM: 11/3/2019

 

COMPARISON: 9/15/2018

 

HISTORY: Syncope

 

CT DLP: mGycm

Automated exposure control for dose reduction was used.

 

FINDINGS: 

There is cerebral cortical atrophy. There is no mass effect nor midline shift. There is no sign of in
tracranial hemorrhage. The labrum is intact. There is no evidence of cerebral edema.

 

IMPRESSION: 

CEREBRAL ATROPHY AND MILD CHRONIC SMALL VESSEL ISCHEMIA. NO CHANGE. NO ACUTE ABNORMALITY.

## 2019-11-03 NOTE — XR
EXAMINATION TYPE: XR chest 2V

 

DATE OF EXAM: 11/3/2019

 

COMPARISON: 9/25/2019

 

HISTORY: Syncope

 

TECHNIQUE:  Frontal and lateral views of the chest are obtained.

 

FINDINGS:  There is small linear density left lower lobe. The other lung fields are clear. Heart is s
lightly enlarged. There is no heart failure. There is a left axillary pacemaker. There is no pleural 
effusion.

 

IMPRESSION:  Scarring or subsegmental atelectasis left lower lobe unchanged.

## 2019-11-03 NOTE — P.HPIM
History of Present Illness


H&P Date: 19





Patient is an 79-year-old female resident of [facility] living 

with a PMH of SRI. ellen (on Coumadin), hyperthyroidism on methimazole, CKD with RTA

secondary to diabetes mellitus, diastolic congestive heart failure, and type 2 

DM who presented to the ED after an episode of fall and possible loss of 

consciousness.  The patient does not recall the episode and states that over the

past few days, she had been feeling more tired than usual and had been spending 

more time in bed daily.  Today, as per usual, she had gone to the cafeteria to 

have some dinner, after which she was in bed and the next thing she remembers is

that she was on the ground at the bedside, unable to get up.  She activated her 

emergency bracelet and was found on the ground by the staff. EMS was activated 

and upon arrival, they were assisting her into bed when she was noted to have a 

brief episode of unrespsonsiveness, as per the documentation. The patient denies

having suffered any loss of consciousness and states that she recalls being 

helped into bed and then being brought to the ED for evaluation. She denied 

having any trauma from the fall. Denied any additional symptoms prior to or 

immediately after the event. Denied noticing chest discomfort, SOB, 

palpitations. Denied nausea, vomiting, abdominal pain, fever, or chills. Denied 

urinary complaints, headache, or visual disturbances. 





She underwent an extensive evaluation in the ED w/ initial presenting vital 

signs wnl. EKG revealed A fib w/ RVR @ 114 bpm w/ biphasic t-waves in leads V4-

V6 and T-wave flattening in lead I. Head CT revealed chronic small vessel 

ischemia with no acute changes and CXR revealed a slightly enlarged heart w/ LLL

atelectasis unchanged from prior. Laboratory evaluation revealed a WBC of 125, 

INR 2.7, Troponin of 0.043, BNP of 1130, BUN 32, and Cr 1.64 (similar to 

baseline), w/ K 5.2. She is admitted to the medicine service for further 

management of A-fib w/ RVR w/ ?syncope. 





Review of Systems





Pertinent positives and negatives as discussed in HPI, a complete review of 

systems was performed and all other systems are negative.





Past Medical History


Past Medical History: Atrial Fibrillation, Diabetes Mellitus, Eye Disorder, 

GERD/Reflux, Hyperlipidemia, Hypertension, Osteoarthritis (OA), Renal Disease, 

Thyroid Disorder


Additional Past Medical History / Comment(s): afib/hyperthyroidism/echo was 55-

60%. Idiopathic pancreatitis, esophagitis, gastritis, hiatal hernia,  IDDM type 

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 

back, chronic R shoulder pain since fall/fracture,  prone to UTIs, bilateral 

glaucoma with surgery.  Severe pulmonary hypertension, hyperthyroidism.  Type IV

renal tubular acidosis. Chronic anemia.


History of Any Multi-Drug Resistant Organisms: None Reported


Past Surgical History: Cholecystectomy, Heart Catheterization, Tonsillectomy, 

Tubal Ligation


Additional Past Surgical History / Comment(s): EGD with bx 17,   2010 

cardiac cath-normal, bilateral cataract removal, bilateral eye surgery for 

glaucoma, colonoscopy.


Past Anesthesia/Blood Transfusion Reactions: No Reported Reaction


Additional Past Anesthesia/Blood Transfusion Reaction / Comment(s): Pt has 

clausterphobia.


Past Psychological History: Anxiety, Depression


Smoking Status: Former smoker


Past Alcohol Use History: None Reported


Past Drug Use History: None Reported





- Past Family History


  ** Father


Family Medical History: Cancer


Additional Family Medical History / Comment(s): Father  at the age of 81 yrs

of lung cancer.  He was a smoker.





  ** Mother


Family Medical History: Cancer


Additional Family Medical History / Comment(s): Mother had cervical cancer.  She

 at the age of 53 from her lupus.





Medications and Allergies


                                Home Medications











 Medication  Instructions  Recorded  Confirmed  Type


 


Simvastatin [Zocor] 40 mg PO DAILY 14 History


 


LORazepam [Ativan] 0.5 mg PO HS PRN 17 History


 


Timolol [Betimol 0.5% Ophth Soln] 1 drop BOTH EYES DAILY 17 

History


 


Magnesium 200 mg PO DAILY 19 History


 


Pantoprazole [Protonix] 40 mg PO BID 19 History


 


Pioglitazone HCl [Actos] 15 mg PO DAILY 19 History


 


Methimazole [Tapazole] 5 mg PO DAILY #30 tab 19 Rx


 


Sodium Bicarbonate Tab 650 mg PO QID #120 tab 19 Rx


 


Diltiazem HCl [Diltiazem 24Hr ER] 240 mg PO DAILY 19 History


 


Furosemide [Lasix] 40 mg PO DAILY 19 History


 


Insulin NPH Hum/Reg Insulin Hm 10 unit SQ BID 19 History





[NovoLIN 70-30 100 UNIT/ML VIAL]    


 


Warfarin [Coumadin] 5 mg PO HS #30 tab 19  Rx








                                    Allergies











Allergy/AdvReac Type Severity Reaction Status Date / Time


 


latex Allergy  Rash/Hives Verified 19 18:28














Physical Exam


Vitals: 


                                   Vital Signs











  Temp Pulse Resp BP Pulse Ox


 


 19 19:03   101 H  18  136/87  95


 


 19 17:02  99.0 F  96  16  129/96  97








                                Intake and Output











 19





 06:59 14:59 22:59


 


Other:   


 


  Weight   79.379 kg














General: non toxic, no distress, appears at stated age, obese


Derm: no unusual rashes/lesions no unusual ecchymoses, warm, dry


Head: atraumatic, normocephalic, symmetric


Eyes: EOMI, no lid lag, anicteric sclera, pupils equal round reactive to light


ENT: Nose and ears atraumatic, no thrush,  no pharyngeal erythema


Neck: No thyromegaly, no cervical lymphadenopathy, trachea midline, supple


Mouth: no lip lesion, mucus membranes moist


Cardiovascular: Irregularly irregular, tachycardic, no murmur, positive 

posterior tibial pulse bilateral, no edema, capillary refill less than 2 seconds


Lungs: CTA bilateral, no rhonchi, no rales , no accessory muscle use


Abdominal: soft,  nontender to palpation, no guarding, no appreciable 

organomegaly, normal bowel sounds


Ext: no gross muscle atrophy,  muscle strength 5 out of 5 in all 4 extremities 

grossly, no contractures, 


Neuro:  CN II-XI grossly intact, light touch intact all 4 extremities, finger to

 nose within normal limits,


Psych: Alert, oriented, appropriate affect 





Results


CBC & Chem 7: 


                                 19 17:33





                                 19 17:33


Labs: 


                  Abnormal Lab Results - Last 24 Hours (Table)











  19 Range/Units





  16:30 17:33 17:33 


 


WBC   12.5 H   (3.8-10.6)  k/uL


 


RBC   6.31 H   (3.80-5.40)  m/uL


 


MCV   68.4 L   (80.0-100.0)  fL


 


MCH   20.8 L   (25.0-35.0)  pg


 


MCHC   30.5 L   (31.0-37.0)  g/dL


 


Neutrophils #   8.6 H   (1.3-7.7)  k/uL


 


PT  26.4 H    (9.0-12.0)  sec


 


INR  2.7 H    (<1.2)  


 


APTT  32.4 H    (22.0-30.0)  sec


 


Potassium    5.2 H  (3.5-5.1)  mmol/L


 


BUN    32 H  (7-17)  mg/dL


 


Creatinine    1.64 H  (0.52-1.04)  mg/dL


 


Glucose    167 H  (74-99)  mg/dL


 


AST    40 H  (14-36)  U/L


 


Alkaline Phosphatase    131 H  ()  U/L


 


Troponin I     (0.000-0.034)  ng/mL














  19 Range/Units





  17:33 


 


WBC   (3.8-10.6)  k/uL


 


RBC   (3.80-5.40)  m/uL


 


MCV   (80.0-100.0)  fL


 


MCH   (25.0-35.0)  pg


 


MCHC   (31.0-37.0)  g/dL


 


Neutrophils #   (1.3-7.7)  k/uL


 


PT   (9.0-12.0)  sec


 


INR   (<1.2)  


 


APTT   (22.0-30.0)  sec


 


Potassium   (3.5-5.1)  mmol/L


 


BUN   (7-17)  mg/dL


 


Creatinine   (0.52-1.04)  mg/dL


 


Glucose   (74-99)  mg/dL


 


AST   (14-36)  U/L


 


Alkaline Phosphatase   ()  U/L


 


Troponin I  0.043 H*  (0.000-0.034)  ng/mL














Assessment and Plan


Plan: 


Afib w/ RVR


-Chronic monitoring


-Cardiology consult


-Continue with Coumadin and Cardizem home dose for now


-Check TSH levels (hx of hyperthyroidism -- on methimazole)





Brief episode of unresponsiveness


-Likely due to orthostatic changes once patient being lifted up vs Afib 


-No seizure-like activity noted, no post-ictal confusion/tongue bites


-C/w cardiac monitoring for now


-Obtain orthostatic vitals





Troponin elevation


-Likely due to Afib w/ RVR


-Trend troponin


-Cardiac monitoring, cardiology consulted





Hyperkalemia


-Treat w/ Calcium gluconate


-Will give Kayexalate and monitor





Leukocytosis


-No active signs of infection at this time


-Monitor for now





CKD stage III


-At baseline





Diastolic CHF, not in acute exacerbation


-C/w home dose of lasix





Hyperthyroidism


-C/w home dose of Methimazole





Type 2 DM


-C/w home insulin doses


-Hold oral hypoglycemics





DVT prophylaxis


-Coumadin





The patient is admitted with an anticipated greater than 2 midnight stay for 

evaluation of Afib w/ RVR


CODE STATUS: Full Code


Discussed with: Patient


Anticipated discharge date: 2-3 days


Anticipated discharge place: Assisted living facility


A total of 40 minutes was spent on the care of this complex patient more than 

50% of the time was spent in counseling and care coordination.

## 2019-11-04 LAB
ALBUMIN SERPL-MCNC: 3.6 G/DL (ref 3.5–5)
ALP SERPL-CCNC: 118 U/L (ref 38–126)
ALT SERPL-CCNC: 22 U/L (ref 9–52)
ANION GAP SERPL CALC-SCNC: 9 MMOL/L
AST SERPL-CCNC: 43 U/L (ref 14–36)
BASOPHILS # BLD AUTO: 0.1 K/UL (ref 0–0.2)
BASOPHILS NFR BLD AUTO: 1 %
BUN SERPL-SCNC: 39 MG/DL (ref 7–17)
CALCIUM SPEC-MCNC: 9.2 MG/DL (ref 8.4–10.2)
CHLORIDE SERPL-SCNC: 105 MMOL/L (ref 98–107)
CO2 SERPL-SCNC: 21 MMOL/L (ref 22–30)
EOSINOPHIL # BLD AUTO: 0.1 K/UL (ref 0–0.7)
EOSINOPHIL NFR BLD AUTO: 1 %
ERYTHROCYTE [DISTWIDTH] IN BLOOD BY AUTOMATED COUNT: 5.7 M/UL (ref 3.8–5.4)
ERYTHROCYTE [DISTWIDTH] IN BLOOD: 15.3 % (ref 11.5–15.5)
GLUCOSE BLD-MCNC: 204 MG/DL (ref 75–99)
GLUCOSE BLD-MCNC: 214 MG/DL (ref 75–99)
GLUCOSE BLD-MCNC: 287 MG/DL (ref 75–99)
GLUCOSE BLD-MCNC: 72 MG/DL (ref 75–99)
GLUCOSE SERPL-MCNC: 200 MG/DL (ref 74–99)
HCT VFR BLD AUTO: 39.3 % (ref 34–46)
HGB BLD-MCNC: 11.8 GM/DL (ref 11.4–16)
INR PPP: 2.5 (ref ?–1.2)
LYMPHOCYTES # SPEC AUTO: 4.1 K/UL (ref 1–4.8)
LYMPHOCYTES NFR SPEC AUTO: 41 %
MCH RBC QN AUTO: 20.6 PG (ref 25–35)
MCHC RBC AUTO-ENTMCNC: 29.9 G/DL (ref 31–37)
MCV RBC AUTO: 68.9 FL (ref 80–100)
MONOCYTES # BLD AUTO: 0.7 K/UL (ref 0–1)
MONOCYTES NFR BLD AUTO: 7 %
NEUTROPHILS # BLD AUTO: 4.7 K/UL (ref 1.3–7.7)
NEUTROPHILS NFR BLD AUTO: 47 %
PLATELET # BLD AUTO: 255 K/UL (ref 150–450)
POTASSIUM SERPL-SCNC: 4.5 MMOL/L (ref 3.5–5.1)
PROT SERPL-MCNC: 6.6 G/DL (ref 6.3–8.2)
PT BLD: 24.1 SEC (ref 9–12)
SODIUM SERPL-SCNC: 135 MMOL/L (ref 137–145)
WBC # BLD AUTO: 10 K/UL (ref 3.8–10.6)

## 2019-11-04 RX ADMIN — PANTOPRAZOLE SODIUM SCH MG: 40 TABLET, DELAYED RELEASE ORAL at 14:02

## 2019-11-04 RX ADMIN — INSULIN ASPART SCH UNIT: 100 INJECTION, SOLUTION INTRAVENOUS; SUBCUTANEOUS at 07:12

## 2019-11-04 RX ADMIN — Medication SCH: at 14:02

## 2019-11-04 RX ADMIN — TIMOLOL MALEATE SCH DROPS: 5 SOLUTION OPHTHALMIC at 14:03

## 2019-11-04 RX ADMIN — METHIMAZOLE SCH: 5 TABLET ORAL at 14:02

## 2019-11-04 RX ADMIN — FUROSEMIDE SCH MG: 40 TABLET ORAL at 14:02

## 2019-11-04 RX ADMIN — INSULIN ASPART SCH: 100 INJECTION, SOLUTION INTRAVENOUS; SUBCUTANEOUS at 12:25

## 2019-11-04 RX ADMIN — Medication SCH MG: at 17:10

## 2019-11-04 RX ADMIN — ATORVASTATIN CALCIUM SCH MG: 20 TABLET, FILM COATED ORAL at 14:02

## 2019-11-04 RX ADMIN — METOPROLOL SUCCINATE SCH MG: 50 TABLET, EXTENDED RELEASE ORAL at 14:06

## 2019-11-04 RX ADMIN — INSULIN ASPART SCH UNIT: 100 INJECTION, SUSPENSION SUBCUTANEOUS at 07:13

## 2019-11-04 RX ADMIN — INSULIN ASPART SCH UNIT: 100 INJECTION, SOLUTION INTRAVENOUS; SUBCUTANEOUS at 17:11

## 2019-11-04 RX ADMIN — Medication SCH MG: at 21:42

## 2019-11-04 RX ADMIN — Medication SCH MG: at 14:03

## 2019-11-04 RX ADMIN — DILTIAZEM HYDROCHLORIDE SCH MG: 240 CAPSULE, COATED, EXTENDED RELEASE ORAL at 14:02

## 2019-11-04 RX ADMIN — INSULIN ASPART SCH UNIT: 100 INJECTION, SUSPENSION SUBCUTANEOUS at 17:21

## 2019-11-04 NOTE — NM
EXAMINATION TYPE: NM stress persantine cardiolit

 

DATE OF EXAM: 11/4/2019

 

COMPARISON: NONE

 

HISTORY: Chest pain

 

TECHNIQUE:  After the intravenous administration of 9.03 mCi Tc 99m Sestamibi - Cardiolite resting SP
ECT images acquired 45 minutes post injection. 

 

The patient received 44 mg Persantine, 25.3 mCi Tc 99m Sestamibi - Stress images obtained 30 minutes 
post injection 

 

FINDINGS:  

 

Review of stress and rest SPECT images demonstrates no distinct perfusion abnormality.  Gated analysi
s shows normal wall motion with an estimated left ventricular ejection fraction of 48 %.

 

 

 

IMPRESSION:  

 

No scintigraphic evidence for reversible ischemia.

## 2019-11-04 NOTE — P.PN
Subjective


Progress Note Date: 11/04/19


Principal diagnosis: 





A. fib with RVR, syncope





Patient was seen and examined.  No acute events overnight.  Patient reports 

slight improvement in her breathing but continues to complain of shortness of 

breath especially with exertion.  She denies any dizziness, chest pain or palpi

tations.  No nausea or vomiting.  No fever or chills.





Objective





- Vital Signs


Vital signs: 


                                   Vital Signs











Temp  97.0 F L  11/04/19 15:36


 


Pulse  89   11/04/19 15:38


 


Resp  18   11/04/19 15:38


 


BP  135/96   11/04/19 15:36


 


Pulse Ox  94 L  11/04/19 15:36








                                 Intake & Output











 11/03/19 11/04/19 11/04/19





 18:59 06:59 18:59


 


Intake Total  500 260


 


Output Total  300 950


 


Balance  200 -690


 


Weight 79.379 kg 77.5 kg 77.111 kg


 


Intake:   


 


  Intake, IV Titration  100 





  Amount   


 


    Calcium Gluconate 1 gm In  100 





    Sodium Chloride 0.9% 100   





    ml @ 100 mls/hr IVPB   





    ONCE ONE Rx#:[id number]   


 


  Oral  400 260


 


Output:   


 


  Urine  300 950


 


Other:   


 


  Voiding Method  Toilet Toilet


 


  # Voids   1














- Exam





General: [non toxic], [no distress], [appears at stated age]


Derm: [warm], [dry]


Head: [atraumatic], [normocephalic], [symmetric]


Eyes: [EOMI], [no lid lag], [anicteric sclera]


Mouth: [no lip lesion], [mucus membranes moist]


Cardiovascular: [S1S2 reg], [irregularly regular], [positive DP pulse 

bilateral], 


Lungs: [CTA bilateral], [no rhonchi, no rales] , [no accessory muscle use]


Abdominal: [soft], [ nontender to palpation], [no guarding], [no appreciable 

organomegaly]


Ext: [no gross muscle atrophy], [no edema], [no contractures]


Neuro:  [no focal neuro deficits]


Psych: [Alert], [oriented], [appropriate affect] 





- Labs


CBC & Chem 7: 


                                 11/04/19 04:56





                                 11/04/19 04:56


Labs: 


                  Abnormal Lab Results - Last 24 Hours (Table)











  11/03/19 11/03/19 11/03/19 Range/Units





  16:30 17:33 17:33 


 


WBC   12.5 H   (3.8-10.6)  k/uL


 


RBC   6.31 H   (3.80-5.40)  m/uL


 


MCV   68.4 L   (80.0-100.0)  fL


 


MCH   20.8 L   (25.0-35.0)  pg


 


MCHC   30.5 L   (31.0-37.0)  g/dL


 


Neutrophils #   8.6 H   (1.3-7.7)  k/uL


 


PT  26.4 H    (9.0-12.0)  sec


 


INR  2.7 H    (<1.2)  


 


APTT  32.4 H    (22.0-30.0)  sec


 


Sodium     (137-145)  mmol/L


 


Potassium    5.2 H  (3.5-5.1)  mmol/L


 


Carbon Dioxide     (22-30)  mmol/L


 


BUN    32 H  (7-17)  mg/dL


 


Creatinine    1.64 H  (0.52-1.04)  mg/dL


 


Glucose    167 H  (74-99)  mg/dL


 


POC Glucose (mg/dL)     (75-99)  mg/dL


 


AST    40 H  (14-36)  U/L


 


Alkaline Phosphatase    131 H  ()  U/L


 


Troponin I     (0.000-0.034)  ng/mL


 


Urine Protein     (Negative)  


 


Urine Glucose (UA)     (Negative)  


 


Urine Blood     (Negative)  


 


Urine RBC     (0-5)  /hpf


 


Urine WBC     (0-5)  /hpf


 


Hyaline Casts     (0-2)  /lpf


 


Urine Mucus     (None)  /hpf














  11/03/19 11/03/19 11/03/19 Range/Units





  17:33 19:40 21:21 


 


WBC     (3.8-10.6)  k/uL


 


RBC     (3.80-5.40)  m/uL


 


MCV     (80.0-100.0)  fL


 


MCH     (25.0-35.0)  pg


 


MCHC     (31.0-37.0)  g/dL


 


Neutrophils #     (1.3-7.7)  k/uL


 


PT     (9.0-12.0)  sec


 


INR     (<1.2)  


 


APTT     (22.0-30.0)  sec


 


Sodium     (137-145)  mmol/L


 


Potassium     (3.5-5.1)  mmol/L


 


Carbon Dioxide     (22-30)  mmol/L


 


BUN     (7-17)  mg/dL


 


Creatinine     (0.52-1.04)  mg/dL


 


Glucose     (74-99)  mg/dL


 


POC Glucose (mg/dL)    235 H  (75-99)  mg/dL


 


AST     (14-36)  U/L


 


Alkaline Phosphatase     ()  U/L


 


Troponin I  0.043 H*    (0.000-0.034)  ng/mL


 


Urine Protein   1+ H   (Negative)  


 


Urine Glucose (UA)   Trace H   (Negative)  


 


Urine Blood   Small H   (Negative)  


 


Urine RBC   33 H   (0-5)  /hpf


 


Urine WBC   8 H   (0-5)  /hpf


 


Hyaline Casts   6 H   (0-2)  /lpf


 


Urine Mucus   Rare H   (None)  /hpf














  11/03/19 11/04/19 11/04/19 Range/Units





  22:59 04:56 04:56 


 


WBC     (3.8-10.6)  k/uL


 


RBC   5.70 H   (3.80-5.40)  m/uL


 


MCV   68.9 L   (80.0-100.0)  fL


 


MCH   20.6 L   (25.0-35.0)  pg


 


MCHC   29.9 L   (31.0-37.0)  g/dL


 


Neutrophils #     (1.3-7.7)  k/uL


 


PT     (9.0-12.0)  sec


 


INR     (<1.2)  


 


APTT     (22.0-30.0)  sec


 


Sodium    135 L  (137-145)  mmol/L


 


Potassium     (3.5-5.1)  mmol/L


 


Carbon Dioxide    21 L  (22-30)  mmol/L


 


BUN    39 H  (7-17)  mg/dL


 


Creatinine    1.59 H  (0.52-1.04)  mg/dL


 


Glucose    200 H  (74-99)  mg/dL


 


POC Glucose (mg/dL)     (75-99)  mg/dL


 


AST    43 H  (14-36)  U/L


 


Alkaline Phosphatase     ()  U/L


 


Troponin I  0.052 H*    (0.000-0.034)  ng/mL


 


Urine Protein     (Negative)  


 


Urine Glucose (UA)     (Negative)  


 


Urine Blood     (Negative)  


 


Urine RBC     (0-5)  /hpf


 


Urine WBC     (0-5)  /hpf


 


Hyaline Casts     (0-2)  /lpf


 


Urine Mucus     (None)  /hpf














  11/04/19 11/04/19 11/04/19 Range/Units





  04:56 04:56 07:01 


 


WBC     (3.8-10.6)  k/uL


 


RBC     (3.80-5.40)  m/uL


 


MCV     (80.0-100.0)  fL


 


MCH     (25.0-35.0)  pg


 


MCHC     (31.0-37.0)  g/dL


 


Neutrophils #     (1.3-7.7)  k/uL


 


PT   24.1 H   (9.0-12.0)  sec


 


INR   2.5 H   (<1.2)  


 


APTT     (22.0-30.0)  sec


 


Sodium     (137-145)  mmol/L


 


Potassium     (3.5-5.1)  mmol/L


 


Carbon Dioxide     (22-30)  mmol/L


 


BUN     (7-17)  mg/dL


 


Creatinine     (0.52-1.04)  mg/dL


 


Glucose     (74-99)  mg/dL


 


POC Glucose (mg/dL)    204 H  (75-99)  mg/dL


 


AST     (14-36)  U/L


 


Alkaline Phosphatase     ()  U/L


 


Troponin I  0.049 H*    (0.000-0.034)  ng/mL


 


Urine Protein     (Negative)  


 


Urine Glucose (UA)     (Negative)  


 


Urine Blood     (Negative)  


 


Urine RBC     (0-5)  /hpf


 


Urine WBC     (0-5)  /hpf


 


Hyaline Casts     (0-2)  /lpf


 


Urine Mucus     (None)  /hpf














  11/04/19 Range/Units





  11:45 


 


WBC   (3.8-10.6)  k/uL


 


RBC   (3.80-5.40)  m/uL


 


MCV   (80.0-100.0)  fL


 


MCH   (25.0-35.0)  pg


 


MCHC   (31.0-37.0)  g/dL


 


Neutrophils #   (1.3-7.7)  k/uL


 


PT   (9.0-12.0)  sec


 


INR   (<1.2)  


 


APTT   (22.0-30.0)  sec


 


Sodium   (137-145)  mmol/L


 


Potassium   (3.5-5.1)  mmol/L


 


Carbon Dioxide   (22-30)  mmol/L


 


BUN   (7-17)  mg/dL


 


Creatinine   (0.52-1.04)  mg/dL


 


Glucose   (74-99)  mg/dL


 


POC Glucose (mg/dL)  72 L  (75-99)  mg/dL


 


AST   (14-36)  U/L


 


Alkaline Phosphatase   ()  U/L


 


Troponin I   (0.000-0.034)  ng/mL


 


Urine Protein   (Negative)  


 


Urine Glucose (UA)   (Negative)  


 


Urine Blood   (Negative)  


 


Urine RBC   (0-5)  /hpf


 


Urine WBC   (0-5)  /hpf


 


Hyaline Casts   (0-2)  /lpf


 


Urine Mucus   (None)  /hpf














Assessment and Plan


Assessment: 





Afib w/ RVR


- Telemetry monitoring


- Stress test negative


- Continue diltiazem, home dose and metoprolol added by cardiology.


- Follow cardiology recommendations


- TSH within normal limits





Brief episode of unresponsiveness


- Likely due to orthostatic changes once patient being lifted up vs Afib 


- No seizure-like activity noted, no post-ictal confusion/tongue bites


- C/w telemetry monitoring for now


- Obtain orthostatic vitals





Troponin elevation


- Likely due to Afib w/ RVR


- Troponin 0.043, 0.052, 0.049 


- Stress test negative


- Follow cardiology recommendations





CKD stage III


- At baseline





Diastolic CHF, not in acute exacerbation


-C/w home dose of lasix





Hyperthyroidism


- C/w home dose of Methimazole





Type 2 DM


- C/w home insulin doses


- Hold oral hypoglycemics





DVT prophylaxis


-Coumadin

## 2019-11-05 VITALS — DIASTOLIC BLOOD PRESSURE: 56 MMHG | SYSTOLIC BLOOD PRESSURE: 116 MMHG | HEART RATE: 72 BPM | TEMPERATURE: 98.2 F

## 2019-11-05 VITALS — RESPIRATION RATE: 16 BRPM

## 2019-11-05 LAB
ANION GAP SERPL CALC-SCNC: 11 MMOL/L
BUN SERPL-SCNC: 35 MG/DL (ref 7–17)
CALCIUM SPEC-MCNC: 9.2 MG/DL (ref 8.4–10.2)
CHLORIDE SERPL-SCNC: 104 MMOL/L (ref 98–107)
CO2 SERPL-SCNC: 24 MMOL/L (ref 22–30)
ERYTHROCYTE [DISTWIDTH] IN BLOOD BY AUTOMATED COUNT: 5.67 M/UL (ref 3.8–5.4)
ERYTHROCYTE [DISTWIDTH] IN BLOOD: 15.1 % (ref 11.5–15.5)
GLUCOSE BLD-MCNC: 158 MG/DL (ref 75–99)
GLUCOSE BLD-MCNC: 182 MG/DL (ref 75–99)
GLUCOSE BLD-MCNC: 268 MG/DL (ref 75–99)
GLUCOSE SERPL-MCNC: 165 MG/DL (ref 74–99)
HCT VFR BLD AUTO: 38.3 % (ref 34–46)
HGB BLD-MCNC: 11.7 GM/DL (ref 11.4–16)
INR PPP: 2.2 (ref ?–1.2)
MAGNESIUM SPEC-SCNC: 1.5 MG/DL (ref 1.6–2.3)
MCH RBC QN AUTO: 20.6 PG (ref 25–35)
MCHC RBC AUTO-ENTMCNC: 30.5 G/DL (ref 31–37)
MCV RBC AUTO: 67.6 FL (ref 80–100)
PLATELET # BLD AUTO: 274 K/UL (ref 150–450)
POTASSIUM SERPL-SCNC: 3.7 MMOL/L (ref 3.5–5.1)
PT BLD: 21.2 SEC (ref 9–12)
SODIUM SERPL-SCNC: 139 MMOL/L (ref 137–145)
WBC # BLD AUTO: 9.4 K/UL (ref 3.8–10.6)

## 2019-11-05 RX ADMIN — PANTOPRAZOLE SODIUM SCH MG: 40 TABLET, DELAYED RELEASE ORAL at 08:59

## 2019-11-05 RX ADMIN — TIMOLOL MALEATE SCH DROPS: 5 SOLUTION OPHTHALMIC at 08:59

## 2019-11-05 RX ADMIN — MAGNESIUM SULFATE IN DEXTROSE SCH MLS/HR: 10 INJECTION, SOLUTION INTRAVENOUS at 09:00

## 2019-11-05 RX ADMIN — Medication SCH MG: at 08:59

## 2019-11-05 RX ADMIN — INSULIN ASPART SCH UNIT: 100 INJECTION, SUSPENSION SUBCUTANEOUS at 07:05

## 2019-11-05 RX ADMIN — FUROSEMIDE SCH MG: 40 TABLET ORAL at 08:59

## 2019-11-05 RX ADMIN — METHIMAZOLE SCH: 5 TABLET ORAL at 08:59

## 2019-11-05 RX ADMIN — DILTIAZEM HYDROCHLORIDE SCH MG: 240 CAPSULE, COATED, EXTENDED RELEASE ORAL at 08:59

## 2019-11-05 RX ADMIN — INSULIN ASPART SCH UNIT: 100 INJECTION, SOLUTION INTRAVENOUS; SUBCUTANEOUS at 07:05

## 2019-11-05 RX ADMIN — INSULIN ASPART SCH UNIT: 100 INJECTION, SOLUTION INTRAVENOUS; SUBCUTANEOUS at 12:15

## 2019-11-05 RX ADMIN — MAGNESIUM SULFATE IN DEXTROSE SCH MLS/HR: 10 INJECTION, SOLUTION INTRAVENOUS at 10:45

## 2019-11-05 RX ADMIN — METOPROLOL SUCCINATE SCH MG: 50 TABLET, EXTENDED RELEASE ORAL at 08:59

## 2019-11-05 RX ADMIN — Medication SCH MG: at 12:15

## 2019-11-05 RX ADMIN — ATORVASTATIN CALCIUM SCH MG: 20 TABLET, FILM COATED ORAL at 08:59

## 2019-11-05 NOTE — EST
EXERCISE STRESS



AGE:

79



SEX:

Female



HT:

62"



WT:

170 pounds



PROTOCOL:

Persantine Cardiolite



STAGE:



DURATION OF EXERCISE:



HEART RATE REST:

120



BLOOD PRESSURE REST:

96/86



MAXIMUM HEART RATE ACHIEVED:

111



MAXIMUM BLOOD PRESSURE:

97/75



85% MPHR:

119



100% MPHR:

140



METS:



INDICATIONS:

Chest pain.



CLINICAL INFORMATION:

This is a Persantine stress test.



Baseline EKG revealed atrial fib with moderate ventricular rate.  With

_____administration, heart rate changed from 120 to 111 beats per minute. Blood

pressure changed from 96/60 and remained about the same.  Nonspecific ST changes were

seen throughout.  By EKG criteria, this is an inconclusive stress test with resting EKG

changes.  The nuclear scan results which are more pertinent will be reported by the

radiologist.  Blood pressure recordings were inaccurate.



By EKG criteria, this is an inconclusive _____stress test.  The nuclear scan results

which are more pertinent will be reported by the radiologist.





DENNIS / JONON: [id number] / Job#: [id number]

## 2019-11-05 NOTE — P.PN
Subjective


Progress Note Date: 11/05/19


This is an 79-year-old female, who resides at [facility] assisted living, 

history of persistent atrial fibrillation, hyperthyroidism, chronic kidney 

disease, diabetes, hyperlipidemia.  Resented to the hospital with brief episode 

of unresponsiveness, patient was seen in consultation yesterday by Dr. Pollock, 

she was noted to have mild troponin abnormality.  Patient underwent a Persantine

stress test that was negative for any reversible ischemia.  She also had an 

echocardiogram with Doppler study performed which is yet pending.  Blood 

pressure 114/60 with a heart rate in the 80s, 95% on room air.  White blood cell

count 9.4, hemoglobin 11.7, platelet count 274.  Sodium 139, potassium 3.7, BUN 

35 and creatinine 1.5, magnesium 1.5 today.  Still has some mild shortness of 

breath with exertion.








Objective





- Vital Signs


Vital signs: 


                                   Vital Signs











Temp  97.9 F   11/05/19 09:00


 


Pulse  80   11/05/19 09:00


 


Resp  16   11/05/19 09:00


 


BP  114/62   11/05/19 09:00


 


Pulse Ox  95   11/05/19 09:00








                                 Intake & Output











 11/04/19 11/05/19 11/05/19





 18:59 06:59 18:59


 


Intake Total 1120 400 480


 


Output Total 950 350 


 


Balance 170 50 480


 


Weight 77.111 kg 78.6 kg 


 


Intake:   


 


  Oral 1120 400 480


 


Output:   


 


  Urine 950 350 


 


Other:   


 


  Voiding Method Toilet Toilet Toilet


 


  # Voids 1  














- Exam


PHYSICAL EXAMINATION: 





GENERAL: 79-year-old  female in no acute distress at the time of my 

examination





HEENT: Head is atraumatic, normocephalic.  Pupils equal, round.  Sclera anict

bhavna. Conjunctiva are clear.  Mucous membranes of the mouth are moist.  Neck is 

supple.  There is no elevated jugular venous pressure.  No carotid  bruit is 

heard.





HEART EXAMINATION: Heart S1 and S2 irregularly irregular





CHEST EXAMINATION: Lungs are clear to auscultation and precussion. No chest wall

tenderness is noted on palpation or with deep breathing.





ABDOMEN:  Soft, nontender. Bowel sounds are heard. No organomegaly noted.


 


EXTREMITIES: 2+ peripheral pulses with no evidence of peripheral edema and no 

calf tenderness noted.





NEUROLOGIC patient is awake, alert and oriented X3.


 


.


 











- Labs


CBC & Chem 7: 


                                 11/05/19 05:54





                                 11/05/19 05:54


Labs: 


                  Abnormal Lab Results - Last 24 Hours (Table)











  11/04/19 11/04/19 11/04/19 Range/Units





  11:45 16:47 21:07 


 


RBC     (3.80-5.40)  m/uL


 


MCV     (80.0-100.0)  fL


 


MCH     (25.0-35.0)  pg


 


MCHC     (31.0-37.0)  g/dL


 


PT     (9.0-12.0)  sec


 


INR     (<1.2)  


 


BUN     (7-17)  mg/dL


 


Creatinine     (0.52-1.04)  mg/dL


 


Glucose     (74-99)  mg/dL


 


POC Glucose (mg/dL)  72 L  214 H  287 H  (75-99)  mg/dL


 


Magnesium     (1.6-2.3)  mg/dL














  11/05/19 11/05/19 11/05/19 Range/Units





  05:54 05:54 05:54 


 


RBC   5.67 H   (3.80-5.40)  m/uL


 


MCV   67.6 L   (80.0-100.0)  fL


 


MCH   20.6 L   (25.0-35.0)  pg


 


MCHC   30.5 L   (31.0-37.0)  g/dL


 


PT  21.2 H    (9.0-12.0)  sec


 


INR  2.2 H    (<1.2)  


 


BUN    35 H  (7-17)  mg/dL


 


Creatinine    1.55 H  (0.52-1.04)  mg/dL


 


Glucose    165 H  (74-99)  mg/dL


 


POC Glucose (mg/dL)     (75-99)  mg/dL


 


Magnesium    1.5 L  (1.6-2.3)  mg/dL














  11/05/19 Range/Units





  06:58 


 


RBC   (3.80-5.40)  m/uL


 


MCV   (80.0-100.0)  fL


 


MCH   (25.0-35.0)  pg


 


MCHC   (31.0-37.0)  g/dL


 


PT   (9.0-12.0)  sec


 


INR   (<1.2)  


 


BUN   (7-17)  mg/dL


 


Creatinine   (0.52-1.04)  mg/dL


 


Glucose   (74-99)  mg/dL


 


POC Glucose (mg/dL)  158 H  (75-99)  mg/dL


 


Magnesium   (1.6-2.3)  mg/dL














Assessment and Plan


Plan: 


Assessment and plan


#1 brief episode of unresponsiveness.  We will check orthostatics on the 

patient.


#2 chronic persistent atrial fibrillation


#3 mild troponin abnormality, not consistent with acute coronary syndrome, 

likely secondary to atrial fibrillation with a rapid ventricular response


#4 chronic kidney disease, stage III


#5 diabetes


#6 hyperlipidemia





Plan


Patient had an echocardiogram with Doppler study performed which is yet pending.

 She underwent a Persantine Cardiolite stress test yesterday that did not reveal

any evidence of reversible ischemia.  We will check orthostatics today, continue

current medications including Coumadin for anticoagulation.





DNP note has been reviewed, I agree with a documented findings and plan of care.

 Patient was seen and examined.

## 2019-11-05 NOTE — P.DS
Providers


Date of admission: 


11/03/19 18:32





Expected date of discharge: 11/05/19


Attending physician: 


Donny Lindsey MD





Consults: 





                                        





11/03/19 19:57


Consult Physician Urgent 


   Consulting Provider: Maurice Barclay


   Consult Reason/Comments: syncope, afib w/ rvr, troponin elevation


   Do you want consulting provider notified?: Yes











Primary care physician: 


[facility] Course: 


SUBJECTIVE:- Patient is an 79-year-old female resident of [facility] 

assisted living with a PMH of A. fib (on Coumadin), hyperthyroidism on 

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 

heart failure, and type 2 DM who presented to the ED after an episode of fall 

and possible loss of consciousness.  The patient does not recall the episode and

states that over the past few days, she had been feeling more tired than usual 

and had been spending more time in bed daily.  Today, as per usual, she had gone

to the cafeteria to have some dinner, after which she was in bed and the next 

thing she remembers is that she was on the ground at the bedside, unable to get 

up.  She activated her emergency bracelet and was found on the ground by the 

staff. EMS was activated and upon arrival, they were assisting her into bed when

she was noted to have a brief episode of unrespsonsiveness, as per the 

documentation. The patient denies having suffered any loss of consciousness and 

states that she recalls being helped into bed and then being brought to the ED 

for evaluation. She denied having any trauma from the fall. Denied any 

additional symptoms prior to or immediately after the event. Denied noticing 

chest discomfort, SOB, palpitations. Denied nausea, vomiting, abdominal pain, 

fever, or chills. Denied urinary complaints, headache, or visual disturbances. 





She underwent an extensive evaluation in the ED w/ initial presenting vital 

signs wnl. EKG revealed A fib w/ RVR @ 114 bpm w/ biphasic t-waves in leads V4-

V6 and T-wave flattening in lead I. Head CT revealed chronic small vessel 

ischemia with no acute changes and CXR revealed a slightly enlarged heart w/ LLL

atelectasis unchanged from prior. Laboratory evaluation revealed a WBC of 125, 

INR 2.7, Troponin of 0.043, BNP of 1130, BUN 32, and Cr 1.64 (similar to 

baseline), w/ K 5.2. She is admitted to the medicine service for further 

management of A-fib w/ RVR w/ ?syncope. 





OBJECTIVE:- 


Patient vital signs stable currently she is afebrile.  Lungs semination showed 

decreased breath sound at the bases with few basal by bilateral crackles no 

rhonchi or wheezes heard.  Cardiovascular exam revealed irregularly irregular 

heart rate with rate controlled.  Abdomen is soft bowel sound positive.  I 

personally pulses positive bilaterally lower exudate pulses decrease this 

chronic trace to 1+ bilateral lower extremity edema noted.





ASSESSMENT:-


Syncope


A. Fib w/RVR


Troponemia-mild


Hyperkalemia-resolved


Leukocytosis


CKD-III


HFrEF 30-35%


Hyperthyroidism


DM-II





PLAN:-


Patient has a a stress test done that was negative and she had 2-D 

echocardiogram done which showed pacer leads in place and ejection fraction of 

30-35%.  Patient troponins were borderline elevated which was considered to be 

due to see daily by the cardiology team.  Patient's orthostatic vitals were 

checked and there was no orthostatic that pressure drop of pulse rise or 

symptoms present.  Cardiology team cleared patient for discharge home today.  

Patient did requested to have some physical therapy at her apartment as she 

reports some difficulty with ambulation (at times she don't use walker) and had 

few falls lately.  Home care services were consulted for PT/OT/RN visits.  

Patient was given follow-up with her primary care provider in one week and 

cardiologist in 2 weeks and she will be following with Coumadin clinic for 

PT/INR follow-up and management.  Patient was given all her home medication 

along with that new medication metoprolol succinate 50 mg every 24 hours was 

initiated and prescription was sent to her pharmacy. 





Assessment: 


SEE ABOVE.


Health Concerns: 


Patient was advised and counseled to always use walker for ambulation while in 

side the apartment or outside. Continue diet and fluid restrictions as before, 

you need to f/u with your PCP and cardiologist in 1-2 weeks. Home care services 

were initiated as per your request for home PT/OT and RN visits PRN. Patient 

need to f/u with ANTICOAGULATION clinic in 2 days for warfarin management and 

PT/INR monitoring.


Patient Condition at Discharge: Fair





Plan - Discharge Summary


Discharge Rx Participant: No


New Discharge Prescriptions: 


New


   Metoprolol Succinate (ER) [Toprol XL] 50 mg PO DAILY 30 Days #30 tab.er.24h





Continue


   Simvastatin [Zocor] 40 mg PO DAILY


   LORazepam [Ativan] 0.5 mg PO HS PRN


     PRN Reason: Anxiety


   Timolol [Betimol 0.5% Ophth Soln] 1 drop BOTH EYES DAILY


   Magnesium 200 mg PO DAILY


   Pioglitazone HCl [Actos] 15 mg PO DAILY


   Pantoprazole [Protonix] 40 mg PO DAILY


   Methimazole [Tapazole] 5 mg PO DAILY #30 tab


   Sodium Bicarbonate Tab 650 mg PO QID #120 tab


   Insulin NPH Hum/Reg Insulin Hm [NovoLIN 70-30 100 UNIT/ML VIAL] 10 unit SQ 

BID


   Diltiazem HCl [Diltiazem 24Hr ER] 240 mg PO DAILY


   Furosemide [Lasix] 40 mg PO DAILY


   Warfarin [Coumadin] 2.5 mg PO SUTUTHSA


   Warfarin Sodium [Coumadin] 3.75 mg PO MOWEFR


Discharge Medication List





Simvastatin [Zocor] 40 mg PO DAILY 05/30/14 [History]


LORazepam [Ativan] 0.5 mg PO HS PRN 02/21/17 [History]


Timolol [Betimol 0.5% Ophth Soln] 1 drop BOTH EYES DAILY 03/13/17 [History]


Magnesium 200 mg PO DAILY 06/17/19 [History]


Pantoprazole [Protonix] 40 mg PO DAILY 06/17/19 [History]


Pioglitazone HCl [Actos] 15 mg PO DAILY 06/17/19 [History]


Methimazole [Tapazole] 5 mg PO DAILY #30 tab 06/19/19 [Rx]


Sodium Bicarbonate Tab 650 mg PO QID #120 tab 07/16/19 [Rx]


Diltiazem HCl [Diltiazem 24Hr ER] 240 mg PO DAILY 09/22/19 [History]


Furosemide [Lasix] 40 mg PO DAILY 09/22/19 [History]


Insulin NPH Hum/Reg Insulin Hm [NovoLIN 70-30 100 UNIT/ML VIAL] 10 unit SQ BID 

09/22/19 [History]


Warfarin Sodium [Coumadin] 3.75 mg PO MOWEFR 11/03/19 [History]


Warfarin [Coumadin] 2.5 mg PO SUTUTHSA 11/03/19 [History]


Metoprolol Succinate (ER) [Toprol XL] 50 mg PO DAILY 30 Days #30 tab.er.24h 

11/05/19 [Rx]








Follow up Appointment(s)/Referral(s): 


[facility], [NON-STAFF] - As Needed


Mckay Martinez MD [Primary Care Provider] - 11/12/19 10:15 am (Tuesday)


Raman Gonzalez MD [STAFF PHYSICIAN] - 11/19/19 3:45 pm (Tuesday)


Patient Instructions/Handouts:  Warfarin (By mouth)


Activity/Diet/Wound Care/Special Instructions: 


Always use walker for ambulation while in side the apartment or outside. 

Continue diet and fluid restrictions as before, you need to f/u with your PCP 

and cardiologist in 1-2 weeks. Home care services were initiated as per your 

request for home PT/OT and RN visits PRN.


Discharge Disposition: HOME WITH HOME HEALTH SERVICES


Plan of Treatment: 


Patient need to f/u with ANTICOAGULATION clinic in 2 days for warfarin 

management and PT/INR monitoring.

## 2019-11-05 NOTE — ECHOF
Referral Reason:AFib Trop elevation



MEASUREMENTS

--------

HEIGHT: 157.5 cm

WEIGHT: 77.1 kg

BP: 146/88

RVIDd:   2.5 cm     (< 3.3)

IVSd:   1.2 cm     (0.6 - 1.1)

LVIDd:   3.8 cm     (3.9 - 5.3)

LVPWd:   1.4 cm     (0.6 - 1.1)

IVSs:   1.5 cm

LVIDs:   3.3 cm

LVPWs:   1.9 cm

LAESV Index (A-L):   31.85 ml/m

Ao Diam:   2.7 cm     (2.0 - 3.7)

AV Cusp:   1.0 cm     (1.5 - 2.6)

RAP:   5.00 mmHg

RVSP:   38.66 mmHg







FINDINGS

--------

Atrial fibrillation.

This was a technically adequate study.

The left ventricular size is normal.   There is mild concentric left ventricular hypertrophy.   Overa
ll left ventricular systolic function is moderate-severely impaired with, an EF between 30 - 35 %.   
Left ventricular fillimg pressure cannot be estimated due to Atrial fibrillation.

The right ventricle is normal in size.

LA is midly dilated 29-33ml/m2.

The right atrial size is normal.   Electronic pacemaker lead seen in the right atrial cavity.

Interatrial and interventricular septum intact.

The aortic valve is trileaflet and appears structurally normal.   There is no evidence of aortic regu
rgitation.   There is no evidence of aortic stenosis.

Mild mitral regurgitation is present.

Mild tricuspid regurgitation present.   There is mild pulmonary hypertension.   The right ventricular
 systolic pressure, as measured by Doppler, is 38.66mmHg.

There is no pulmonic regurgitation present.

The aortic root size is normal.

The inferior vena cava is mildly dilated.

There is no pericardial effusion.



CONCLUSIONS

--------

1. Atrial fibrillation.

2. This was a technically adequate study.

3. The left ventricular size is normal.

4. There is mild concentric left ventricular hypertrophy.

5. Overall left ventricular systolic function is moderate-severely impaired with, an EF between 30 - 
35 %.

6. Left ventricular fillimg pressure cannot be estimated due to Atrial fibrillation.

7. The right ventricle is normal in size.

8. LA is midly dilated 29-33ml/m2.

9. The right atrial size is normal.

10. Electronic pacemaker lead seen in the right atrial cavity.

11. Interatrial and interventricular septum intact.

12. The aortic valve is trileaflet and appears structurally normal.

13. There is no evidence of aortic regurgitation.

14. There is no evidence of aortic stenosis.

15. Mild mitral regurgitation is present.

16. Mild tricuspid regurgitation present.

17. There is mild pulmonary hypertension.

18. The right ventricular systolic pressure, as measured by Doppler, is 38.66mmHg.

19. There is no pulmonic regurgitation present.

20. The aortic root size is normal.

21. The inferior vena cava is mildly dilated.

22. There is no pericardial effusion.





SONOGRAPHER: Edwina Lombardo RDCS
